# Patient Record
Sex: FEMALE | Race: BLACK OR AFRICAN AMERICAN | NOT HISPANIC OR LATINO
[De-identification: names, ages, dates, MRNs, and addresses within clinical notes are randomized per-mention and may not be internally consistent; named-entity substitution may affect disease eponyms.]

---

## 2018-03-18 PROBLEM — Z00.00 ENCOUNTER FOR PREVENTIVE HEALTH EXAMINATION: Status: ACTIVE | Noted: 2018-03-18

## 2018-04-17 ENCOUNTER — APPOINTMENT (OUTPATIENT)
Dept: GASTROENTEROLOGY | Facility: CLINIC | Age: 68
End: 2018-04-17

## 2019-08-09 ENCOUNTER — INPATIENT (INPATIENT)
Facility: HOSPITAL | Age: 69
LOS: 6 days | Discharge: NURSING, REHAB OR LONG-TERM CARE | End: 2019-08-16
Attending: STUDENT IN AN ORGANIZED HEALTH CARE EDUCATION/TRAINING PROGRAM | Admitting: STUDENT IN AN ORGANIZED HEALTH CARE EDUCATION/TRAINING PROGRAM
Payer: MEDICARE

## 2019-08-09 VITALS
HEART RATE: 89 BPM | TEMPERATURE: 98 F | RESPIRATION RATE: 16 BRPM | SYSTOLIC BLOOD PRESSURE: 174 MMHG | OXYGEN SATURATION: 100 % | DIASTOLIC BLOOD PRESSURE: 93 MMHG

## 2019-08-09 LAB
ALBUMIN SERPL ELPH-MCNC: 4.1 G/DL — SIGNIFICANT CHANGE UP (ref 3.3–5)
ALP SERPL-CCNC: 222 U/L — HIGH (ref 40–120)
ALT FLD-CCNC: 15 U/L — SIGNIFICANT CHANGE UP (ref 4–33)
ANION GAP SERPL CALC-SCNC: 13 MMO/L — SIGNIFICANT CHANGE UP (ref 7–14)
APTT BLD: 28.5 SEC — SIGNIFICANT CHANGE UP (ref 27.5–36.3)
AST SERPL-CCNC: 14 U/L — SIGNIFICANT CHANGE UP (ref 4–32)
BASE EXCESS BLDV CALC-SCNC: 6.1 MMOL/L — SIGNIFICANT CHANGE UP
BASOPHILS # BLD AUTO: 0.02 K/UL — SIGNIFICANT CHANGE UP (ref 0–0.2)
BASOPHILS NFR BLD AUTO: 0.2 % — SIGNIFICANT CHANGE UP (ref 0–2)
BILIRUB SERPL-MCNC: 0.3 MG/DL — SIGNIFICANT CHANGE UP (ref 0.2–1.2)
BLOOD GAS VENOUS - CREATININE: 0.64 MG/DL — SIGNIFICANT CHANGE UP (ref 0.5–1.3)
BUN SERPL-MCNC: 17 MG/DL — SIGNIFICANT CHANGE UP (ref 7–23)
CALCIUM SERPL-MCNC: 9.8 MG/DL — SIGNIFICANT CHANGE UP (ref 8.4–10.5)
CHLORIDE BLDV-SCNC: 104 MMOL/L — SIGNIFICANT CHANGE UP (ref 96–108)
CHLORIDE SERPL-SCNC: 100 MMOL/L — SIGNIFICANT CHANGE UP (ref 98–107)
CO2 SERPL-SCNC: 29 MMOL/L — SIGNIFICANT CHANGE UP (ref 22–31)
CREAT SERPL-MCNC: 0.59 MG/DL — SIGNIFICANT CHANGE UP (ref 0.5–1.3)
EOSINOPHIL # BLD AUTO: 0.01 K/UL — SIGNIFICANT CHANGE UP (ref 0–0.5)
EOSINOPHIL NFR BLD AUTO: 0.1 % — SIGNIFICANT CHANGE UP (ref 0–6)
GAS PNL BLDV: 139 MMOL/L — SIGNIFICANT CHANGE UP (ref 136–146)
GLUCOSE BLDV-MCNC: 173 MG/DL — HIGH (ref 70–99)
GLUCOSE SERPL-MCNC: 163 MG/DL — HIGH (ref 70–99)
HCO3 BLDV-SCNC: 29 MMOL/L — HIGH (ref 20–27)
HCT VFR BLD CALC: 32.5 % — LOW (ref 34.5–45)
HCT VFR BLDV CALC: 27.8 % — LOW (ref 34.5–45)
HGB BLD-MCNC: 9.5 G/DL — LOW (ref 11.5–15.5)
HGB BLDV-MCNC: 9 G/DL — LOW (ref 11.5–15.5)
IMM GRANULOCYTES NFR BLD AUTO: 0.4 % — SIGNIFICANT CHANGE UP (ref 0–1.5)
INR BLD: 1.04 — SIGNIFICANT CHANGE UP (ref 0.88–1.17)
LACTATE BLDV-MCNC: 1.6 MMOL/L — SIGNIFICANT CHANGE UP (ref 0.5–2)
LIDOCAIN IGE QN: 28.6 U/L — SIGNIFICANT CHANGE UP (ref 7–60)
LYMPHOCYTES # BLD AUTO: 0.91 K/UL — LOW (ref 1–3.3)
LYMPHOCYTES # BLD AUTO: 8.7 % — LOW (ref 13–44)
MCHC RBC-ENTMCNC: 23.9 PG — LOW (ref 27–34)
MCHC RBC-ENTMCNC: 29.2 % — LOW (ref 32–36)
MCV RBC AUTO: 81.9 FL — SIGNIFICANT CHANGE UP (ref 80–100)
MONOCYTES # BLD AUTO: 0.56 K/UL — SIGNIFICANT CHANGE UP (ref 0–0.9)
MONOCYTES NFR BLD AUTO: 5.4 % — SIGNIFICANT CHANGE UP (ref 2–14)
NEUTROPHILS # BLD AUTO: 8.9 K/UL — HIGH (ref 1.8–7.4)
NEUTROPHILS NFR BLD AUTO: 85.2 % — HIGH (ref 43–77)
NRBC # FLD: 0 K/UL — SIGNIFICANT CHANGE UP (ref 0–0)
PCO2 BLDV: 51 MMHG — SIGNIFICANT CHANGE UP (ref 41–51)
PH BLDV: 7.4 PH — SIGNIFICANT CHANGE UP (ref 7.32–7.43)
PLATELET # BLD AUTO: 339 K/UL — SIGNIFICANT CHANGE UP (ref 150–400)
PMV BLD: 10.2 FL — SIGNIFICANT CHANGE UP (ref 7–13)
PO2 BLDV: 31 MMHG — LOW (ref 35–40)
POTASSIUM BLDV-SCNC: 3.6 MMOL/L — SIGNIFICANT CHANGE UP (ref 3.4–4.5)
POTASSIUM SERPL-MCNC: 3.8 MMOL/L — SIGNIFICANT CHANGE UP (ref 3.5–5.3)
POTASSIUM SERPL-SCNC: 3.8 MMOL/L — SIGNIFICANT CHANGE UP (ref 3.5–5.3)
PROT SERPL-MCNC: 8.7 G/DL — HIGH (ref 6–8.3)
PROTHROM AB SERPL-ACNC: 11.9 SEC — SIGNIFICANT CHANGE UP (ref 9.8–13.1)
RBC # BLD: 3.97 M/UL — SIGNIFICANT CHANGE UP (ref 3.8–5.2)
RBC # FLD: 15.3 % — HIGH (ref 10.3–14.5)
SAO2 % BLDV: 49.5 % — LOW (ref 60–85)
SODIUM SERPL-SCNC: 142 MMOL/L — SIGNIFICANT CHANGE UP (ref 135–145)
TROPONIN T, HIGH SENSITIVITY: 15 NG/L — SIGNIFICANT CHANGE UP (ref ?–14)
WBC # BLD: 10.44 K/UL — SIGNIFICANT CHANGE UP (ref 3.8–10.5)
WBC # FLD AUTO: 10.44 K/UL — SIGNIFICANT CHANGE UP (ref 3.8–10.5)

## 2019-08-09 PROCEDURE — 76705 ECHO EXAM OF ABDOMEN: CPT | Mod: 26

## 2019-08-09 RX ORDER — SODIUM CHLORIDE 9 MG/ML
1000 INJECTION INTRAMUSCULAR; INTRAVENOUS; SUBCUTANEOUS ONCE
Refills: 0 | Status: COMPLETED | OUTPATIENT
Start: 2019-08-09 | End: 2019-08-09

## 2019-08-09 RX ORDER — ONDANSETRON 8 MG/1
4 TABLET, FILM COATED ORAL ONCE
Refills: 0 | Status: COMPLETED | OUTPATIENT
Start: 2019-08-09 | End: 2019-08-09

## 2019-08-09 RX ORDER — FAMOTIDINE 10 MG/ML
20 INJECTION INTRAVENOUS ONCE
Refills: 0 | Status: COMPLETED | OUTPATIENT
Start: 2019-08-09 | End: 2019-08-09

## 2019-08-09 RX ORDER — PIPERACILLIN AND TAZOBACTAM 4; .5 G/20ML; G/20ML
3.38 INJECTION, POWDER, LYOPHILIZED, FOR SOLUTION INTRAVENOUS ONCE
Refills: 0 | Status: COMPLETED | OUTPATIENT
Start: 2019-08-09 | End: 2019-08-09

## 2019-08-09 RX ADMIN — FAMOTIDINE 20 MILLIGRAM(S): 10 INJECTION INTRAVENOUS at 22:08

## 2019-08-09 RX ADMIN — SODIUM CHLORIDE 1000 MILLILITER(S): 9 INJECTION INTRAMUSCULAR; INTRAVENOUS; SUBCUTANEOUS at 23:10

## 2019-08-09 RX ADMIN — SODIUM CHLORIDE 1000 MILLILITER(S): 9 INJECTION INTRAMUSCULAR; INTRAVENOUS; SUBCUTANEOUS at 22:10

## 2019-08-09 RX ADMIN — ONDANSETRON 4 MILLIGRAM(S): 8 TABLET, FILM COATED ORAL at 22:05

## 2019-08-09 NOTE — ED PROVIDER NOTE - CARE PLAN
Principal Discharge DX:	Acute cholecystitis Principal Discharge DX:	Acute cholecystitis  Secondary Diagnosis:	Ventral hernia with bowel obstruction

## 2019-08-09 NOTE — ED PROVIDER NOTE - ATTENDING CONTRIBUTION TO CARE
Dr. Mann: I have personally performed a face to face bedside history and physical examination of this patient. I have discussed the history, examination, review of systems, assessment and plan of management with the resident. I have reviewed the electronic medical record and amended it to reflect my history, review of systems, physical exam, assessment and plan.     Attending Exam - Dr. Mann: GEN: well appearing, NAD  HEENT: +PERRL, EOMI  RESP: CTAB, no signs of respiratory distress CV: s1s2 RRR ABD: soft/non distended, diffusely tender  MSK: no deformities / swelling, normal range of motion, spine grossly normal NEURO: alert, non focal exam SKIN: normal color / temperature / condition.

## 2019-08-09 NOTE — ED PROVIDER NOTE - PROGRESS NOTE DETAILS
Marnie PGY3: [facility] of [facility] surgery consulted will see pt Marnie PGY3: pt evaluated by surgery will admit to their service for acute leo [place] PGY3: ventral wall hernia with sbo, mild mesenteric edema with fluid in hernia sac no pneumatosis or wall thickening. mass on US was L adnexal mass 19 cm surgery paged awaiting callback

## 2019-08-09 NOTE — ED ADULT TRIAGE NOTE - CHIEF COMPLAINT QUOTE
Pt c/o generalized abd pain/nv x 2 days, constipation x this am, pt states had same symptoms 2 months ago due to SBO.

## 2019-08-09 NOTE — ED PROVIDER NOTE - PMH
CAD (coronary artery disease)    DM (diabetes mellitus)    HTN (hypertension)    SBO (small bowel obstruction)

## 2019-08-09 NOTE — ED ADULT NURSE NOTE - NSIMPLEMENTINTERV_GEN_ALL_ED
Implemented All Fall with Harm Risk Interventions:  [place] to call system. Call bell, personal items and telephone within reach. Instruct patient to call for assistance. Room bathroom lighting operational. Non-slip footwear when patient is off stretcher. Physically safe environment: no spills, clutter or unnecessary equipment. Stretcher in lowest position, wheels locked, appropriate side rails in place. Provide visual cue, wrist band, yellow gown, etc. Monitor gait and stability. Monitor for mental status changes and reorient to person, place, and time. Review medications for side effects contributing to fall risk. Reinforce activity limits and safety measures with patient and family. Provide visual clues: red socks.

## 2019-08-09 NOTE — ED PROVIDER NOTE - CLINICAL SUMMARY MEDICAL DECISION MAKING FREE TEXT BOX
69 yo F c PMH of bowel obstruction (tx medically, this year) no PSH p/w 1 day h/o NBNB n/v associated with diffuse abdominal pain. On exam, T100.7F, VS otherwise wnl, +epigastric ttp, no rebound/guarding. SIN of GB surgery consulted will see pt. ctap pending. zosyn/ivf/tylenol for tx. will reassess.

## 2019-08-09 NOTE — ED ADULT NURSE NOTE - OBJECTIVE STATEMENT
Pt is a 69 y/o female that presents to the ED for eval of abd pain, n/v. Pt states that she has a history of bowel obstruction in March 2019, without any surgical interventions. Pt c/o multiple episodes of vomiting today, single, formed hard stool earlier today with c/o minimal flatus today. C/o upper abd pain with tenderness noted across upper abd and epigastric area. Pt c/o chills, denies subjective temps. Pt with limited mobility secondary to arthritis in various areas of the body. Stage II pressure ulcers x2 to sacrum and right buttock. Pt states that she has been being treated at home since discharge from outside facility in March. Ongoing eval in progress, will continue to monitor pt.

## 2019-08-09 NOTE — ED PROVIDER NOTE - OBJECTIVE STATEMENT
67 yo F c PMH of bowel obstruction (tx medically, this year) no PSH p/w 1 day h/o NBNB n/v associated with diffuse abdominal pain. last BM today was "hard" non-bloody.       home meds: metformin

## 2019-08-09 NOTE — ED PROVIDER NOTE - NONTENDER LOCATION
left lower quadrant/umbilical/left upper quadrant/right lower quadrant/right upper quadrant/periumbilical

## 2019-08-10 DIAGNOSIS — K81.0 ACUTE CHOLECYSTITIS: ICD-10-CM

## 2019-08-10 LAB
ANION GAP SERPL CALC-SCNC: 11 MMO/L — SIGNIFICANT CHANGE UP (ref 7–14)
BUN SERPL-MCNC: 15 MG/DL — SIGNIFICANT CHANGE UP (ref 7–23)
CALCIUM SERPL-MCNC: 9.4 MG/DL — SIGNIFICANT CHANGE UP (ref 8.4–10.5)
CHLORIDE SERPL-SCNC: 103 MMOL/L — SIGNIFICANT CHANGE UP (ref 98–107)
CO2 SERPL-SCNC: 29 MMOL/L — SIGNIFICANT CHANGE UP (ref 22–31)
CREAT SERPL-MCNC: 0.61 MG/DL — SIGNIFICANT CHANGE UP (ref 0.5–1.3)
GLUCOSE BLDC GLUCOMTR-MCNC: 104 MG/DL — HIGH (ref 70–99)
GLUCOSE BLDC GLUCOMTR-MCNC: 105 MG/DL — HIGH (ref 70–99)
GLUCOSE BLDC GLUCOMTR-MCNC: 111 MG/DL — HIGH (ref 70–99)
GLUCOSE BLDC GLUCOMTR-MCNC: 127 MG/DL — HIGH (ref 70–99)
GLUCOSE SERPL-MCNC: 149 MG/DL — HIGH (ref 70–99)
HCT VFR BLD CALC: 29.2 % — LOW (ref 34.5–45)
HGB BLD-MCNC: 8.4 G/DL — LOW (ref 11.5–15.5)
MAGNESIUM SERPL-MCNC: 1.9 MG/DL — SIGNIFICANT CHANGE UP (ref 1.6–2.6)
MCHC RBC-ENTMCNC: 23.5 PG — LOW (ref 27–34)
MCHC RBC-ENTMCNC: 28.8 % — LOW (ref 32–36)
MCV RBC AUTO: 81.6 FL — SIGNIFICANT CHANGE UP (ref 80–100)
NRBC # FLD: 0 K/UL — SIGNIFICANT CHANGE UP (ref 0–0)
PHOSPHATE SERPL-MCNC: 3 MG/DL — SIGNIFICANT CHANGE UP (ref 2.5–4.5)
PLATELET # BLD AUTO: 304 K/UL — SIGNIFICANT CHANGE UP (ref 150–400)
PMV BLD: 10.4 FL — SIGNIFICANT CHANGE UP (ref 7–13)
POTASSIUM SERPL-MCNC: 3.8 MMOL/L — SIGNIFICANT CHANGE UP (ref 3.5–5.3)
POTASSIUM SERPL-SCNC: 3.8 MMOL/L — SIGNIFICANT CHANGE UP (ref 3.5–5.3)
RBC # BLD: 3.58 M/UL — LOW (ref 3.8–5.2)
RBC # FLD: 15.2 % — HIGH (ref 10.3–14.5)
SODIUM SERPL-SCNC: 143 MMOL/L — SIGNIFICANT CHANGE UP (ref 135–145)
SPECIMEN SOURCE: SIGNIFICANT CHANGE UP
SPECIMEN SOURCE: SIGNIFICANT CHANGE UP
WBC # BLD: 10.51 K/UL — HIGH (ref 3.8–10.5)
WBC # FLD AUTO: 10.51 K/UL — HIGH (ref 3.8–10.5)

## 2019-08-10 PROCEDURE — 99222 1ST HOSP IP/OBS MODERATE 55: CPT | Mod: GC,57

## 2019-08-10 PROCEDURE — 71045 X-RAY EXAM CHEST 1 VIEW: CPT | Mod: 26

## 2019-08-10 PROCEDURE — 74177 CT ABD & PELVIS W/CONTRAST: CPT | Mod: 26

## 2019-08-10 RX ORDER — DEXTROSE 50 % IN WATER 50 %
12.5 SYRINGE (ML) INTRAVENOUS ONCE
Refills: 0 | Status: DISCONTINUED | OUTPATIENT
Start: 2019-08-10 | End: 2019-08-16

## 2019-08-10 RX ORDER — FUROSEMIDE 40 MG
1 TABLET ORAL
Qty: 0 | Refills: 0 | DISCHARGE

## 2019-08-10 RX ORDER — GLUCAGON INJECTION, SOLUTION 0.5 MG/.1ML
1 INJECTION, SOLUTION SUBCUTANEOUS ONCE
Refills: 0 | Status: DISCONTINUED | OUTPATIENT
Start: 2019-08-10 | End: 2019-08-16

## 2019-08-10 RX ORDER — DEXTROSE 50 % IN WATER 50 %
15 SYRINGE (ML) INTRAVENOUS ONCE
Refills: 0 | Status: DISCONTINUED | OUTPATIENT
Start: 2019-08-10 | End: 2019-08-16

## 2019-08-10 RX ORDER — DEXTROSE 50 % IN WATER 50 %
25 SYRINGE (ML) INTRAVENOUS ONCE
Refills: 0 | Status: DISCONTINUED | OUTPATIENT
Start: 2019-08-10 | End: 2019-08-16

## 2019-08-10 RX ORDER — SODIUM CHLORIDE 9 MG/ML
1000 INJECTION, SOLUTION INTRAVENOUS
Refills: 0 | Status: DISCONTINUED | OUTPATIENT
Start: 2019-08-10 | End: 2019-08-12

## 2019-08-10 RX ORDER — ATORVASTATIN CALCIUM 80 MG/1
1 TABLET, FILM COATED ORAL
Qty: 0 | Refills: 0 | DISCHARGE

## 2019-08-10 RX ORDER — INSULIN LISPRO 100/ML
VIAL (ML) SUBCUTANEOUS EVERY 6 HOURS
Refills: 0 | Status: DISCONTINUED | OUTPATIENT
Start: 2019-08-10 | End: 2019-08-11

## 2019-08-10 RX ORDER — METFORMIN HYDROCHLORIDE 850 MG/1
1 TABLET ORAL
Qty: 0 | Refills: 0 | DISCHARGE

## 2019-08-10 RX ORDER — ACETAMINOPHEN 500 MG
1000 TABLET ORAL ONCE
Refills: 0 | Status: COMPLETED | OUTPATIENT
Start: 2019-08-10 | End: 2019-08-10

## 2019-08-10 RX ORDER — ENOXAPARIN SODIUM 100 MG/ML
40 INJECTION SUBCUTANEOUS EVERY 12 HOURS
Refills: 0 | Status: DISCONTINUED | OUTPATIENT
Start: 2019-08-10 | End: 2019-08-16

## 2019-08-10 RX ADMIN — PIPERACILLIN AND TAZOBACTAM 200 GRAM(S): 4; .5 INJECTION, POWDER, LYOPHILIZED, FOR SOLUTION INTRAVENOUS at 00:02

## 2019-08-10 RX ADMIN — Medication 400 MILLIGRAM(S): at 23:02

## 2019-08-10 RX ADMIN — Medication 1000 MILLIGRAM(S): at 23:33

## 2019-08-10 RX ADMIN — SODIUM CHLORIDE 100 MILLILITER(S): 9 INJECTION, SOLUTION INTRAVENOUS at 19:19

## 2019-08-10 RX ADMIN — Medication 1.25 MILLIGRAM(S): at 19:21

## 2019-08-10 RX ADMIN — ENOXAPARIN SODIUM 40 MILLIGRAM(S): 100 INJECTION SUBCUTANEOUS at 19:21

## 2019-08-10 NOTE — CONSULT NOTE ADULT - SUBJECTIVE AND OBJECTIVE BOX
R2 Gyn Consult Note    CAS CASSIDY  68y  Female [id number]    HPI: 69y/o G0 with PMH of morbid obesity BMI>50, DM, HTN, HLD, OA and recent admission to [facility] for obstruction 2/2 umbilical hernia presented to the [facility] ED yesterday evening with abdominal pain, nausea, and vomiting. Patient found to have incarcerated ventral hernia with partial SBO on imaging. Admitted to Gen Surg. Imaging showed incidental 19cm left adnexal mass, Gyn team consulted. Patient seen and evaluated bedside. She reports 2 days of abdominal pain with nausea and non-bloody emesis yesterday. Unable to tolerate PO. Last BM and flatus on Thursday. Denies fevers and chills at home but patients reports being febrile in ED. Denies chest pain, shortness of breath, palpitations. Patient currently denies pelvic pain, post-menopausal vaginal bleeding, abnormal vaginal discharge. Patient reports remote of hx of small fibroids that never cause heavy bleeding or pelvic pain. Went through menopause around age 48. Denies hx of cysts. Reports she was not told she had an ovarian cyst during her recent [facility] admission despite undergoing imaging. Denies history of abnormal pap smears, last one 3 years ago, or STDs. Patient is not currently sexually active.     Name of last GYN Physician: Dr. Crowe (?) at the hip center (?) on [facility]    OBHx: G0    HCM: last pap 3 years ago, last mammo 1.5 years ago, never had a colonoscopy    PMH: DM2, HTN, HLD, OA, umbilical hernia    PSH: denies    Home Medications:  glipiZIDE 5 mg oral tablet: orally 2 times a day (10 Aug 2019 10:03)  Lasix 20 mg oral tablet: 1 tab(s) orally once a day (10 Aug 2019 10:03)  Lipitor 20 mg oral tablet: 1 tab(s) orally once a day (10 Aug 2019 10:03)  losartan 100 mg oral tablet: 1 tab(s) orally once a day (10 Aug 2019 10:03)  metFORMIN 500 mg oral tablet: 1 tab(s) orally 2 times a day (10 Aug 2019 10:03)    Allergies: NKDA    FamHx: grandma with breast CA, sister with breast CA diagnosed in her 20s, patient unsure if sister ever had genetic testing, denies additional family with breast, uterine, ovarian, colon cancers.    Soc: lives at home with , limited mobility 2/2 morbid obesity, uses wheelchair with walker intermittently has home health aid    Vital Signs Last 24 Hrs  T(C): 36.7 (10 Aug 2019 06:38), Max: 38.3 (09 Aug 2019 21:41)  T(F): 98.1 (10 Aug 2019 06:38), Max: 100.9 (09 Aug 2019 21:41)  HR: 66 (10 Aug 2019 06:38) (66 - 94)  BP: 146/58 (10 Aug 2019 06:38) (118/48 - 174/93)  BP(mean): --  RR: 18 (10 Aug 2019 06:38) (16 - 19)  SpO2: 100% (10 Aug 2019 06:38) (96% - 100%)    Physical Exam:   General: sitting comfortably in bed, NAD, AAox3, morbidly obese   CV: RRR  Lungs: CTAB  Abd: difficult exam given morbid obesity, soft, minimally-tender centrall, non-distended.  Bowel sounds present.    : deferred as unable to perform with single provider given morbid obesity  Ext: non-tender b/l, no edema     LABS:                              9.5    10.44 )-----------( 339      ( 09 Aug 2019 21:55 )             32.5     08-09    142  |  100  |  17  ----------------------------<  163<H>  3.8   |  29  |  0.59    Ca    9.8      09 Aug 2019 21:55    TPro  8.7<H>  /  Alb  4.1  /  TBili  0.3  /  DBili  x   /  AST  14  /  ALT  15  /  AlkPhos  222<H>  08-09    I&O's Detail    PT/INR - ( 09 Aug 2019 21:55 )   PT: 11.9 SEC;   INR: 1.04          PTT - ( 09 Aug 2019 21:55 )  PTT:28.5 SEC      RADIOLOGY & ADDITIONAL STUDIES:    < from: CT Abdomen and Pelvis w/ IV Cont (08.10.19 @ 01:58) >  EXAM:  CT ABDOMEN AND PELVIS IC        PROCEDURE DATE:  Aug 10 2019         INTERPRETATION:  CLINICAL INFORMATION: Vomiting. History of small bowel   obstruction in the past.     COMPARISON: Ultrasound 08/09/2019 PROCEDURE:   CT of the Abdomen and Pelvis was performed with intravenous contrast.   Intravenous contrast: 90 ml Omnipaque 350. 10 ml discarded.  Oral contrast: None.  Sagittal and coronal reformats were performed.    FINDINGS:    LOWER CHEST: Bibasilar subsegmental atelectasis.    LIVER: Within normal limits.  BILE DUCTS: Normal caliber.  GALLBLADDER: Cholelithiasis.  SPLEEN: Within normal limits.  PANCREAS: Within normal limits.  ADRENALS: A 3.4 cm indeterminate left adrenal 1.6 cm indeterminate right   adrenal nodules.  KIDNEYS/URETERS: Right interpolar subcentimeter hypodense focus, too   small to characterize. No hydronephrosis    BLADDER: Within normal limits.  REPRODUCTIVE ORGANS: Uterus  within normal limits. Prominent right adnexa   Large homogeneous fluid attenuation mass arising from the left adnexa   measuring 19.3 x 13.2 x 10.9 cm with calcifications.    BOWEL: Ventral hernia with dilated loops of small bowel with transition   point to the anterior abdomen suggesting low-grade/incomplete small bowel   obstruction. No bowel wall thickening or pneumatosis. Appendix is normal.  PERITONEUM: Mild mesenteric edema. Trace ascites.  VESSELS: Minimal atherosclerotic changes.  RETROPERITONEUM/LYMPH NODES: No lymphadenopathy.    ABDOMINAL WALL: Ventral hernia as above.  BONES: Degenerative changes.    IMPRESSION:     Small bowel obstruction with transition point to the anterior abdomen   within the ventral hernia.     No bowel wall thickening or pneumatosis.    A large left adnexal mass.    Bilateral indeterminateadrenal nodules.    < end of copied text > R2 Gyn Consult Note    CAS CASSIDY  68y  Female [id number]    HPI: 67y/o G0 with PMH of morbid obesity, DM, HTN, HLD, OA and recent admission to [facility] for obstruction 2/2 umbilical hernia presented to the [facility] ED yesterday evening with abdominal pain, nausea, and vomiting. Patient found to have incarcerated ventral hernia with partial SBO on imaging. Admitted to Gen Surg. Imaging showed incidental 19cm left adnexal mass, Gyn team consulted. Patient seen and evaluated bedside. She reports 2 days of abdominal pain with nausea and non-bloody emesis yesterday. Unable to tolerate PO. Last BM and flatus on Thursday. Denies fevers and chills at home but patients reports being febrile in ED. Denies chest pain, shortness of breath, palpitations. Patient currently denies pelvic pain, post-menopausal vaginal bleeding, abnormal vaginal discharge. Patient reports remote of hx of small fibroids that never cause heavy bleeding or pelvic pain. Went through menopause around age 48. Denies hx of cysts. Reports she was not told she had an ovarian cyst during her recent [facility] admission despite undergoing imaging. Denies history of abnormal pap smears, last one 3 years ago, or STDs. Patient is not currently sexually active.     Name of last GYN Physician: Dr. Crowe (?) at the hip center (?) on [facility]    OBHx: G0    HCM: last pap 3 years ago, last mammo 1.5 years ago, never had a colonoscopy    PMH: DM2, HTN, HLD, OA, umbilical hernia    PSH: denies    Home Medications:  glipiZIDE 5 mg oral tablet: orally 2 times a day (10 Aug 2019 10:03)  Lasix 20 mg oral tablet: 1 tab(s) orally once a day (10 Aug 2019 10:03)  Lipitor 20 mg oral tablet: 1 tab(s) orally once a day (10 Aug 2019 10:03)  losartan 100 mg oral tablet: 1 tab(s) orally once a day (10 Aug 2019 10:03)  metFORMIN 500 mg oral tablet: 1 tab(s) orally 2 times a day (10 Aug 2019 10:03)    Allergies: NKDA    FamHx: grandma with breast CA, sister with breast CA diagnosed in her 20s, patient unsure if sister ever had genetic testing, denies additional family with breast, uterine, ovarian, colon cancers.    Soc: lives at home with , limited mobility 2/2 morbid obesity, uses wheelchair with walker intermittently has home health aid    Vital Signs Last 24 Hrs  T(C): 36.7 (10 Aug 2019 06:38), Max: 38.3 (09 Aug 2019 21:41)  T(F): 98.1 (10 Aug 2019 06:38), Max: 100.9 (09 Aug 2019 21:41)  HR: 66 (10 Aug 2019 06:38) (66 - 94)  BP: 146/58 (10 Aug 2019 06:38) (118/48 - 174/93)  BP(mean): --  RR: 18 (10 Aug 2019 06:38) (16 - 19)  SpO2: 100% (10 Aug 2019 06:38) (96% - 100%)    Physical Exam:   General: sitting comfortably in bed, NAD, AAox3, morbidly obese   CV: RRR  Lungs: CTAB  Abd: difficult exam given morbid obesity, soft, minimally-tender centrall, non-distended.  Bowel sounds present.    : deferred as unable to perform with single provider given morbid obesity  Ext: non-tender b/l, no edema     LABS:                              9.5    10.44 )-----------( 339      ( 09 Aug 2019 21:55 )             32.5     08-09    142  |  100  |  17  ----------------------------<  163<H>  3.8   |  29  |  0.59    Ca    9.8      09 Aug 2019 21:55    TPro  8.7<H>  /  Alb  4.1  /  TBili  0.3  /  DBili  x   /  AST  14  /  ALT  15  /  AlkPhos  222<H>  08-09    I&O's Detail    PT/INR - ( 09 Aug 2019 21:55 )   PT: 11.9 SEC;   INR: 1.04          PTT - ( 09 Aug 2019 21:55 )  PTT:28.5 SEC      RADIOLOGY & ADDITIONAL STUDIES:    < from: CT Abdomen and Pelvis w/ IV Cont (08.10.19 @ 01:58) >  EXAM:  CT ABDOMEN AND PELVIS IC        PROCEDURE DATE:  Aug 10 2019         INTERPRETATION:  CLINICAL INFORMATION: Vomiting. History of small bowel   obstruction in the past.     COMPARISON: Ultrasound 08/09/2019 PROCEDURE:   CT of the Abdomen and Pelvis was performed with intravenous contrast.   Intravenous contrast: 90 ml Omnipaque 350. 10 ml discarded.  Oral contrast: None.  Sagittal and coronal reformats were performed.    FINDINGS:    LOWER CHEST: Bibasilar subsegmental atelectasis.    LIVER: Within normal limits.  BILE DUCTS: Normal caliber.  GALLBLADDER: Cholelithiasis.  SPLEEN: Within normal limits.  PANCREAS: Within normal limits.  ADRENALS: A 3.4 cm indeterminate left adrenal 1.6 cm indeterminate right   adrenal nodules.  KIDNEYS/URETERS: Right interpolar subcentimeter hypodense focus, too   small to characterize. No hydronephrosis    BLADDER: Within normal limits.  REPRODUCTIVE ORGANS: Uterus  within normal limits. Prominent right adnexa   Large homogeneous fluid attenuation mass arising from the left adnexa   measuring 19.3 x 13.2 x 10.9 cm with calcifications.    BOWEL: Ventral hernia with dilated loops of small bowel with transition   point to the anterior abdomen suggesting low-grade/incomplete small bowel   obstruction. No bowel wall thickening or pneumatosis. Appendix is normal.  PERITONEUM: Mild mesenteric edema. Trace ascites.  VESSELS: Minimal atherosclerotic changes.  RETROPERITONEUM/LYMPH NODES: No lymphadenopathy.    ABDOMINAL WALL: Ventral hernia as above.  BONES: Degenerative changes.    IMPRESSION:     Small bowel obstruction with transition point to the anterior abdomen   within the ventral hernia.     No bowel wall thickening or pneumatosis.    A large left adnexal mass.    Bilateral indeterminateadrenal nodules.    < end of copied text > R2 Gyn Consult Note    CAS CASSIDY  68y  Female [id number]    HPI: 69y/o G0 with PMH of morbid obesity, DM, HTN, HLD, OA and recent admission to [facility] for obstruction 2/2 umbilical hernia presented to the [facility] ED yesterday evening with abdominal pain, nausea, and vomiting. Patient found to have incarcerated ventral hernia with partial SBO on imaging. Admitted to Gen Surg. Imaging showed incidental 19cm left adnexal mass, Gyn team consulted. Patient seen and evaluated bedside. She reports 2 days of abdominal pain with nausea and non-bloody emesis yesterday. Unable to tolerate PO. Last BM and flatus on Thursday. Denies fevers and chills at home but patients reports being febrile in ED. Denies chest pain, shortness of breath, palpitations. Patient currently denies pelvic pain, post-menopausal vaginal bleeding, abnormal vaginal discharge. Patient reports remote of hx of small fibroids that never cause heavy bleeding or pelvic pain. Went through menopause around age 48. Denies hx of cysts. Reports she was not told she had an ovarian cyst during her recent [facility] admission despite undergoing imaging. Denies history of abnormal pap smears, last one 3 years ago, or STDs. Patient is not currently sexually active.     Name of last GYN Physician: Dr. Crowe (?) at the hip center (?) on [facility]    OBHx: G0    HCM: last pap 3 years ago, last mammo 1.5 years ago, never had a colonoscopy    PMH: DM2, HTN, HLD, OA, umbilical hernia    PSH: denies    Home Medications:  glipiZIDE 5 mg oral tablet: orally 2 times a day (10 Aug 2019 10:03)  Lasix 20 mg oral tablet: 1 tab(s) orally once a day (10 Aug 2019 10:03)  Lipitor 20 mg oral tablet: 1 tab(s) orally once a day (10 Aug 2019 10:03)  losartan 100 mg oral tablet: 1 tab(s) orally once a day (10 Aug 2019 10:03)  metFORMIN 500 mg oral tablet: 1 tab(s) orally 2 times a day (10 Aug 2019 10:03)    Allergies: NKDA    FamHx: grandma with breast CA, sister with breast CA diagnosed in her 20s, patient unsure if sister ever had genetic testing, denies additional family with breast, uterine, ovarian, colon cancers.    Soc: lives at home with , limited mobility 2/2 morbid obesity, uses wheelchair with walker intermittently has home health aid    Vital Signs Last 24 Hrs  T(C): 36.7 (10 Aug 2019 06:38), Max: 38.3 (09 Aug 2019 21:41)  T(F): 98.1 (10 Aug 2019 06:38), Max: 100.9 (09 Aug 2019 21:41)  HR: 66 (10 Aug 2019 06:38) (66 - 94)  BP: 146/58 (10 Aug 2019 06:38) (118/48 - 174/93)  BP(mean): --  RR: 18 (10 Aug 2019 06:38) (16 - 19)  SpO2: 100% (10 Aug 2019 06:38) (96% - 100%)    Physical Exam:   General: sitting comfortably in bed, NAD, AAox3, morbidly obese   CV: RRR  Lungs: CTAB  Abd: difficult exam given morbid obesity, soft, minimally-tender centrall, non-distended.  Bowel sounds present.    : limited pelvic exam given morbid obesity and narrow introitus: cervix small and closed, no masses palpated in vaginal vault  Ext: non-tender b/l, no edema     LABS:                              9.5    10.44 )-----------( 339      ( 09 Aug 2019 21:55 )             32.5     08-09    142  |  100  |  17  ----------------------------<  163<H>  3.8   |  29  |  0.59    Ca    9.8      09 Aug 2019 21:55    TPro  8.7<H>  /  Alb  4.1  /  TBili  0.3  /  DBili  x   /  AST  14  /  ALT  15  /  AlkPhos  222<H>  08-09    I&O's Detail    PT/INR - ( 09 Aug 2019 21:55 )   PT: 11.9 SEC;   INR: 1.04          PTT - ( 09 Aug 2019 21:55 )  PTT:28.5 SEC      RADIOLOGY & ADDITIONAL STUDIES:    < from: CT Abdomen and Pelvis w/ IV Cont (08.10.19 @ 01:58) >  EXAM:  CT ABDOMEN AND PELVIS IC        PROCEDURE DATE:  Aug 10 2019         INTERPRETATION:  CLINICAL INFORMATION: Vomiting. History of small bowel   obstruction in the past.     COMPARISON: Ultrasound 08/09/2019 PROCEDURE:   CT of the Abdomen and Pelvis was performed with intravenous contrast.   Intravenous contrast: 90 ml Omnipaque 350. 10 ml discarded.  Oral contrast: None.  Sagittal and coronal reformats were performed.    FINDINGS:    LOWER CHEST: Bibasilar subsegmental atelectasis.    LIVER: Within normal limits.  BILE DUCTS: Normal caliber.  GALLBLADDER: Cholelithiasis.  SPLEEN: Within normal limits.  PANCREAS: Within normal limits.  ADRENALS: A 3.4 cm indeterminate left adrenal 1.6 cm indeterminate right   adrenal nodules.  KIDNEYS/URETERS: Right interpolar subcentimeter hypodense focus, too   small to characterize. No hydronephrosis    BLADDER: Within normal limits.  REPRODUCTIVE ORGANS: Uterus  within normal limits. Prominent right adnexa   Large homogeneous fluid attenuation mass arising from the left adnexa   measuring 19.3 x 13.2 x 10.9 cm with calcifications.    BOWEL: Ventral hernia with dilated loops of small bowel with transition   point to the anterior abdomen suggesting low-grade/incomplete small bowel   obstruction. No bowel wall thickening or pneumatosis. Appendix is normal.  PERITONEUM: Mild mesenteric edema. Trace ascites.  VESSELS: Minimal atherosclerotic changes.  RETROPERITONEUM/LYMPH NODES: No lymphadenopathy.    ABDOMINAL WALL: Ventral hernia as above.  BONES: Degenerative changes.    IMPRESSION:     Small bowel obstruction with transition point to the anterior abdomen   within the ventral hernia.     No bowel wall thickening or pneumatosis.    A large left adnexal mass.    Bilateral indeterminateadrenal nodules.    < end of copied text >

## 2019-08-10 NOTE — H&P ADULT - NSICDXPASTMEDICALHX_GEN_ALL_CORE_FT
PAST MEDICAL HISTORY:  CAD (coronary artery disease)     DM (diabetes mellitus)     HTN (hypertension)     SBO (small bowel obstruction)

## 2019-08-10 NOTE — H&P ADULT - ASSESSMENT
68 year old female presenting with epigastric abdominal pain. Patient on CT scan demonstrated a ventral hernia with small bowel and fluid in hernia sac and a large ovarian cyst 10cm with calcification. Patient does not appear clinically to be obstructed despite the radiographic evidence. Patient is an extremely poor operative candidate given morbid obesity, this unknown adnexal mass with calcification, and overall state of health. Discussed in depth with patient that this hernia likely requires surgery however would request input from Gyn service regarding ovarian mass as it may change operative planning. We also planned to get records from [facility] where she reportedly had a stress test performed.    K43.6 Ventral hernia with bowel obstruction E13.9 DM (diabetes mellitus), secondary   I10 Essential hypertension   E66.01 Morbid (severe) obesity due to excess calories  plan  gyn consult  possible OR for VHR  serial abdominal exams  NPO, IVF  obtain records from [facility]  d/w  Dr. Duff

## 2019-08-10 NOTE — CONSULT NOTE ADULT - PROBLEM SELECTOR RECOMMENDATION 9
- recommend obtaining tumor markers (, CA 19-9, CEA, LDH, BHCG)  - obtain outside medical records    DRE Howe PGY2  d/w Dr. Singh

## 2019-08-10 NOTE — H&P ADULT - HISTORY OF PRESENT ILLNESS
68 year old female with PMH of morbidly obese BMI 50, DM, HTN, recent admission to [facility] for obstruction 2/2 to umbilical hernia presents with 2 days of abdominal pain in the epigastrum associated initially with vomiting and nausea. Patient reports last BM and flatus was yesterday. No fevers or chills. On exam patient appears comfortable however SOB, and abdominal exam tenderness at the umbilicus, with no skin changes or pain elsewhere. Labs showed left shift with Lizeth pct 80%, alk phos 222 otherwise normal. CT scan showed a transition point at the hernia site, with collapsed loops of bowel, and air throughout the colon indicating a partial SBO.

## 2019-08-10 NOTE — H&P ADULT - NSHPLABSRESULTS_GEN_ALL_CORE
reviewed CBC (08-09 @ 21:55)                          9.5<L>                   10.44   )--------------(  339        85.2<H>% Neuts, 8.7<L>% Lymphs, ANC: 8.90<H>                          32.5<L>    BMP (08-09 @ 21:55)       142     |  100     |  17    			Ca++ --      Ca 9.8          ---------------------------------( 163<H>		Mg --           3.8     |  29      |  0.59  			Ph --        LFTs (08-09 @ 21:55)      TPro 8.7<H> / Alb 4.1 / TBili 0.3 / DBili -- / AST 14 / ALT 15 / AlkPhos 222<H>    Coags (08-09 @ 21:55)  aPTT 28.5 / INR 1.04 / PT 11.9    VBG (08-09 @ 21:55)     7.40 / 51 / 31<L> / 29<H> / 6.1 / 49.5<L>%      Lactate: 1.6      EXAM:  CT ABDOMEN AND PELVIS IC    ******PRELIMINARY REPORT******        INTERPRETATION:  Ventralabdominal hernia with an associated small bowel   obstruction. There is a small amount of fluid within the hernia sac as   well as mild mesenteric edema. There is no bowel wall thickening or   pneumatosis. Nonspecific 19.3 x 13.2 x 10.9 cm left adnexal homogeneous   mass containing a calcification. Recommend MRI with and without IV   contrast for further characterization.

## 2019-08-10 NOTE — CONSULT NOTE ADULT - ASSESSMENT
69y/o G0 with PMH of morbid obesity BMI>50, DM, HTN, HLD, OA admitted presented with abdominal pain, nausea, vomiting, admitted to Gen Surg with partial SBO from ventral hernia. Gyn team consulted for incidentally found adnexal mass.  - recommend obtaining tumor markers (, CA 19-9, CEA, LDH, BHCG)  - obtain outside medical records    DRE Howe PGY2  TBS by attending 67y/o G0 with PMH of morbid obesity, DM, HTN, HLD, OA admitted presented with abdominal pain, nausea, vomiting, admitted to Gen Surg with partial SBO from ventral hernia. Gyn team consulted for incidentally found adnexal mass.  - recommend obtaining tumor markers (, CA 19-9, CEA, LDH, BHCG)  - obtain outside medical records    DRE Howe PGY2  TBS by attending 69y/o G0 with PMH of morbid obesity, DM, HTN, HLD, OA admitted presented with abdominal pain, nausea, vomiting, admitted to Gen Surg with partial SBO from ventral hernia. Gyn team consulted for incidentally found adnexal mass.

## 2019-08-10 NOTE — H&P ADULT - NSHPPHYSICALEXAM_GEN_ALL_CORE
as in HPI Vital Signs Last 24 Hrs  T(C): 36.7 (10 Aug 2019 06:38), Max: 38.3 (09 Aug 2019 21:41)  T(F): 98.1 (10 Aug 2019 06:38), Max: 100.9 (09 Aug 2019 21:41)  HR: 66 (10 Aug 2019 06:38) (66 - 94)  BP: 146/58 (10 Aug 2019 06:38) (118/48 - 174/93)  BP(mean): --  RR: 18 (10 Aug 2019 06:38) (16 - 19)  SpO2: 100% (10 Aug 2019 06:38) (96% - 100%)    PHYSICAL EXAM:  Constitutional: resting in bed with no acute distress  Respiratory:  unlabored breathing on room air   Gastrointestinal: large body habitus limiting exam, abdomen soft, non distended, tender in the umbilicus without skin changes   Extremities:  No edema, no calf tenderness

## 2019-08-11 DIAGNOSIS — N94.9 UNSPECIFIED CONDITION ASSOCIATED WITH FEMALE GENITAL ORGANS AND MENSTRUAL CYCLE: ICD-10-CM

## 2019-08-11 LAB
ANION GAP SERPL CALC-SCNC: 12 MMO/L — SIGNIFICANT CHANGE UP (ref 7–14)
APPEARANCE UR: SIGNIFICANT CHANGE UP
BACTERIA # UR AUTO: SIGNIFICANT CHANGE UP
BILIRUB UR-MCNC: NEGATIVE — SIGNIFICANT CHANGE UP
BLOOD UR QL VISUAL: SIGNIFICANT CHANGE UP
BUN SERPL-MCNC: 14 MG/DL — SIGNIFICANT CHANGE UP (ref 7–23)
CALCIUM SERPL-MCNC: 9.3 MG/DL — SIGNIFICANT CHANGE UP (ref 8.4–10.5)
CANCER AG125 SERPL-ACNC: 107 U/ML — HIGH
CANCER AG19-9 SERPL-ACNC: < 2 U/ML — SIGNIFICANT CHANGE UP
CEA SERPL-MCNC: 1.6 NG/ML — SIGNIFICANT CHANGE UP (ref 1–3.8)
CHLORIDE SERPL-SCNC: 105 MMOL/L — SIGNIFICANT CHANGE UP (ref 98–107)
CO2 SERPL-SCNC: 28 MMOL/L — SIGNIFICANT CHANGE UP (ref 22–31)
COLOR SPEC: YELLOW — SIGNIFICANT CHANGE UP
CREAT SERPL-MCNC: 0.54 MG/DL — SIGNIFICANT CHANGE UP (ref 0.5–1.3)
EPI CELLS # UR: SIGNIFICANT CHANGE UP
GLUCOSE BLDC GLUCOMTR-MCNC: 79 MG/DL — SIGNIFICANT CHANGE UP (ref 70–99)
GLUCOSE BLDC GLUCOMTR-MCNC: 82 MG/DL — SIGNIFICANT CHANGE UP (ref 70–99)
GLUCOSE BLDC GLUCOMTR-MCNC: 89 MG/DL — SIGNIFICANT CHANGE UP (ref 70–99)
GLUCOSE BLDC GLUCOMTR-MCNC: 89 MG/DL — SIGNIFICANT CHANGE UP (ref 70–99)
GLUCOSE BLDC GLUCOMTR-MCNC: 96 MG/DL — SIGNIFICANT CHANGE UP (ref 70–99)
GLUCOSE SERPL-MCNC: 83 MG/DL — SIGNIFICANT CHANGE UP (ref 70–99)
GLUCOSE UR-MCNC: NEGATIVE — SIGNIFICANT CHANGE UP
HBA1C BLD-MCNC: 5.2 % — SIGNIFICANT CHANGE UP (ref 4–5.6)
HCG SERPL-ACNC: < 5 MIU/ML — SIGNIFICANT CHANGE UP
HCV AB S/CO SERPL IA: 0.09 S/CO — SIGNIFICANT CHANGE UP (ref 0–0.99)
HCV AB SERPL-IMP: SIGNIFICANT CHANGE UP
KETONES UR-MCNC: NEGATIVE — SIGNIFICANT CHANGE UP
LDH SERPL L TO P-CCNC: 141 U/L — SIGNIFICANT CHANGE UP (ref 135–225)
LEUKOCYTE ESTERASE UR-ACNC: NEGATIVE — SIGNIFICANT CHANGE UP
MAGNESIUM SERPL-MCNC: 2 MG/DL — SIGNIFICANT CHANGE UP (ref 1.6–2.6)
NITRITE UR-MCNC: NEGATIVE — SIGNIFICANT CHANGE UP
PH UR: 6 — SIGNIFICANT CHANGE UP (ref 5–8)
PHOSPHATE SERPL-MCNC: 3 MG/DL — SIGNIFICANT CHANGE UP (ref 2.5–4.5)
POTASSIUM SERPL-MCNC: 3.8 MMOL/L — SIGNIFICANT CHANGE UP (ref 3.5–5.3)
POTASSIUM SERPL-SCNC: 3.8 MMOL/L — SIGNIFICANT CHANGE UP (ref 3.5–5.3)
PROT UR-MCNC: >300 — SIGNIFICANT CHANGE UP
RBC CASTS # UR COMP ASSIST: SIGNIFICANT CHANGE UP (ref 0–?)
SODIUM SERPL-SCNC: 145 MMOL/L — SIGNIFICANT CHANGE UP (ref 135–145)
SP GR SPEC: > 1.04 — HIGH (ref 1–1.04)
UROBILINOGEN FLD QL: NORMAL — SIGNIFICANT CHANGE UP
WBC UR QL: SIGNIFICANT CHANGE UP (ref 0–?)

## 2019-08-11 PROCEDURE — 99232 SBSQ HOSP IP/OBS MODERATE 35: CPT | Mod: GC

## 2019-08-11 RX ORDER — INSULIN LISPRO 100/ML
VIAL (ML) SUBCUTANEOUS
Refills: 0 | Status: DISCONTINUED | OUTPATIENT
Start: 2019-08-11 | End: 2019-08-16

## 2019-08-11 RX ORDER — ACETAMINOPHEN 500 MG
1000 TABLET ORAL ONCE
Refills: 0 | Status: COMPLETED | OUTPATIENT
Start: 2019-08-11 | End: 2019-08-11

## 2019-08-11 RX ORDER — NYSTATIN CREAM 100000 [USP'U]/G
1 CREAM TOPICAL
Refills: 0 | Status: COMPLETED | OUTPATIENT
Start: 2019-08-11 | End: 2019-08-14

## 2019-08-11 RX ORDER — POTASSIUM CHLORIDE 20 MEQ
20 PACKET (EA) ORAL ONCE
Refills: 0 | Status: COMPLETED | OUTPATIENT
Start: 2019-08-11 | End: 2019-08-11

## 2019-08-11 RX ADMIN — SODIUM CHLORIDE 100 MILLILITER(S): 9 INJECTION, SOLUTION INTRAVENOUS at 22:20

## 2019-08-11 RX ADMIN — NYSTATIN CREAM 1 APPLICATION(S): 100000 CREAM TOPICAL at 22:20

## 2019-08-11 RX ADMIN — SODIUM CHLORIDE 100 MILLILITER(S): 9 INJECTION, SOLUTION INTRAVENOUS at 06:40

## 2019-08-11 RX ADMIN — ENOXAPARIN SODIUM 40 MILLIGRAM(S): 100 INJECTION SUBCUTANEOUS at 18:36

## 2019-08-11 RX ADMIN — Medication 1.25 MILLIGRAM(S): at 18:36

## 2019-08-11 RX ADMIN — SODIUM CHLORIDE 100 MILLILITER(S): 9 INJECTION, SOLUTION INTRAVENOUS at 18:36

## 2019-08-11 RX ADMIN — Medication 1000 MILLIGRAM(S): at 08:36

## 2019-08-11 RX ADMIN — Medication 400 MILLIGRAM(S): at 08:06

## 2019-08-11 RX ADMIN — Medication 1.25 MILLIGRAM(S): at 06:37

## 2019-08-11 RX ADMIN — ENOXAPARIN SODIUM 40 MILLIGRAM(S): 100 INJECTION SUBCUTANEOUS at 06:38

## 2019-08-11 RX ADMIN — Medication 20 MILLIEQUIVALENT(S): at 11:45

## 2019-08-11 NOTE — PROGRESS NOTE ADULT - SUBJECTIVE AND OBJECTIVE BOX
GENERAL SURGERY DAILY PROGRESS NOTE:    Interval:  No acute events overnight.    Subjective:  Patient seen and examined. Reports pain is well controlled. Denies N/V. Tolerating diet. Passing flatus, +BM. Ambulating.    Vital Signs Last 24 Hrs  T(C): 36.3 (11 Aug 2019 06:18), Max: 36.8 (10 Aug 2019 17:03)  T(F): 97.3 (11 Aug 2019 06:18), Max: 98.3 (10 Aug 2019 22:26)  HR: 63 (11 Aug 2019 06:18) (62 - 75)  BP: 136/62 (11 Aug 2019 06:18) (106/56 - 136/62)  BP(mean): --  RR: 18 (11 Aug 2019 06:18) (16 - 18)  SpO2: 97% (11 Aug 2019 06:18) (97% - 100%)    Exam:  Gen: NAD, resting in bed, alert and responding appropriately  Resp: Airway patent, non-labored respirations  Abd: Soft, ND, NT, no rebound or guarding. Incisions c/d/i  Ext: No edema, WWP  Neuro: AAOx3, no focal deficits    I&O's Detail      Daily     Daily     MEDICATIONS  (STANDING):  dextrose 5%. 1000 milliLiter(s) (50 mL/Hr) IV Continuous <Continuous>  dextrose 50% Injectable 12.5 Gram(s) IV Push once  dextrose 50% Injectable 25 Gram(s) IV Push once  dextrose 50% Injectable 25 Gram(s) IV Push once  enalaprilat Injectable 1.25 milliGRAM(s) IV Push every 6 hours  enoxaparin Injectable 40 milliGRAM(s) SubCutaneous every 12 hours  insulin lispro (HumaLOG) corrective regimen sliding scale   SubCutaneous every 6 hours  lactated ringers. 1000 milliLiter(s) (100 mL/Hr) IV Continuous <Continuous>    MEDICATIONS  (PRN):  dextrose 40% Gel 15 Gram(s) Oral once PRN Blood Glucose LESS THAN 70 milliGRAM(s)/deciliter  glucagon  Injectable 1 milliGRAM(s) IntraMuscular once PRN Glucose LESS THAN 70 milligrams/deciliter      LABS:                        8.4    10.51 )-----------( 304      ( 10 Aug 2019 09:15 )             29.2     08-10    143  |  103  |  15  ----------------------------<  149<H>  3.8   |  29  |  0.61    Ca    9.4      10 Aug 2019 09:15  Phos  3.0     08-10  Mg     1.9     08-10    TPro  8.7<H>  /  Alb  4.1  /  TBili  0.3  /  DBili  x   /  AST  14  /  ALT  15  /  AlkPhos  222<H>  08-09    PT/INR - ( 09 Aug 2019 21:55 )   PT: 11.9 SEC;   INR: 1.04          PTT - ( 09 Aug 2019 21:55 )  PTT:28.5 SEC      Dispo:     WARD Juarez, PGY-2  Acute Care Surgery (B Team)  [id number] with any questions

## 2019-08-11 NOTE — CHART NOTE - NSCHARTNOTEFT_GEN_A_CORE
R2 GYN Chart Note     mildly elevated, additional tumor markers wnl. Cyst homogenous appearing with small calcifications more consistent with benign disease. Spoke with primary team, patient will likely be discharged and scheduled for hernia repair outpatient given need for medical clearance and optimization prior to OR. Will plan for concurrent cystectomy with GYN. Please have patient follow up outpatient in Dr. Singh's office as soon as possible after discharge    DRE Howe PGY2  GYN pager #[phone]

## 2019-08-11 NOTE — PROVIDER CONTACT NOTE (OTHER) - ASSESSMENT
Patient AxOx4, asymptomatic, asleep between care, denies chest pain, SOB, n/v/dizziness, no acute distress noted at this time

## 2019-08-11 NOTE — PROVIDER CONTACT NOTE (OTHER) - ASSESSMENT
patient AxOx4, denies chest pain, SOB, n/v/dizziness. pt complains of abdominal pain upon movement when changed.

## 2019-08-11 NOTE — PROGRESS NOTE ADULT - ATTENDING COMMENTS
I saw and examined the patient and agree with the above note.    Mrs. Leblanc has a complicated PMHx and was recently worked up for eventual hernia repair (stress test) at an OSH. Due to her morbid obesity and concurrent medical problems along with the incidental mass found on CT, we will not rush her to the OR for a ventral hernia repair. She is clinically doing well and appears to not have strangulated bowel. GYN recs appreciated. Will coordinate repair next week with input from our hernia surgeons as well as onc.    Clears, pain control  Obtain outside records     I spent 25min reviewing history, data, images and in discussion/coordination of care. Greater than 50% of my time was spent in face-to-face discussion with the patient regarding the care plan going forward.     Nikko Velasquez MD (Cell: [phone])  Acute and Critical Care Surgery    The Acute Care Surgery (B Team) Attending Group Practice:  Dr. Chelsea Delgadillo, Dr. Maycol Willams, Dr. Pennie Acevedo, Dr. Nikko Velasquez    Urgent issues - spectra [phone] or [phone]  Nonurgent issues - [phone]  Patient appointments or after hours - [phone]

## 2019-08-11 NOTE — PROVIDER CONTACT NOTE (OTHER) - ASSESSMENT
patient asleep between care, asymptomatic, no s/s of acute distress noted. denies chest pain, SOB, n/v/dizziness. patient due for enalaprilat IVP, asked provider if can be given, provider Anjelica Velarde states to hold until AM, continue to monitor

## 2019-08-11 NOTE — PROGRESS NOTE ADULT - ASSESSMENT
Assessment:  68F presenting with epigastric abdominal pain, with CT showing ventral hernia with small bowel and fluid in hernia sac and a large ovarian cyst 10cm with calcification.     Plan:  - f/u GYN  - Obtain medical records from [facility]  - NPO, IVF  - serial abdominal exam

## 2019-08-12 ENCOUNTER — TRANSCRIPTION ENCOUNTER (OUTPATIENT)
Age: 69
End: 2019-08-12

## 2019-08-12 LAB
ANION GAP SERPL CALC-SCNC: 8 MMO/L — SIGNIFICANT CHANGE UP (ref 7–14)
BUN SERPL-MCNC: 13 MG/DL — SIGNIFICANT CHANGE UP (ref 7–23)
CALCIUM SERPL-MCNC: 8.8 MG/DL — SIGNIFICANT CHANGE UP (ref 8.4–10.5)
CHLORIDE SERPL-SCNC: 104 MMOL/L — SIGNIFICANT CHANGE UP (ref 98–107)
CO2 SERPL-SCNC: 29 MMOL/L — SIGNIFICANT CHANGE UP (ref 22–31)
CREAT SERPL-MCNC: 0.6 MG/DL — SIGNIFICANT CHANGE UP (ref 0.5–1.3)
GLUCOSE BLDC GLUCOMTR-MCNC: 102 MG/DL — HIGH (ref 70–99)
GLUCOSE BLDC GLUCOMTR-MCNC: 121 MG/DL — HIGH (ref 70–99)
GLUCOSE BLDC GLUCOMTR-MCNC: 95 MG/DL — SIGNIFICANT CHANGE UP (ref 70–99)
GLUCOSE SERPL-MCNC: 82 MG/DL — SIGNIFICANT CHANGE UP (ref 70–99)
HCT VFR BLD CALC: 27.9 % — LOW (ref 34.5–45)
HGB BLD-MCNC: 8 G/DL — LOW (ref 11.5–15.5)
MAGNESIUM SERPL-MCNC: 1.8 MG/DL — SIGNIFICANT CHANGE UP (ref 1.6–2.6)
MCHC RBC-ENTMCNC: 23.8 PG — LOW (ref 27–34)
MCHC RBC-ENTMCNC: 28.7 % — LOW (ref 32–36)
MCV RBC AUTO: 83 FL — SIGNIFICANT CHANGE UP (ref 80–100)
NRBC # FLD: 0.02 K/UL — SIGNIFICANT CHANGE UP (ref 0–0)
PHOSPHATE SERPL-MCNC: 2.8 MG/DL — SIGNIFICANT CHANGE UP (ref 2.5–4.5)
PLATELET # BLD AUTO: 250 K/UL — SIGNIFICANT CHANGE UP (ref 150–400)
PMV BLD: 9.9 FL — SIGNIFICANT CHANGE UP (ref 7–13)
POTASSIUM SERPL-MCNC: 4.3 MMOL/L — SIGNIFICANT CHANGE UP (ref 3.5–5.3)
POTASSIUM SERPL-SCNC: 4.3 MMOL/L — SIGNIFICANT CHANGE UP (ref 3.5–5.3)
RBC # BLD: 3.36 M/UL — LOW (ref 3.8–5.2)
RBC # FLD: 15 % — HIGH (ref 10.3–14.5)
SODIUM SERPL-SCNC: 141 MMOL/L — SIGNIFICANT CHANGE UP (ref 135–145)
WBC # BLD: 7.64 K/UL — SIGNIFICANT CHANGE UP (ref 3.8–10.5)
WBC # FLD AUTO: 7.64 K/UL — SIGNIFICANT CHANGE UP (ref 3.8–10.5)

## 2019-08-12 RX ORDER — ACETAMINOPHEN 500 MG
975 TABLET ORAL ONCE
Refills: 0 | Status: COMPLETED | OUTPATIENT
Start: 2019-08-12 | End: 2019-08-12

## 2019-08-12 RX ORDER — FUROSEMIDE 40 MG
20 TABLET ORAL DAILY
Refills: 0 | Status: DISCONTINUED | OUTPATIENT
Start: 2019-08-12 | End: 2019-08-16

## 2019-08-12 RX ORDER — LOSARTAN POTASSIUM 100 MG/1
100 TABLET, FILM COATED ORAL DAILY
Refills: 0 | Status: DISCONTINUED | OUTPATIENT
Start: 2019-08-12 | End: 2019-08-16

## 2019-08-12 RX ORDER — ATORVASTATIN CALCIUM 80 MG/1
20 TABLET, FILM COATED ORAL AT BEDTIME
Refills: 0 | Status: DISCONTINUED | OUTPATIENT
Start: 2019-08-12 | End: 2019-08-16

## 2019-08-12 RX ADMIN — ATORVASTATIN CALCIUM 20 MILLIGRAM(S): 80 TABLET, FILM COATED ORAL at 21:10

## 2019-08-12 RX ADMIN — NYSTATIN CREAM 1 APPLICATION(S): 100000 CREAM TOPICAL at 06:15

## 2019-08-12 RX ADMIN — Medication 975 MILLIGRAM(S): at 22:00

## 2019-08-12 RX ADMIN — LOSARTAN POTASSIUM 100 MILLIGRAM(S): 100 TABLET, FILM COATED ORAL at 10:56

## 2019-08-12 RX ADMIN — ENOXAPARIN SODIUM 40 MILLIGRAM(S): 100 INJECTION SUBCUTANEOUS at 18:34

## 2019-08-12 RX ADMIN — ENOXAPARIN SODIUM 40 MILLIGRAM(S): 100 INJECTION SUBCUTANEOUS at 06:15

## 2019-08-12 RX ADMIN — Medication 975 MILLIGRAM(S): at 21:10

## 2019-08-12 RX ADMIN — Medication 1.25 MILLIGRAM(S): at 06:15

## 2019-08-12 RX ADMIN — NYSTATIN CREAM 1 APPLICATION(S): 100000 CREAM TOPICAL at 18:34

## 2019-08-12 RX ADMIN — Medication 1.25 MILLIGRAM(S): at 00:10

## 2019-08-12 RX ADMIN — Medication 20 MILLIGRAM(S): at 10:12

## 2019-08-12 NOTE — PHYSICAL THERAPY INITIAL EVALUATION ADULT - PERTINENT HX OF CURRENT PROBLEM, REHAB EVAL
Pt. admitted for incarcerated ventral hernia. PMH of morbid obesity, DM, HTN, recent admission to [facility] for obstruction secondary to umbilical hernia

## 2019-08-12 NOTE — PROGRESS NOTE ADULT - ATTENDING COMMENTS
Ms. Leblanc is a morbidly obese deconditioned female who presents with an chronically incarcerated ventral hernia. She has regained GI function after non-surgical treatment of her small bowel obstruction. From a surgical standpoint she is cleared for discharge. Will follow-up as an outpatient for elective repair.

## 2019-08-12 NOTE — PHYSICAL THERAPY INITIAL EVALUATION ADULT - GENERAL OBSERVATIONS, REHAB EVAL
Consult received, chart reviewed. Patient received supine in bed, NAD, +NC. Patient agreed to Evaluation from Physical Therapist.

## 2019-08-12 NOTE — PHYSICAL THERAPY INITIAL EVALUATION ADULT - ADDITIONAL COMMENTS
Pt. reports owning DME of rollator. Pt. reports she has had difficulty with functional mobility recently. Pt. ambulates short distances within the home.     Pt. was left supine in bed post PT Evaluation, NAD, all lines intact, call bell within reach.

## 2019-08-12 NOTE — DISCHARGE NOTE PROVIDER - CARE PROVIDERS DIRECT ADDRESSES
[email or web address]

## 2019-08-12 NOTE — DISCHARGE NOTE PROVIDER - CARE PROVIDER_API CALL
Mallorie Singh (DO)  Obstetrics and Gynecology  [address] [place] [address]  Phone: [phone]  Fax: [phone]  Follow Up Time:     Stephanie Hopson)  Surgery  [address] [place] [address]  Phone: [phone]  Fax: [phone]  Follow Up Time:

## 2019-08-12 NOTE — PROGRESS NOTE ADULT - SUBJECTIVE AND OBJECTIVE BOX
GENERAL SURGERY DAILY PROGRESS NOTE:    Interval:  No acute events overnight.    Subjective:  Patient seen and examined this AM. Reports pain remains well controlled. Denies N/V, SOB, CP. Tolerating diet. Passing flatus, +BM. Ambulating.    Vital Signs Last 24 Hrs  T(C): 36.3 (11 Aug 2019 06:18), Max: 36.8 (10 Aug 2019 17:03)  T(F): 97.3 (11 Aug 2019 06:18), Max: 98.3 (10 Aug 2019 22:26)  HR: 63 (11 Aug 2019 06:18) (62 - 75)  BP: 136/62 (11 Aug 2019 06:18) (106/56 - 136/62)  BP(mean): --  RR: 18 (11 Aug 2019 06:18) (16 - 18)  SpO2: 97% (11 Aug 2019 06:18) (97% - 100%)    Exam:  Gen: NAD, resting in bed, alert and responding appropriately  Resp: Airway patent, non-labored respirations  Abd: Soft, NDNT, +BS, no rebound or guarding. Incisions c/d/i  Ext: No edema, WWP  Neuro: AAOx3, no focal deficits    I&O's Detail      Daily     Daily     MEDICATIONS  (STANDING):  dextrose 5%. 1000 milliLiter(s) (50 mL/Hr) IV Continuous <Continuous>  dextrose 50% Injectable 12.5 Gram(s) IV Push once  dextrose 50% Injectable 25 Gram(s) IV Push once  dextrose 50% Injectable 25 Gram(s) IV Push once  enalaprilat Injectable 1.25 milliGRAM(s) IV Push every 6 hours  enoxaparin Injectable 40 milliGRAM(s) SubCutaneous every 12 hours  insulin lispro (HumaLOG) corrective regimen sliding scale   SubCutaneous every 6 hours  lactated ringers. 1000 milliLiter(s) (100 mL/Hr) IV Continuous <Continuous>    MEDICATIONS  (PRN):  dextrose 40% Gel 15 Gram(s) Oral once PRN Blood Glucose LESS THAN 70 milliGRAM(s)/deciliter  glucagon  Injectable 1 milliGRAM(s) IntraMuscular once PRN Glucose LESS THAN 70 milligrams/deciliter      LABS:                        8.4    10.51 )-----------( 304      ( 10 Aug 2019 09:15 )             29.2     08-10    143  |  103  |  15  ----------------------------<  149<H>  3.8   |  29  |  0.61    Ca    9.4      10 Aug 2019 09:15  Phos  3.0     08-10  Mg     1.9     08-10    TPro  8.7<H>  /  Alb  4.1  /  TBili  0.3  /  DBili  x   /  AST  14  /  ALT  15  /  AlkPhos  222<H>  08-09    PT/INR - ( 09 Aug 2019 21:55 )   PT: 11.9 SEC;   INR: 1.04          PTT - ( 09 Aug 2019 21:55 )  PTT:28.5 SEC

## 2019-08-12 NOTE — DISCHARGE NOTE PROVIDER - NSDCCPCAREPLAN_GEN_ALL_CORE_FT
PRINCIPAL DISCHARGE DIAGNOSIS  Diagnosis: Ventral hernia  Assessment and Plan of Treatment: ACTIVITY: No heavy lifting or straining. Otherwise, you may return to your usual level of physical activity.  DIET: Return to your usual diet.  NOTIFY YOUR SURGEON IF: You have any fever (over 100.4 F) or chills, persistent nausea/vomiting, persistent diarrhea, or if your pain is not controlled on your discharge pain medications.  FOLLOW-UP: Please follow up with your primary care physician in one week regarding your hospitalization.   You will need to have this repaired surgically.   Please call the surgeon's office number provided in 1- 2 weeks to beging planning for this surgery.      SECONDARY DISCHARGE DIAGNOSES  Diagnosis: Adnexal mass  Assessment and Plan of Treatment: Follow-up with gynecologist, Dr. Mallorie Singh- call number provided within 1-2 weeks for managment regarding this mass seen on CT scan.

## 2019-08-12 NOTE — DISCHARGE NOTE PROVIDER - HOSPITAL COURSE
68 year old female with PMH of morbidly obese BMI 50, DM, HTN, recent admission to [facility] for obstruction 2/2 to umbilical hernia presents to [facility] 8/10/19 with 2 days of abdominal pain in the epigastrum associated initially with vomiting and nausea. Patient reports last BM and flatus was yesterday. No fevers or chills. On exam patient appears comfortable however SOB, and abdominal exam tenderness at the umbilicus, with no skin changes or pain elsewhere. Labs showed left shift with Lizeth pct 80%, alk phos 222 otherwise normal.         Admission CT (8/10): Small bowel obstruction with transition point to the anterior abdomen     within the ventral hernia.  No bowel wall thickening or pneumatosis. A large left adnexal mass.    Bilateral indeterminate adrenal nodules.        Patient was admitted to the surgical service. Due to her morbid obesity and concurrent medical problems along with the incidental mass found on CT, it was decided not to emergently take patient the OR for a ventral hernia repair. She is clinically doing well and appears to not have strangulated bowel.         Gynecology was consulted given CT findings.  was found to be mildly elevated, additional tumor markers wnl. Cyst homogenous appearing with small calcifications more consistent with benign disease. Patient was recommended to follow up outpatient in Dr. Singh's office as soon as possible after discharge for planned concurrent cystectomy.         Patient was assessed by physical therapy and deemed stable for discharge______________        Pt currently voiding, tolerating a regular diet, without pain. Patient is stable for discharge home to follow up as an outpatient, instructed to call to schedule appointment.

## 2019-08-12 NOTE — PHYSICAL THERAPY INITIAL EVALUATION ADULT - CRITERIA FOR SKILLED THERAPEUTIC INTERVENTIONS
rehab potential/predicted duration of therapy intervention/risk reduction/prevention/impairments found/anticipated discharge recommendation/therapy frequency

## 2019-08-12 NOTE — PROGRESS NOTE ADULT - ASSESSMENT
Assessment:  Pt is a 68F presenting with epigastric abdominal pain, with CT showing ventral hernia with small bowel and fluid in hernia sac and a large ovarian cyst 10cm with calcification.     Plan:  - f/u GYN recs  - Obtain medical records from [facility]  - Diet: regular  - Fluids: HLIV  - Transition to PO medications and restart home meds  - Continue to monitor GI function    B Team Surgery  [id number]

## 2019-08-13 LAB
GLUCOSE BLDC GLUCOMTR-MCNC: 114 MG/DL — HIGH (ref 70–99)
GLUCOSE BLDC GLUCOMTR-MCNC: 132 MG/DL — HIGH (ref 70–99)
GLUCOSE BLDC GLUCOMTR-MCNC: 153 MG/DL — HIGH (ref 70–99)
GLUCOSE BLDC GLUCOMTR-MCNC: 155 MG/DL — HIGH (ref 70–99)

## 2019-08-13 RX ORDER — POLYETHYLENE GLYCOL 3350 17 G/17G
17 POWDER, FOR SOLUTION ORAL DAILY
Refills: 0 | Status: DISCONTINUED | OUTPATIENT
Start: 2019-08-13 | End: 2019-08-16

## 2019-08-13 RX ORDER — ACETAMINOPHEN 500 MG
975 TABLET ORAL EVERY 6 HOURS
Refills: 0 | Status: DISCONTINUED | OUTPATIENT
Start: 2019-08-13 | End: 2019-08-16

## 2019-08-13 RX ADMIN — Medication 20 MILLIGRAM(S): at 06:01

## 2019-08-13 RX ADMIN — ATORVASTATIN CALCIUM 20 MILLIGRAM(S): 80 TABLET, FILM COATED ORAL at 23:29

## 2019-08-13 RX ADMIN — NYSTATIN CREAM 1 APPLICATION(S): 100000 CREAM TOPICAL at 06:01

## 2019-08-13 RX ADMIN — Medication 975 MILLIGRAM(S): at 19:28

## 2019-08-13 RX ADMIN — Medication 975 MILLIGRAM(S): at 09:54

## 2019-08-13 RX ADMIN — Medication 975 MILLIGRAM(S): at 09:28

## 2019-08-13 RX ADMIN — LOSARTAN POTASSIUM 100 MILLIGRAM(S): 100 TABLET, FILM COATED ORAL at 06:01

## 2019-08-13 RX ADMIN — Medication 1: at 13:11

## 2019-08-13 RX ADMIN — Medication 975 MILLIGRAM(S): at 19:52

## 2019-08-13 RX ADMIN — ENOXAPARIN SODIUM 40 MILLIGRAM(S): 100 INJECTION SUBCUTANEOUS at 06:01

## 2019-08-13 RX ADMIN — NYSTATIN CREAM 1 APPLICATION(S): 100000 CREAM TOPICAL at 19:19

## 2019-08-13 NOTE — PROGRESS NOTE ADULT - ASSESSMENT
Assessment:  Pt is a 68F presenting with epigastric abdominal pain, with CT showing ventral hernia with small bowel and fluid in hernia sac and a large ovarian cyst 10cm with calcification. Doing well with return of bowel function, ready for d/c to rehab facility per PT recs.    Plan:  - GYN aware, will coordinate outpatient to schedule surgery  - Obtain medical records from [facility]  - Diet: regular  - Transition to PO medications and restart home meds  - Continue to monitor GI function  - Arthiritic pain in ankles/back, on standing Tylenol PRN.  - Seen by PT who recommended rehab facility, case management aware and patient amenable    B Team Surgery  [id number]

## 2019-08-13 NOTE — PROGRESS NOTE ADULT - SUBJECTIVE AND OBJECTIVE BOX
Interval Events:    No acute events overnight    S: Patient doing well, denies fevers, chills, nausea, emesis, chest pain, SOB.  Pain is well controlled. Tolerating PO w/o N/V.  +/+ F/BM.    O: Vital Signs Last 24 Hrs  T(C): 36.9 (12 Aug 2019 21:08), Max: 36.9 (12 Aug 2019 17:41)  T(F): 98.5 (12 Aug 2019 21:08), Max: 98.5 (12 Aug 2019 21:08)  HR: 82 (12 Aug 2019 21:08) (65 - 82)  BP: 169/68 (12 Aug 2019 21:08) (133/46 - 169/68)  BP(mean): --  RR: 16 (12 Aug 2019 21:08) (16 - 18)  SpO2: 97% (12 Aug 2019 21:08) (97% - 100%)      11 Aug 2019 07:01  -  12 Aug 2019 07:00  --------------------------------------------------------  IN:    lactated ringers.: 300 mL    Oral Fluid: 400 mL  Total IN: 700 mL    OUT:    Voided: 600 mL  Total OUT: 600 mL    Total NET: 100 mL      12 Aug 2019 07:01  -  13 Aug 2019 00:07  --------------------------------------------------------  IN:    Oral Fluid: 400 mL  Total IN: 400 mL    OUT:    Voided: 1500 mL  Total OUT: 1500 mL    Total NET: -1100 mL          Physical Exam:    Gen: Well-developed, well-nourished in no acute distress  Resp: Clear to auscultation bilaterally with no wheezes, rale, or rhonchi  CV: Regular rate and rhythm with no murmur, gallop, or rub  GI: Soft, non-tender, non-distended with normoactive bowel sounds.  No masses.  MSK: Moves all extremities equally  Skin: No rashes    Labs:                        8.0    7.64  )-----------( 250      ( 12 Aug 2019 06:20 )             27.9     12 Aug 2019 06:20    141    |  104    |  13     ----------------------------<  82     4.3     |  29     |  0.60     Ca    8.8        12 Aug 2019 06:20  Phos  2.8       12 Aug 2019 06:20  Mg     1.8       12 Aug 2019 06:20        CAPILLARY BLOOD GLUCOSE      POCT Blood Glucose.: 121 mg/dL (12 Aug 2019 17:56)  POCT Blood Glucose.: 102 mg/dL (12 Aug 2019 12:42)  POCT Blood Glucose.: 95 mg/dL (12 Aug 2019 08:45)            Urinalysis Basic - ( 11 Aug 2019 08:00 )    Color: YELLOW / Appearance: TURBID / SG: > 1.040 / pH: 6.0  Gluc: NEGATIVE / Ketone: NEGATIVE  / Bili: NEGATIVE / Urobili: NORMAL   Blood: MODERATE / Protein: >300 / Nitrite: NEGATIVE   Leuk Esterase: NEGATIVE / RBC: 5-10 / WBC 0-2   Sq Epi: x / Non Sq Epi: FEW / Bacteria: SMALL        MEDICATIONS  (STANDING):  atorvastatin 20 milliGRAM(s) Oral at bedtime  dextrose 50% Injectable 12.5 Gram(s) IV Push once  dextrose 50% Injectable 25 Gram(s) IV Push once  dextrose 50% Injectable 25 Gram(s) IV Push once  enoxaparin Injectable 40 milliGRAM(s) SubCutaneous every 12 hours  furosemide    Tablet 20 milliGRAM(s) Oral daily  insulin lispro (HumaLOG) corrective regimen sliding scale   SubCutaneous three times a day before meals  losartan 100 milliGRAM(s) Oral daily  nystatin Powder 1 Application(s) Topical two times a day    MEDICATIONS  (PRN):  acetaminophen   Tablet .. 975 milliGRAM(s) Oral every 6 hours PRN Mild Pain (1 - 3), Moderate Pain (4 - 6), Severe Pain (7 - 10)  dextrose 40% Gel 15 Gram(s) Oral once PRN Blood Glucose LESS THAN 70 milliGRAM(s)/deciliter  glucagon  Injectable 1 milliGRAM(s) IntraMuscular once PRN Glucose LESS THAN 70 milligrams/deciliter

## 2019-08-14 LAB
BACTERIA BLD CULT: SIGNIFICANT CHANGE UP
BACTERIA BLD CULT: SIGNIFICANT CHANGE UP
GLUCOSE BLDC GLUCOMTR-MCNC: 135 MG/DL — HIGH (ref 70–99)
GLUCOSE BLDC GLUCOMTR-MCNC: 137 MG/DL — HIGH (ref 70–99)
GLUCOSE BLDC GLUCOMTR-MCNC: 141 MG/DL — HIGH (ref 70–99)
GLUCOSE BLDC GLUCOMTR-MCNC: 157 MG/DL — HIGH (ref 70–99)

## 2019-08-14 RX ADMIN — NYSTATIN CREAM 1 APPLICATION(S): 100000 CREAM TOPICAL at 06:14

## 2019-08-14 RX ADMIN — ENOXAPARIN SODIUM 40 MILLIGRAM(S): 100 INJECTION SUBCUTANEOUS at 06:14

## 2019-08-14 RX ADMIN — Medication 975 MILLIGRAM(S): at 15:30

## 2019-08-14 RX ADMIN — Medication 1: at 18:09

## 2019-08-14 RX ADMIN — ATORVASTATIN CALCIUM 20 MILLIGRAM(S): 80 TABLET, FILM COATED ORAL at 23:28

## 2019-08-14 RX ADMIN — ENOXAPARIN SODIUM 40 MILLIGRAM(S): 100 INJECTION SUBCUTANEOUS at 18:10

## 2019-08-14 RX ADMIN — Medication 975 MILLIGRAM(S): at 06:13

## 2019-08-14 RX ADMIN — LOSARTAN POTASSIUM 100 MILLIGRAM(S): 100 TABLET, FILM COATED ORAL at 06:14

## 2019-08-14 RX ADMIN — Medication 975 MILLIGRAM(S): at 07:13

## 2019-08-14 RX ADMIN — Medication 20 MILLIGRAM(S): at 06:13

## 2019-08-14 RX ADMIN — Medication 975 MILLIGRAM(S): at 16:00

## 2019-08-14 NOTE — PROGRESS NOTE ADULT - ASSESSMENT
Assessment:  Pt is a 68F presenting with epigastric abdominal pain, with CT showing ventral hernia with small bowel and fluid in hernia sac and a large ovarian cyst 10cm with calcification. Doing well with return of bowel function, ready for d/c to rehab facility per PT recs.    Plan:  - GYN aware, will coordinate outpatient to schedule surgery  - Diet: regular  - Transition to PO medications and restart home meds  - Continue to monitor GI function  - Arthiritic pain in ankles/back, on standing Tylenol PRN.  - Seen by PT who recommended rehab facility, case management aware and patient amenable    B Team Surgery  [id number]

## 2019-08-15 LAB
GLUCOSE BLDC GLUCOMTR-MCNC: 129 MG/DL — HIGH (ref 70–99)
GLUCOSE BLDC GLUCOMTR-MCNC: 131 MG/DL — HIGH (ref 70–99)
GLUCOSE BLDC GLUCOMTR-MCNC: 135 MG/DL — HIGH (ref 70–99)
GLUCOSE BLDC GLUCOMTR-MCNC: 137 MG/DL — HIGH (ref 70–99)

## 2019-08-15 RX ADMIN — ATORVASTATIN CALCIUM 20 MILLIGRAM(S): 80 TABLET, FILM COATED ORAL at 22:06

## 2019-08-15 RX ADMIN — LOSARTAN POTASSIUM 100 MILLIGRAM(S): 100 TABLET, FILM COATED ORAL at 05:57

## 2019-08-15 RX ADMIN — Medication 975 MILLIGRAM(S): at 13:08

## 2019-08-15 RX ADMIN — Medication 20 MILLIGRAM(S): at 05:57

## 2019-08-15 RX ADMIN — ENOXAPARIN SODIUM 40 MILLIGRAM(S): 100 INJECTION SUBCUTANEOUS at 05:57

## 2019-08-15 RX ADMIN — ENOXAPARIN SODIUM 40 MILLIGRAM(S): 100 INJECTION SUBCUTANEOUS at 18:51

## 2019-08-15 RX ADMIN — Medication 975 MILLIGRAM(S): at 14:00

## 2019-08-15 NOTE — PROGRESS NOTE ADULT - SUBJECTIVE AND OBJECTIVE BOX
Interval Events:    No acute events overnight    S: Patient doing well, denies fevers, chills, nausea, emesis, chest pain, SOB.  Pain is well controlled. Tolerating PO w/o N/V.  +F/+BM.    O: Vital Signs Last 24 Hrs  T(C): 36.4 (14 Aug 2019 05:57), Max: 37 (13 Aug 2019 14:26)  T(F): 97.5 (14 Aug 2019 05:57), Max: 98.6 (13 Aug 2019 14:26)  HR: 87 (14 Aug 2019 05:57) (69 - 87)  BP: 155/64 (14 Aug 2019 05:57) (138/54 - 155/64)  BP(mean): --  RR: 16 (14 Aug 2019 05:57) (16 - 18)  SpO2: 97% (14 Aug 2019 05:57) (97% - 99%)      13 Aug 2019 07:01  -  14 Aug 2019 07:00  --------------------------------------------------------  IN:    Oral Fluid: 400 mL  Total IN: 400 mL    OUT:    Voided: 1100 mL  Total OUT: 1100 mL    Total NET: -700 mL          Physical Exam:    Gen: Well-developed, well-nourished in no acute distress  Resp: Clear to auscultation bilaterally with no wheezes, rale, or rhonchi  CV: Regular rate and rhythm with no murmur, gallop, or rub  GI: Soft, non-tender, distended with normoactive bowel sounds.  No masses.  MSK: Moves all extremities equally  Skin: No rashes    Labs:    CAPILLARY BLOOD GLUCOSE      POCT Blood Glucose.: 155 mg/dL (13 Aug 2019 21:26)  POCT Blood Glucose.: 132 mg/dL (13 Aug 2019 17:57)  POCT Blood Glucose.: 153 mg/dL (13 Aug 2019 13:04)  POCT Blood Glucose.: 114 mg/dL (13 Aug 2019 08:52)    MEDICATIONS  (STANDING):  atorvastatin 20 milliGRAM(s) Oral at bedtime  dextrose 50% Injectable 12.5 Gram(s) IV Push once  dextrose 50% Injectable 25 Gram(s) IV Push once  dextrose 50% Injectable 25 Gram(s) IV Push once  enoxaparin Injectable 40 milliGRAM(s) SubCutaneous every 12 hours  furosemide    Tablet 20 milliGRAM(s) Oral daily  insulin lispro (HumaLOG) corrective regimen sliding scale   SubCutaneous three times a day before meals  losartan 100 milliGRAM(s) Oral daily    MEDICATIONS  (PRN):  acetaminophen   Tablet .. 975 milliGRAM(s) Oral every 6 hours PRN Mild Pain (1 - 3), Moderate Pain (4 - 6), Severe Pain (7 - 10)  dextrose 40% Gel 15 Gram(s) Oral once PRN Blood Glucose LESS THAN 70 milliGRAM(s)/deciliter  glucagon  Injectable 1 milliGRAM(s) IntraMuscular once PRN Glucose LESS THAN 70 milligrams/deciliter  polyethylene glycol 3350 17 Gram(s) Oral daily PRN Constipation

## 2019-08-15 NOTE — PROGRESS NOTE ADULT - ASSESSMENT
Assessment:  Pt is a 68F presenting with epigastric abdominal pain, with CT showing ventral hernia with small bowel and fluid in hernia sac and a large ovarian cyst 10cm with calcification. Doing well with return of bowel function, ready for d/c to rehab facility per PT recs.    Plan:  - GYN aware, will coordinate outpatient to schedule surgery  - Diet: regular  - Transition to PO medications and restart home meds  - Continue to monitor GI function  - Arthiritic pain in ankles/back, on standing Tylenol PRN.  - Seen by PT who recommended rehab facility, case management aware and patient amenable    B Team Surgery  [id number]

## 2019-08-16 ENCOUNTER — TRANSCRIPTION ENCOUNTER (OUTPATIENT)
Age: 69
End: 2019-08-16

## 2019-08-16 VITALS
HEART RATE: 86 BPM | RESPIRATION RATE: 20 BRPM | SYSTOLIC BLOOD PRESSURE: 150 MMHG | TEMPERATURE: 99 F | OXYGEN SATURATION: 97 % | DIASTOLIC BLOOD PRESSURE: 71 MMHG

## 2019-08-16 LAB
GLUCOSE BLDC GLUCOMTR-MCNC: 121 MG/DL — HIGH (ref 70–99)
GLUCOSE BLDC GLUCOMTR-MCNC: 124 MG/DL — HIGH (ref 70–99)
GLUCOSE BLDC GLUCOMTR-MCNC: 133 MG/DL — HIGH (ref 70–99)

## 2019-08-16 RX ADMIN — Medication 975 MILLIGRAM(S): at 12:00

## 2019-08-16 RX ADMIN — Medication 20 MILLIGRAM(S): at 06:06

## 2019-08-16 RX ADMIN — Medication 975 MILLIGRAM(S): at 11:10

## 2019-08-16 RX ADMIN — LOSARTAN POTASSIUM 100 MILLIGRAM(S): 100 TABLET, FILM COATED ORAL at 06:06

## 2019-08-16 RX ADMIN — ENOXAPARIN SODIUM 40 MILLIGRAM(S): 100 INJECTION SUBCUTANEOUS at 06:06

## 2019-08-16 NOTE — DISCHARGE NOTE NURSING/CASE MANAGEMENT/SOCIAL WORK - NSDCDPATPORTLINK_GEN_ALL_CORE
You can access the [facility] Patient Portal, offered by [facility], by registering with the following website: [email or web address]

## 2019-08-16 NOTE — PROGRESS NOTE ADULT - ASSESSMENT
Assessment:  Pt is a 68F presenting with epigastric abdominal pain, with CT showing ventral hernia with small bowel and fluid in hernia sac and a large ovarian cyst 10cm with calcification. Doing well with return of bowel function, ready for d/c to rehab facility per PT recs.    Plan:  - GYN aware, will coordinate outpatient to schedule surgery  - Diet: regular  - Transition to PO medications and restart home meds  - Continue to monitor GI function  - Arthiritic pain in ankles/back, on standing Tylenol PRN.  - Seen by PT who recommended rehab facility, case management aware and patient amenable    Dispo pending authorization    B Team Surgery  [id number]

## 2019-08-16 NOTE — DISCHARGE NOTE NURSING/CASE MANAGEMENT/SOCIAL WORK - NSDCCRNAME_GEN_ALL_CORE_FT
[facility] [phone] [facility] [address]  [facility] [phone]

## 2019-08-16 NOTE — PROGRESS NOTE ADULT - SUBJECTIVE AND OBJECTIVE BOX
Interval Events:    No acute events overnight    S: Patient doing well, denies fevers, chills, nausea, emesis, chest pain, SOB.  Pain is well controlled. Tolerating PO w/o N/V.  +/+ F/BM.    O: Vital Signs Last 24 Hrs  T(C): 36.8 (16 Aug 2019 06:05), Max: 36.8 (15 Aug 2019 22:15)  T(F): 98.2 (16 Aug 2019 06:05), Max: 98.3 (15 Aug 2019 22:15)  HR: 87 (16 Aug 2019 06:05) (82 - 89)  BP: 149/71 (16 Aug 2019 06:05) (120/55 - 165/69)  BP(mean): --  RR: 18 (16 Aug 2019 06:05) (17 - 20)  SpO2: 94% (16 Aug 2019 06:05) (92% - 99%)      15 Aug 2019 07:01  -  16 Aug 2019 07:00  --------------------------------------------------------  IN:  Total IN: 0 mL    OUT:    Voided: 2500 mL  Total OUT: 2500 mL    Total NET: -2500 mL          Physical Exam:    Gen: Well-developed, well-nourished in no acute distress  Resp: Clear to auscultation bilaterally with no wheezes, rale, or rhonchi  CV: Regular rate and rhythm with no murmur, gallop, or rub  GI: Soft, non-tender, non-distended with normoactive bowel sounds.  No masses.  MSK: Moves all extremities equally  Skin: No rashes    Labs:      CAPILLARY BLOOD GLUCOSE      POCT Blood Glucose.: 124 mg/dL (16 Aug 2019 09:09)  POCT Blood Glucose.: 131 mg/dL (15 Aug 2019 21:56)  POCT Blood Glucose.: 137 mg/dL (15 Aug 2019 17:50)  POCT Blood Glucose.: 135 mg/dL (15 Aug 2019 13:02)    MEDICATIONS  (STANDING):  atorvastatin 20 milliGRAM(s) Oral at bedtime  dextrose 50% Injectable 12.5 Gram(s) IV Push once  dextrose 50% Injectable 25 Gram(s) IV Push once  dextrose 50% Injectable 25 Gram(s) IV Push once  enoxaparin Injectable 40 milliGRAM(s) SubCutaneous every 12 hours  furosemide    Tablet 20 milliGRAM(s) Oral daily  insulin lispro (HumaLOG) corrective regimen sliding scale   SubCutaneous three times a day before meals  losartan 100 milliGRAM(s) Oral daily    MEDICATIONS  (PRN):  acetaminophen   Tablet .. 975 milliGRAM(s) Oral every 6 hours PRN Mild Pain (1 - 3), Moderate Pain (4 - 6), Severe Pain (7 - 10)  dextrose 40% Gel 15 Gram(s) Oral once PRN Blood Glucose LESS THAN 70 milliGRAM(s)/deciliter  glucagon  Injectable 1 milliGRAM(s) IntraMuscular once PRN Glucose LESS THAN 70 milligrams/deciliter  polyethylene glycol 3350 17 Gram(s) Oral daily PRN Constipation

## 2019-08-30 ENCOUNTER — INPATIENT (INPATIENT)
Facility: HOSPITAL | Age: 69
LOS: 9 days | Discharge: HOME HEALTH | End: 2019-09-09
Attending: INTERNAL MEDICINE | Admitting: INTERNAL MEDICINE
Payer: MEDICARE

## 2019-08-30 VITALS
OXYGEN SATURATION: 99 % | TEMPERATURE: 99 F | DIASTOLIC BLOOD PRESSURE: 90 MMHG | HEART RATE: 89 BPM | RESPIRATION RATE: 16 BRPM | SYSTOLIC BLOOD PRESSURE: 178 MMHG

## 2019-08-30 DIAGNOSIS — K56.609 UNSPECIFIED INTESTINAL OBSTRUCTION, UNSPECIFIED AS TO PARTIAL VERSUS COMPLETE OBSTRUCTION: ICD-10-CM

## 2019-08-30 PROBLEM — I10 ESSENTIAL (PRIMARY) HYPERTENSION: Chronic | Status: ACTIVE | Noted: 2019-08-09

## 2019-08-30 PROBLEM — I25.10 ATHEROSCLEROTIC HEART DISEASE OF NATIVE CORONARY ARTERY WITHOUT ANGINA PECTORIS: Chronic | Status: ACTIVE | Noted: 2019-08-09

## 2019-08-30 PROBLEM — E11.9 TYPE 2 DIABETES MELLITUS WITHOUT COMPLICATIONS: Chronic | Status: ACTIVE | Noted: 2019-08-09

## 2019-08-30 LAB
ALBUMIN SERPL ELPH-MCNC: 3.8 G/DL — SIGNIFICANT CHANGE UP (ref 3.3–5)
ALP SERPL-CCNC: 149 U/L — HIGH (ref 40–120)
ALT FLD-CCNC: 14 U/L — SIGNIFICANT CHANGE UP (ref 4–33)
ANION GAP SERPL CALC-SCNC: 12 MMO/L — SIGNIFICANT CHANGE UP (ref 7–14)
APTT BLD: 25.9 SEC — LOW (ref 27.5–36.3)
AST SERPL-CCNC: 19 U/L — SIGNIFICANT CHANGE UP (ref 4–32)
BASE EXCESS BLDV CALC-SCNC: 6.5 MMOL/L — SIGNIFICANT CHANGE UP
BASOPHILS # BLD AUTO: 0.02 K/UL — SIGNIFICANT CHANGE UP (ref 0–0.2)
BASOPHILS NFR BLD AUTO: 0.2 % — SIGNIFICANT CHANGE UP (ref 0–2)
BILIRUB SERPL-MCNC: 0.5 MG/DL — SIGNIFICANT CHANGE UP (ref 0.2–1.2)
BLD GP AB SCN SERPL QL: NEGATIVE — SIGNIFICANT CHANGE UP
BLOOD GAS VENOUS - CREATININE: SIGNIFICANT CHANGE UP MG/DL (ref 0.5–1.3)
BLOOD GAS VENOUS - FIO2: 21 — SIGNIFICANT CHANGE UP
BUN SERPL-MCNC: 16 MG/DL — SIGNIFICANT CHANGE UP (ref 7–23)
CALCIUM SERPL-MCNC: 9.7 MG/DL — SIGNIFICANT CHANGE UP (ref 8.4–10.5)
CHLORIDE BLDV-SCNC: 100 MMOL/L — SIGNIFICANT CHANGE UP (ref 96–108)
CHLORIDE SERPL-SCNC: 96 MMOL/L — LOW (ref 98–107)
CO2 SERPL-SCNC: 33 MMOL/L — HIGH (ref 22–31)
CREAT SERPL-MCNC: 0.54 MG/DL — SIGNIFICANT CHANGE UP (ref 0.5–1.3)
EOSINOPHIL # BLD AUTO: 0.06 K/UL — SIGNIFICANT CHANGE UP (ref 0–0.5)
EOSINOPHIL NFR BLD AUTO: 0.7 % — SIGNIFICANT CHANGE UP (ref 0–6)
GAS PNL BLDV: 134 MMOL/L — LOW (ref 136–146)
GLUCOSE BLDV-MCNC: 84 MG/DL — SIGNIFICANT CHANGE UP (ref 70–99)
GLUCOSE SERPL-MCNC: 95 MG/DL — SIGNIFICANT CHANGE UP (ref 70–99)
HCO3 BLDV-SCNC: 31 MMOL/L — HIGH (ref 20–27)
HCT VFR BLD CALC: 36.3 % — SIGNIFICANT CHANGE UP (ref 34.5–45)
HCT VFR BLDV CALC: 31.9 % — LOW (ref 34.5–45)
HGB BLD-MCNC: 10.4 G/DL — LOW (ref 11.5–15.5)
HGB BLDV-MCNC: 10.3 G/DL — LOW (ref 11.5–15.5)
IMM GRANULOCYTES NFR BLD AUTO: 0.3 % — SIGNIFICANT CHANGE UP (ref 0–1.5)
INR BLD: 1.12 — SIGNIFICANT CHANGE UP (ref 0.88–1.17)
LACTATE BLDV-MCNC: 1.2 MMOL/L — SIGNIFICANT CHANGE UP (ref 0.5–2)
LYMPHOCYTES # BLD AUTO: 1.6 K/UL — SIGNIFICANT CHANGE UP (ref 1–3.3)
LYMPHOCYTES # BLD AUTO: 18.2 % — SIGNIFICANT CHANGE UP (ref 13–44)
MAGNESIUM SERPL-MCNC: 1.6 MG/DL — SIGNIFICANT CHANGE UP (ref 1.6–2.6)
MCHC RBC-ENTMCNC: 23.6 PG — LOW (ref 27–34)
MCHC RBC-ENTMCNC: 28.7 % — LOW (ref 32–36)
MCV RBC AUTO: 82.3 FL — SIGNIFICANT CHANGE UP (ref 80–100)
MONOCYTES # BLD AUTO: 1.16 K/UL — HIGH (ref 0–0.9)
MONOCYTES NFR BLD AUTO: 13.2 % — SIGNIFICANT CHANGE UP (ref 2–14)
NEUTROPHILS # BLD AUTO: 5.92 K/UL — SIGNIFICANT CHANGE UP (ref 1.8–7.4)
NEUTROPHILS NFR BLD AUTO: 67.4 % — SIGNIFICANT CHANGE UP (ref 43–77)
NRBC # FLD: 0 K/UL — SIGNIFICANT CHANGE UP (ref 0–0)
PCO2 BLDV: 39 MMHG — LOW (ref 41–51)
PH BLDV: 7.5 PH — HIGH (ref 7.32–7.43)
PHOSPHATE SERPL-MCNC: 2.9 MG/DL — SIGNIFICANT CHANGE UP (ref 2.5–4.5)
PLATELET # BLD AUTO: 301 K/UL — SIGNIFICANT CHANGE UP (ref 150–400)
PMV BLD: 10.2 FL — SIGNIFICANT CHANGE UP (ref 7–13)
PO2 BLDV: 178 MMHG — HIGH (ref 35–40)
POTASSIUM BLDV-SCNC: 3.2 MMOL/L — LOW (ref 3.4–4.5)
POTASSIUM SERPL-MCNC: 3.7 MMOL/L — SIGNIFICANT CHANGE UP (ref 3.5–5.3)
POTASSIUM SERPL-SCNC: 3.7 MMOL/L — SIGNIFICANT CHANGE UP (ref 3.5–5.3)
PROT SERPL-MCNC: 8.1 G/DL — SIGNIFICANT CHANGE UP (ref 6–8.3)
PROTHROM AB SERPL-ACNC: 12.8 SEC — SIGNIFICANT CHANGE UP (ref 9.8–13.1)
RBC # BLD: 4.41 M/UL — SIGNIFICANT CHANGE UP (ref 3.8–5.2)
RBC # FLD: 15.6 % — HIGH (ref 10.3–14.5)
RH IG SCN BLD-IMP: NEGATIVE — SIGNIFICANT CHANGE UP
SAO2 % BLDV: 98.8 % — HIGH (ref 60–85)
SODIUM SERPL-SCNC: 141 MMOL/L — SIGNIFICANT CHANGE UP (ref 135–145)
WBC # BLD: 8.79 K/UL — SIGNIFICANT CHANGE UP (ref 3.8–10.5)
WBC # FLD AUTO: 8.79 K/UL — SIGNIFICANT CHANGE UP (ref 3.8–10.5)

## 2019-08-30 PROCEDURE — 99222 1ST HOSP IP/OBS MODERATE 55: CPT | Mod: 57

## 2019-08-30 PROCEDURE — 74177 CT ABD & PELVIS W/CONTRAST: CPT | Mod: 26

## 2019-08-30 RX ORDER — SODIUM CHLORIDE 9 MG/ML
1000 INJECTION, SOLUTION INTRAVENOUS
Refills: 0 | Status: DISCONTINUED | OUTPATIENT
Start: 2019-08-30 | End: 2019-09-01

## 2019-08-30 RX ORDER — DEXTROSE 50 % IN WATER 50 %
25 SYRINGE (ML) INTRAVENOUS ONCE
Refills: 0 | Status: DISCONTINUED | OUTPATIENT
Start: 2019-08-30 | End: 2019-09-09

## 2019-08-30 RX ORDER — ONDANSETRON 8 MG/1
4 TABLET, FILM COATED ORAL ONCE
Refills: 0 | Status: COMPLETED | OUTPATIENT
Start: 2019-08-30 | End: 2019-08-30

## 2019-08-30 RX ORDER — DEXTROSE 50 % IN WATER 50 %
12.5 SYRINGE (ML) INTRAVENOUS ONCE
Refills: 0 | Status: DISCONTINUED | OUTPATIENT
Start: 2019-08-30 | End: 2019-09-09

## 2019-08-30 RX ORDER — INSULIN LISPRO 100/ML
VIAL (ML) SUBCUTANEOUS EVERY 6 HOURS
Refills: 0 | Status: DISCONTINUED | OUTPATIENT
Start: 2019-08-30 | End: 2019-09-08

## 2019-08-30 RX ORDER — DEXTROSE 50 % IN WATER 50 %
15 SYRINGE (ML) INTRAVENOUS ONCE
Refills: 0 | Status: DISCONTINUED | OUTPATIENT
Start: 2019-08-30 | End: 2019-09-09

## 2019-08-30 RX ORDER — SODIUM CHLORIDE 9 MG/ML
1000 INJECTION, SOLUTION INTRAVENOUS
Refills: 0 | Status: DISCONTINUED | OUTPATIENT
Start: 2019-08-30 | End: 2019-09-04

## 2019-08-30 RX ORDER — ENOXAPARIN SODIUM 100 MG/ML
40 INJECTION SUBCUTANEOUS DAILY
Refills: 0 | Status: DISCONTINUED | OUTPATIENT
Start: 2019-08-30 | End: 2019-09-02

## 2019-08-30 RX ORDER — SODIUM CHLORIDE 9 MG/ML
1000 INJECTION, SOLUTION INTRAVENOUS ONCE
Refills: 0 | Status: COMPLETED | OUTPATIENT
Start: 2019-08-30 | End: 2019-08-30

## 2019-08-30 RX ORDER — GLUCAGON INJECTION, SOLUTION 0.5 MG/.1ML
1 INJECTION, SOLUTION SUBCUTANEOUS ONCE
Refills: 0 | Status: DISCONTINUED | OUTPATIENT
Start: 2019-08-30 | End: 2019-09-09

## 2019-08-30 RX ORDER — ACETAMINOPHEN 500 MG
1000 TABLET ORAL ONCE
Refills: 0 | Status: COMPLETED | OUTPATIENT
Start: 2019-08-30 | End: 2019-08-30

## 2019-08-30 RX ORDER — LOSARTAN POTASSIUM 100 MG/1
100 TABLET, FILM COATED ORAL DAILY
Refills: 0 | Status: DISCONTINUED | OUTPATIENT
Start: 2019-08-30 | End: 2019-08-31

## 2019-08-30 RX ADMIN — SODIUM CHLORIDE 1000 MILLILITER(S): 9 INJECTION, SOLUTION INTRAVENOUS at 20:20

## 2019-08-30 RX ADMIN — ONDANSETRON 4 MILLIGRAM(S): 8 TABLET, FILM COATED ORAL at 17:57

## 2019-08-30 RX ADMIN — SODIUM CHLORIDE 1000 MILLILITER(S): 9 INJECTION, SOLUTION INTRAVENOUS at 17:41

## 2019-08-30 RX ADMIN — Medication 400 MILLIGRAM(S): at 17:57

## 2019-08-30 RX ADMIN — Medication 1000 MILLIGRAM(S): at 18:57

## 2019-08-30 NOTE — H&P ADULT - ASSESSMENT
Plan  - Admit to Dr. Willams  - Pain control PRN after abd exam  - NPO w/ IV fluid  - NGT to low continuous wall suction  - DVT ppx: Lovenox  - Discussed with Dr. Willams 67yo F with MHx of morbid obesity (BMI 50), DM2, HTN, and known ventral hernia presented 8/30 from rehab after previous admission on 8/10 for SBO (d/c'd on 8/16) with c/c of worsening intermittant abd pain x3 days associated with nausea, multiple episodes of NBNB vomiting, and PO intolerance. SBO on 8/10 resolved resolved with nonoperative mngt and she was discharged to rehab. CT showed SBO with transition point in the known ventral hernia similar to prior admission and redemonstration of ~18cm left adnexal mass.    Plan  - Admit to Dr. Willams  - Pain control PRN after abd exam  - NPO w/ IV fluid  - NGT to low continuous wall suction  - DVT ppx: Lovenox  - Discussed with Dr. Willams

## 2019-08-30 NOTE — ED PROVIDER NOTE - OBJECTIVE STATEMENT
68 year old female with PMH of morbidly obese BMI 50, DM, HTN pw cc of abd pain    Pt had SBO on 8/10, resolved on its own and was discharged to rehab. In rehab has not passed gas or stool since Wednesday, none since then. Xray was taken today at rehab and was then transferred to ER for concern of SBO.  Has also been vomiting with PO intake. No hx of intrabdominal surgeries, was told sbo was 2/2 hernia last time, hernia was not repaired. Was also found to have a mass at that time.   Meds: Tylenol @ 1030am    No allergies

## 2019-08-30 NOTE — H&P ADULT - NSHPLABSRESULTS_GEN_ALL_CORE
10.4                  Neurophils% (auto):   67.4   (08-30 @ 17:38):    8.79 )-----------(301          Lymphocytes% (auto):  18.2                                          36.3                   Eosinphils% (auto):   0.7      Manual%: Neutrophils x    ; Lymphocytes x    ; Eosinophils x    ; Bands%: x    ; Blasts x          08-30    141  |  96<L>  |  16  ----------------------------<  95  3.7   |  33<H>  |  0.54    Ca    9.7      30 Aug 2019 17:38  Phos  2.9     08-30  Mg     1.6     08-30    TPro  8.1  /  Alb  3.8  /  TBili  0.5  /  DBili  x   /  AST  19  /  ALT  14  /  AlkPhos  149<H>  08-30    ( 08-30 @ 17:38 )   PT: 12.8 SEC;   INR: 1.12   aPTT: 25.9 SEC        RECENT CULTURES:      Imaging:  EXAM:  CT ABDOMEN AND PELVIS OC IC      PROCEDURE DATE:  Aug 30 2019     INTERPRETATION:  CLINICAL INFORMATION: Evaluate for small bowel   obstruction. Presented with SBO on 8/10 which self resolved and patient   was subsequently discharged to rehabilitation. Has not passed gas or   stool since Wednesday. Transferred from rehabilitation for concerns for   small bowel obstruction.    COMPARISON: CT abdomen pelvis from 8/10/2019..    PROCEDURE:   CT of the Abdomen and Pelvis was performed with intravenous contrast.   Intravenous contrast: 97 ml Omnipaque 350. 3 ml discarded.  Oral contrast: None.  Sagittal and coronal reformats were performed.    FINDINGS:    LOWER CHEST: Coronary artery calcifications. Mild distal esophageal wall   thickening.    LIVER: Within normal limits.  BILE DUCTS: Normal caliber.  GALLBLADDER: Cholelithiasis.  SPLEEN: Within normal limits.  PANCREAS: Within normal limits.  ADRENALS: Nodular thickening of both adrenal mass with an additional 2.1   cm indeterminate left adrenal nodule.  KIDNEYS/URETERS: Within normal limits.    BLADDER: Within normal limits.  REPRODUCTIVE ORGANS: Uterus within normal limits. Left adnexal cystic   lesion with peripheral calcification measures 17.8 x 13.0 x 18.0 cm.     BOWEL: Small bowel obstruction with transition point at the level of an   umbilical hernia  PERITONEUM: Small fluid within the umbilical hernia sac..  VESSELS: Within normal limits.  RETROPERITONEUM/LYMPH NODES: No lymphadenopathy.    ABDOMINAL WALL: As above.  BONES: Degenerative changes.    IMPRESSION:     Small bowel obstruction with transition point at the level of an   umbilical hernia.

## 2019-08-30 NOTE — ED PROVIDER NOTE - ATTENDING CONTRIBUTION TO CARE
I have personally performed a face to face bedside history and physical examination of this patient. I have discussed the history, examination, review of systems, assessment and plan of management with the resident. I have reviewed the electronic medical record and amended it to reflect my history, review of systems, physical exam, assessment and plan.    68 year old female with PMH of morbidly obese BMI 50, DM, HTN pw cc of abd pain.  Pt had SBO on 8/10, resolved on its own and was discharged to rehab. In rehab has not passed gas or stool since Wednesday, none since then. Xray was taken today at rehab and was then transferred to ER for concern of SBO.  Has also been vomiting with PO intake. No hx of intrabdominal surgeries, was told sbo was 2/2 hernia last time, hernia was not repaired. Was also found to have a mass at that time.  VSS af.  Exam non-toxic appearing, aaox3, abdomen distended, minimally tender in all quadrants, non-peritoneal.  Suspect sbo.  Will send labs, ct, ivf, pain control.  Dispo pending.

## 2019-08-30 NOTE — H&P ADULT - NSHPPHYSICALEXAM_GEN_ALL_CORE
Physical Exam:  Gen: NAD  LS: nml respiratory effort  Card: pulse regularly present  GI: abd soft, mild mid abd tenderness, palpable nonreducible hernia defect near umbilicus with gurgling from loops of bowel  Ext: warm

## 2019-08-30 NOTE — ED ADULT NURSE NOTE - OBJECTIVE STATEMENT
Receive pt in spot 26  from [facility] alert and oriented x 3 c/o and pain and N/V.  Denies diarrhea, constipation, chills, chest pain, dizziness.  Resp unlabored.  Skin intact.  IV placed . Lab sent.  NS in progress

## 2019-08-30 NOTE — H&P ADULT - HISTORY OF PRESENT ILLNESS
68 year old female with PMH of morbidly obese BMI 50, DM, HTN pw cc of abd pain. Pt had SBO on 8/10, resolved on its own and was discharged to rehab. In rehab has not passed gas or stool since Wednesday, none since then. Xray was taken today at rehab and was then transferred to ER for concern of SBO.  Has also been vomiting with PO intake. No hx of intrabdominal surgeries, was told sbo was 2/2 hernia last time, hernia was not repaired. Was also found to have a mass at that time. 67yo F with MHx of morbid obesity (BMI 50), DM2, HTN, and known ventral hernia presented 8/30 from rehab after previous admission on 8/10 for SBO (d/c'd on 8/16) with c/c of worsening intermittant abd pain x3 days associated with nausea, multiple episodes of NBNB vomiting, and PO intolerance. SBO on 8/10 resolved resolved with nonoperative mngt and she was discharged to rehab. In rehab patient's last flatus was on 8/27 and last BM on 8/28 after an enema. No BM or flatus thereafter. Xray was taken at rehab and patient was transferred to [facility] ER for concern of SBO. In the ED, CT showed SBO with transition point in the known ventral hernia similar to prior admission and redemonstration of ~18cm left adnexal mass. An NGT was subsequently placed. Hernia not repaired on previous admission due to body habitus and adnexal mass. Has also been vomiting with PO intake. Patient denies dizziness, CP, SOB, or dysuria. 67yo F with MHx of morbid obesity (BMI 50), DM2, HTN, and known ventral hernia presented 8/30 from rehab after previous admission on 8/10 for SBO (d/c'd on 8/16) with c/c of worsening intermittant abd pain x3 days associated with nausea, multiple episodes of NBNB vomiting, and PO intolerance. SBO on 8/10 resolved resolved with nonoperative mngt and she was discharged to rehab. In rehab patient's last flatus was on 8/27 and last BM on 8/28 after an enema. No BM or flatus thereafter. Xray was taken at rehab and patient was transferred to [facility] ER for concern of SBO. In the ED, CT showed SBO with transition point in the known ventral hernia similar to prior admission and redemonstration of ~18cm left adnexal mass. An NGT was subsequently placed. Hernia not repaired on previous admission due to body habitus and adnexal mass. Patient denies dizziness, CP, SOB, or dysuria.

## 2019-08-30 NOTE — ED PROVIDER NOTE - CLINICAL SUMMARY MEDICAL DECISION MAKING FREE TEXT BOX
68 year old female with PMH of morbidly obese BMI 50, DM, HTN pw cc of abd pain. Decreased bm, previous sbo, hyperactive bowel sounds concerning for sbo, will get CT a/p w/ IV and PO contrast, fluids, pain control and reassess. Pain is distal from hernia doubt hernia related SBO. patient vss clinically stable

## 2019-08-30 NOTE — ED ADULT NURSE REASSESSMENT NOTE - NS ED NURSE REASSESS COMMENT FT1
report received from RN Virginia, pt A&Ox4, appears in no distress. MD Evans from surgery at bedside for NG tube insertion in left nare. pt tolerated procedure well, set up to low continuous suction. pale yellow fluid draining well. initial output 100ml.

## 2019-08-31 LAB
ANION GAP SERPL CALC-SCNC: 10 MMO/L — SIGNIFICANT CHANGE UP (ref 7–14)
ANION GAP SERPL CALC-SCNC: 10 MMO/L — SIGNIFICANT CHANGE UP (ref 7–14)
BUN SERPL-MCNC: 11 MG/DL — SIGNIFICANT CHANGE UP (ref 7–23)
BUN SERPL-MCNC: 12 MG/DL — SIGNIFICANT CHANGE UP (ref 7–23)
CALCIUM SERPL-MCNC: 8.5 MG/DL — SIGNIFICANT CHANGE UP (ref 8.4–10.5)
CALCIUM SERPL-MCNC: 8.7 MG/DL — SIGNIFICANT CHANGE UP (ref 8.4–10.5)
CHLORIDE SERPL-SCNC: 102 MMOL/L — SIGNIFICANT CHANGE UP (ref 98–107)
CHLORIDE SERPL-SCNC: 99 MMOL/L — SIGNIFICANT CHANGE UP (ref 98–107)
CO2 SERPL-SCNC: 31 MMOL/L — SIGNIFICANT CHANGE UP (ref 22–31)
CO2 SERPL-SCNC: 31 MMOL/L — SIGNIFICANT CHANGE UP (ref 22–31)
CREAT SERPL-MCNC: 0.53 MG/DL — SIGNIFICANT CHANGE UP (ref 0.5–1.3)
CREAT SERPL-MCNC: 0.55 MG/DL — SIGNIFICANT CHANGE UP (ref 0.5–1.3)
GLUCOSE BLDC GLUCOMTR-MCNC: 113 MG/DL — HIGH (ref 70–99)
GLUCOSE BLDC GLUCOMTR-MCNC: 129 MG/DL — HIGH (ref 70–99)
GLUCOSE BLDC GLUCOMTR-MCNC: 131 MG/DL — HIGH (ref 70–99)
GLUCOSE BLDC GLUCOMTR-MCNC: 149 MG/DL — HIGH (ref 70–99)
GLUCOSE SERPL-MCNC: 115 MG/DL — HIGH (ref 70–99)
GLUCOSE SERPL-MCNC: 133 MG/DL — HIGH (ref 70–99)
HCT VFR BLD CALC: 30.7 % — LOW (ref 34.5–45)
HCT VFR BLD CALC: 33.2 % — LOW (ref 34.5–45)
HGB BLD-MCNC: 9 G/DL — LOW (ref 11.5–15.5)
HGB BLD-MCNC: 9.4 G/DL — LOW (ref 11.5–15.5)
LACTATE SERPL-SCNC: 1.1 MMOL/L — SIGNIFICANT CHANGE UP (ref 0.5–2)
MAGNESIUM SERPL-MCNC: 1.6 MG/DL — SIGNIFICANT CHANGE UP (ref 1.6–2.6)
MCHC RBC-ENTMCNC: 23.5 PG — LOW (ref 27–34)
MCHC RBC-ENTMCNC: 23.9 PG — LOW (ref 27–34)
MCHC RBC-ENTMCNC: 28.3 % — LOW (ref 32–36)
MCHC RBC-ENTMCNC: 29.3 % — LOW (ref 32–36)
MCV RBC AUTO: 81.4 FL — SIGNIFICANT CHANGE UP (ref 80–100)
MCV RBC AUTO: 83 FL — SIGNIFICANT CHANGE UP (ref 80–100)
NRBC # FLD: 0 K/UL — SIGNIFICANT CHANGE UP (ref 0–0)
NRBC # FLD: 0 K/UL — SIGNIFICANT CHANGE UP (ref 0–0)
PHOSPHATE SERPL-MCNC: 3.4 MG/DL — SIGNIFICANT CHANGE UP (ref 2.5–4.5)
PLATELET # BLD AUTO: 246 K/UL — SIGNIFICANT CHANGE UP (ref 150–400)
PLATELET # BLD AUTO: 268 K/UL — SIGNIFICANT CHANGE UP (ref 150–400)
PMV BLD: 11 FL — SIGNIFICANT CHANGE UP (ref 7–13)
PMV BLD: 9.6 FL — SIGNIFICANT CHANGE UP (ref 7–13)
POTASSIUM SERPL-MCNC: 3 MMOL/L — LOW (ref 3.5–5.3)
POTASSIUM SERPL-MCNC: 3.3 MMOL/L — LOW (ref 3.5–5.3)
POTASSIUM SERPL-SCNC: 3 MMOL/L — LOW (ref 3.5–5.3)
POTASSIUM SERPL-SCNC: 3.3 MMOL/L — LOW (ref 3.5–5.3)
RBC # BLD: 3.77 M/UL — LOW (ref 3.8–5.2)
RBC # BLD: 4 M/UL — SIGNIFICANT CHANGE UP (ref 3.8–5.2)
RBC # FLD: 15.6 % — HIGH (ref 10.3–14.5)
RBC # FLD: 15.7 % — HIGH (ref 10.3–14.5)
SODIUM SERPL-SCNC: 140 MMOL/L — SIGNIFICANT CHANGE UP (ref 135–145)
SODIUM SERPL-SCNC: 143 MMOL/L — SIGNIFICANT CHANGE UP (ref 135–145)
WBC # BLD: 7.35 K/UL — SIGNIFICANT CHANGE UP (ref 3.8–10.5)
WBC # BLD: 8.97 K/UL — SIGNIFICANT CHANGE UP (ref 3.8–10.5)
WBC # FLD AUTO: 7.35 K/UL — SIGNIFICANT CHANGE UP (ref 3.8–10.5)
WBC # FLD AUTO: 8.97 K/UL — SIGNIFICANT CHANGE UP (ref 3.8–10.5)

## 2019-08-31 PROCEDURE — 71045 X-RAY EXAM CHEST 1 VIEW: CPT | Mod: 26

## 2019-08-31 PROCEDURE — 99223 1ST HOSP IP/OBS HIGH 75: CPT

## 2019-08-31 RX ORDER — ACETAMINOPHEN 500 MG
1000 TABLET ORAL ONCE
Refills: 0 | Status: COMPLETED | OUTPATIENT
Start: 2019-08-31 | End: 2019-08-31

## 2019-08-31 RX ORDER — PANTOPRAZOLE SODIUM 20 MG/1
40 TABLET, DELAYED RELEASE ORAL DAILY
Refills: 0 | Status: DISCONTINUED | OUTPATIENT
Start: 2019-08-31 | End: 2019-09-08

## 2019-08-31 RX ORDER — POTASSIUM CHLORIDE 20 MEQ
10 PACKET (EA) ORAL
Refills: 0 | Status: DISCONTINUED | OUTPATIENT
Start: 2019-08-31 | End: 2019-09-01

## 2019-08-31 RX ORDER — POTASSIUM CHLORIDE 20 MEQ
10 PACKET (EA) ORAL
Refills: 0 | Status: COMPLETED | OUTPATIENT
Start: 2019-08-31 | End: 2019-08-31

## 2019-08-31 RX ORDER — MAGNESIUM SULFATE 500 MG/ML
1 VIAL (ML) INJECTION ONCE
Refills: 0 | Status: COMPLETED | OUTPATIENT
Start: 2019-08-31 | End: 2019-08-31

## 2019-08-31 RX ADMIN — Medication 100 GRAM(S): at 19:41

## 2019-08-31 RX ADMIN — Medication 1000 MILLIGRAM(S): at 16:50

## 2019-08-31 RX ADMIN — Medication 1.25 MILLIGRAM(S): at 02:48

## 2019-08-31 RX ADMIN — SODIUM CHLORIDE 150 MILLILITER(S): 9 INJECTION, SOLUTION INTRAVENOUS at 02:48

## 2019-08-31 RX ADMIN — Medication 100 MILLIEQUIVALENT(S): at 12:14

## 2019-08-31 RX ADMIN — Medication 100 MILLIEQUIVALENT(S): at 14:25

## 2019-08-31 RX ADMIN — Medication 400 MILLIGRAM(S): at 16:09

## 2019-08-31 RX ADMIN — Medication 400 MILLIGRAM(S): at 10:25

## 2019-08-31 RX ADMIN — Medication 100 MILLIEQUIVALENT(S): at 10:12

## 2019-08-31 RX ADMIN — ENOXAPARIN SODIUM 40 MILLIGRAM(S): 100 INJECTION SUBCUTANEOUS at 12:15

## 2019-08-31 RX ADMIN — Medication 1000 MILLIGRAM(S): at 11:00

## 2019-08-31 NOTE — CONSULT NOTE ADULT - ASSESSMENT
68F with non-insulin dependent DM, CAD? presented for ventral hernia repair. Patient reports no significant cardiac history. denied exertional symptoms but exertion is limited due to arthritis. Supposedly had full cardiac workup at [facility]. She also had poor dentition with high risk of tooth aspiration during intubation.    - EKG from 8/9 with poor baseline, there is deep Q in lead III, other leads are difficulty to assess.   - please repeat EKG, TTE given h/o CAD and difficult to assess METs.   - obtain stress test and echo result from [facility] if possible.   - Please consult Dental to evaluate need for tooth extraction prior to surgery.   - c/w ISS for DM.  - will f/u with further assessment depending on above results.     Discussed with Surgical resident    Please call with questions.     Luiz Young MD  Pager [phone]

## 2019-08-31 NOTE — PROGRESS NOTE ADULT - ASSESSMENT
Assessment:  Pt is a 68F presenting with epigastric abdominal pain, with CT showing ventral hernia with small bowel and fluid in hernia sac and a large ovarian cyst 10cm with calcification. Doing well with return of bowel function. Plan for potential OR Tuesday in conjunction with GYN for removal of ovarian cyst and ventral hernia repair.     Plan:  - GYN aware, will follow up and visit patient today  - Diet: regular  - Continue to monitor GI function  - Arthritic pain in ankles/back, on standing Tylenol PRN.  - PT: rehab facility, case management aware and patient amenable pending operative plan    Dispo pending authorization    B Team Surgery  [id number]

## 2019-08-31 NOTE — PROGRESS NOTE ADULT - SUBJECTIVE AND OBJECTIVE BOX
Interval Events:    No acute events overnight    S: Patient doing well, denies fevers, chills, nausea, emesis, chest pain, SOB.  Pain is well controlled. Tolerating PO w/o N/V.  +/- F/BM.    O:     Vital Signs Last 24 Hrs  T(C): 37 (31 Aug 2019 05:51), Max: 37.7 (31 Aug 2019 00:04)  T(F): 98.6 (31 Aug 2019 05:51), Max: 99.9 (31 Aug 2019 00:04)  HR: 103 (31 Aug 2019 05:51) (89 - 103)  BP: 133/70 (31 Aug 2019 05:51) (133/70 - 178/90)  BP(mean): --  RR: 18 (31 Aug 2019 05:51) (16 - 20)  SpO2: 94% (31 Aug 2019 05:51) (94% - 100%)    I&O's Detail    30 Aug 2019 07:01  -  31 Aug 2019 07:00  --------------------------------------------------------  IN:    dextrose 5% + sodium chloride 0.9%.: 450 mL  Total IN: 450 mL    OUT:    Voided: 151 mL  Total OUT: 151 mL    Total NET: 299 mL      31 Aug 2019 07:01  -  31 Aug 2019 09:59  --------------------------------------------------------  IN:  Total IN: 0 mL    OUT:    Nasoenteral Tube: 400 mL  Total OUT: 400 mL    Total NET: -400 mL          Physical Exam:    Gen: Well-developed, well-nourished in no acute distress  Resp: regular nonlabored respirations  CV: Regular rate and rhythm, extremities WWP  GI: Soft, tender to palpation, non-distended.  No masses.  MSK: Moves all extremities equally  Skin: No rashes    Labs:  08-31    140  |  99  |  12  ----------------------------<  115<H>  3.0<L>   |  31  |  0.53    Ca    8.7      31 Aug 2019 06:15  Phos  3.4     08-31  Mg     1.6     08-31    TPro  8.1  /  Alb  3.8  /  TBili  0.5  /  DBili  x   /  AST  19  /  ALT  14  /  AlkPhos  149<H>  08-30                            9.4    7.35  )-----------( 268      ( 31 Aug 2019 06:15 )             33.2       MEDICATIONS  (STANDING):  atorvastatin 20 milliGRAM(s) Oral at bedtime  dextrose 50% Injectable 12.5 Gram(s) IV Push once  dextrose 50% Injectable 25 Gram(s) IV Push once  dextrose 50% Injectable 25 Gram(s) IV Push once  enoxaparin Injectable 40 milliGRAM(s) SubCutaneous every 12 hours  furosemide    Tablet 20 milliGRAM(s) Oral daily  insulin lispro (HumaLOG) corrective regimen sliding scale   SubCutaneous three times a day before meals  losartan 100 milliGRAM(s) Oral daily    MEDICATIONS  (PRN):  acetaminophen   Tablet .. 975 milliGRAM(s) Oral every 6 hours PRN Mild Pain (1 - 3), Moderate Pain (4 - 6), Severe Pain (7 - 10)  dextrose 40% Gel 15 Gram(s) Oral once PRN Blood Glucose LESS THAN 70 milliGRAM(s)/deciliter  glucagon  Injectable 1 milliGRAM(s) IntraMuscular once PRN Glucose LESS THAN 70 milligrams/deciliter  polyethylene glycol 3350 17 Gram(s) Oral daily PRN Constipation

## 2019-08-31 NOTE — CONSULT NOTE ADULT - SUBJECTIVE AND OBJECTIVE BOX
HPI:  68F with PMH HTN, T2DM (not on insulin), Morbid obesity, Arthritis, ventral hernia admitted to Surgery for abdominal pain secondary to incarcerated ventral hernia cause SBO. Medicine consulted for pre-operative medical optimization for planned Ventral hernia repair on 9/3/19. Patient reports prior to multiple admission for abdominal hernia and SBO, she was able to ambulate on flat surfaces with no limitations, but unable to climb stairs due to arthritis. She had period of LE edema due to a diabetic medication which was treated with diuretics. While hospitalized at [facility] for same issue, she had Echo and stress test performed with normal results. She denied exertional chest pain or SOB, no orthopnea, no nocturnal dyspnea. Patient also reported needing dental for tooth extraction prior to surgery during prior admission.      ROS:   CONSTITUTIONAL: No fever; No chills; No night sweats; No weight loss; No fatigue  EYES: No eye pain; No blurry vision  ENMT:  No difficulty hearing; No sinus or throat pain  NECK: No pain or stiffness  RESPIRATORY: No cough; No wheezing; No hemoptysis; No shortness of breath; No sputum production  CARDIOVASCULAR: No chest pain; No palpitations; No leg swelling  GASTROINTESTINAL: +abdominal pain; +nausea; No vomiting; No hematemesis; No diarrhea; No constipation. No melena or hematochezia.  GENITOURINARY: No dysuria; No frequency; No hematuria; No incontinence  NEUROLOGICAL: No headaches; No loss of strength; No numbness  SKIN: No itching/burning; No rashes or lesions   MUSCULOSKELETAL: +joint pain; no swelling; No muscle, back, or extremity pain  HEME/LYMPH: No easy bruising, or bleeding gums     Allergy: NKDA    PMH:  SBO (small bowel obstruction) [K56.609]  CAD (coronary artery disease) [I25.10]  DM (diabetes mellitus) [E11.9]  HTN (hypertension) [I10]    PSH:  No significant past surgical history [[id number]]    FHx:  Sister had cancer  Mother  from brain aneurysm rupture.    SHx:  denied h/o smoking, EtOH, or illicit drug use.     Physical Exam:  T(C): 37 (19 @ 05:51), Max: 37.7 (19 @ 00:04)  HR: 103 (19 @ 05:51) (94 - 103)  BP: 133/70 (19 @ 05:51) (133/70 - 151/66)  RR: 18 (19 @ 05:51) (16 - 20)  SpO2: 94% (19 @ 05:51) (94% - 100%)    GENERAL: no apparent distress, on room air, morbidly obese female.  HEAD:  Atraumatic, Normocephalic  EYES: EOMI, PERRLA, conjunctiva and sclera clear b/l  NECK: No cervical lymphadenopathy; no obvious masses.   CHEST/LUNG: Clear to auscultation bilaterally; No wheezing or crackles  HEART: Regular rate and rhythm; No obvious murmurs; nl S1/S2; No peripheral edema  ABDOMEN: +NGT in place with coffee ground fluids in the tube; Soft, Nontender, Nondistended; Bowel sounds present  EXTREMITIES:  2+ Peripheral Pulses; No joint swelling.  PSYCH: normal affect, calm demeanor  NEUROLOGY: Awake and alert; CN 2-12 grossly intact; no obvious FND  SKIN: No rashes or lesions

## 2019-09-01 DIAGNOSIS — N94.89 OTHER SPECIFIED CONDITIONS ASSOCIATED WITH FEMALE GENITAL ORGANS AND MENSTRUAL CYCLE: ICD-10-CM

## 2019-09-01 LAB
ANION GAP SERPL CALC-SCNC: 9 MMO/L — SIGNIFICANT CHANGE UP (ref 7–14)
BUN SERPL-MCNC: 8 MG/DL — SIGNIFICANT CHANGE UP (ref 7–23)
CALCIUM SERPL-MCNC: 8.2 MG/DL — LOW (ref 8.4–10.5)
CHLORIDE SERPL-SCNC: 103 MMOL/L — SIGNIFICANT CHANGE UP (ref 98–107)
CO2 SERPL-SCNC: 32 MMOL/L — HIGH (ref 22–31)
CREAT SERPL-MCNC: 0.49 MG/DL — LOW (ref 0.5–1.3)
GLUCOSE BLDC GLUCOMTR-MCNC: 110 MG/DL — HIGH (ref 70–99)
GLUCOSE BLDC GLUCOMTR-MCNC: 134 MG/DL — HIGH (ref 70–99)
GLUCOSE BLDC GLUCOMTR-MCNC: 144 MG/DL — HIGH (ref 70–99)
GLUCOSE BLDC GLUCOMTR-MCNC: 185 MG/DL — HIGH (ref 70–99)
GLUCOSE SERPL-MCNC: 178 MG/DL — HIGH (ref 70–99)
HCT VFR BLD CALC: 31.3 % — LOW (ref 34.5–45)
HGB BLD-MCNC: 8.9 G/DL — LOW (ref 11.5–15.5)
MAGNESIUM SERPL-MCNC: 1.9 MG/DL — SIGNIFICANT CHANGE UP (ref 1.6–2.6)
MCHC RBC-ENTMCNC: 23.4 PG — LOW (ref 27–34)
MCHC RBC-ENTMCNC: 28.4 % — LOW (ref 32–36)
MCV RBC AUTO: 82.4 FL — SIGNIFICANT CHANGE UP (ref 80–100)
NRBC # FLD: 0.02 K/UL — SIGNIFICANT CHANGE UP (ref 0–0)
PHOSPHATE SERPL-MCNC: 2.6 MG/DL — SIGNIFICANT CHANGE UP (ref 2.5–4.5)
PLATELET # BLD AUTO: 231 K/UL — SIGNIFICANT CHANGE UP (ref 150–400)
PMV BLD: 10.2 FL — SIGNIFICANT CHANGE UP (ref 7–13)
POTASSIUM SERPL-MCNC: 3.4 MMOL/L — LOW (ref 3.5–5.3)
POTASSIUM SERPL-SCNC: 3.4 MMOL/L — LOW (ref 3.5–5.3)
RBC # BLD: 3.8 M/UL — SIGNIFICANT CHANGE UP (ref 3.8–5.2)
RBC # FLD: 15.7 % — HIGH (ref 10.3–14.5)
SODIUM SERPL-SCNC: 144 MMOL/L — SIGNIFICANT CHANGE UP (ref 135–145)
WBC # BLD: 9.62 K/UL — SIGNIFICANT CHANGE UP (ref 3.8–10.5)
WBC # FLD AUTO: 9.62 K/UL — SIGNIFICANT CHANGE UP (ref 3.8–10.5)

## 2019-09-01 PROCEDURE — 93306 TTE W/DOPPLER COMPLETE: CPT | Mod: 26

## 2019-09-01 PROCEDURE — 93010 ELECTROCARDIOGRAM REPORT: CPT

## 2019-09-01 RX ORDER — DEXTROSE MONOHYDRATE, SODIUM CHLORIDE, AND POTASSIUM CHLORIDE 50; .745; 4.5 G/1000ML; G/1000ML; G/1000ML
1000 INJECTION, SOLUTION INTRAVENOUS
Refills: 0 | Status: DISCONTINUED | OUTPATIENT
Start: 2019-09-01 | End: 2019-09-06

## 2019-09-01 RX ORDER — POTASSIUM CHLORIDE 20 MEQ
10 PACKET (EA) ORAL ONCE
Refills: 0 | Status: COMPLETED | OUTPATIENT
Start: 2019-09-01 | End: 2019-09-01

## 2019-09-01 RX ORDER — ACETAMINOPHEN 500 MG
1000 TABLET ORAL ONCE
Refills: 0 | Status: COMPLETED | OUTPATIENT
Start: 2019-09-01 | End: 2019-09-01

## 2019-09-01 RX ORDER — POTASSIUM PHOSPHATE, MONOBASIC POTASSIUM PHOSPHATE, DIBASIC 236; 224 MG/ML; MG/ML
15 INJECTION, SOLUTION INTRAVENOUS ONCE
Refills: 0 | Status: COMPLETED | OUTPATIENT
Start: 2019-09-01 | End: 2019-09-01

## 2019-09-01 RX ORDER — POTASSIUM CHLORIDE 20 MEQ
10 PACKET (EA) ORAL
Refills: 0 | Status: COMPLETED | OUTPATIENT
Start: 2019-09-01 | End: 2019-09-01

## 2019-09-01 RX ORDER — CHLORHEXIDINE GLUCONATE 213 G/1000ML
1 SOLUTION TOPICAL DAILY
Refills: 0 | Status: DISCONTINUED | OUTPATIENT
Start: 2019-09-01 | End: 2019-09-09

## 2019-09-01 RX ADMIN — PANTOPRAZOLE SODIUM 40 MILLIGRAM(S): 20 TABLET, DELAYED RELEASE ORAL at 17:55

## 2019-09-01 RX ADMIN — Medication 100 MILLIEQUIVALENT(S): at 00:42

## 2019-09-01 RX ADMIN — Medication 1: at 08:13

## 2019-09-01 RX ADMIN — DEXTROSE MONOHYDRATE, SODIUM CHLORIDE, AND POTASSIUM CHLORIDE 100 MILLILITER(S): 50; .745; 4.5 INJECTION, SOLUTION INTRAVENOUS at 15:32

## 2019-09-01 RX ADMIN — SODIUM CHLORIDE 150 MILLILITER(S): 9 INJECTION, SOLUTION INTRAVENOUS at 01:53

## 2019-09-01 RX ADMIN — Medication 1000 MILLIGRAM(S): at 14:13

## 2019-09-01 RX ADMIN — ENOXAPARIN SODIUM 40 MILLIGRAM(S): 100 INJECTION SUBCUTANEOUS at 17:55

## 2019-09-01 RX ADMIN — Medication 100 MILLIEQUIVALENT(S): at 10:31

## 2019-09-01 RX ADMIN — Medication 100 MILLIEQUIVALENT(S): at 01:47

## 2019-09-01 RX ADMIN — POTASSIUM PHOSPHATE, MONOBASIC POTASSIUM PHOSPHATE, DIBASIC 62.5 MILLIMOLE(S): 236; 224 INJECTION, SOLUTION INTRAVENOUS at 17:54

## 2019-09-01 RX ADMIN — Medication 100 MILLIEQUIVALENT(S): at 15:42

## 2019-09-01 RX ADMIN — Medication 100 MILLIEQUIVALENT(S): at 09:15

## 2019-09-01 RX ADMIN — Medication 400 MILLIGRAM(S): at 13:35

## 2019-09-01 NOTE — DIETITIAN INITIAL EVALUATION ADULT. - ADD RECOMMEND
1). Advance diet as tolerated and medically feasible. 2). Consider Ensure Clear 2x daily (360 meena and 16 gm protein) once diet is advanced. Monitor weights, labs, BM's, skin integrity, edema and PO tolerance once diet is advanced. Follow up as per protocol.

## 2019-09-01 NOTE — CONSULT NOTE ADULT - ATTENDING COMMENTS
Patient seen and examined.   CT images reviewed.   Limited bedside exam performed.   Obese elderly female admitted with SBO from incarcerated ventral hernia.   Incidentally noted to have a large pelvic mass.   Overall imaging features most suggestive of a benign process, however can not rule out possibility of malignancy.   I have recommended a BSO poss hysterectomy at time of ventral hernia repair.   Surgery was described in detail and its risks and benefits reviewed.   Surgical management of ovarian malignancy described in detail.   Patient expressed understanding of information reviewed and is amenable to proceeding.   Recommendations discussed with surgical team.   Needs dental evaluation and surgery is anticipated for later this week.

## 2019-09-01 NOTE — PROGRESS NOTE ADULT - SUBJECTIVE AND OBJECTIVE BOX
Interval Events:    Some concern for worsening abdominal exam yesterday evening. Labs sent with lactate 1.1, hypokalemia, otherwise WNL. K replaced x1. . Vitals remained stable    S: Patient reports this AM feeling crampy gas-like pain. Denies passing flatus or having BM. Reports abdominal pain that comes and goes. No n/v.     O:     Vital Signs Last 24 Hrs  T(C): 36.7 (01 Sep 2019 06:37), Max: 36.9 (31 Aug 2019 17:39)  T(F): 98 (01 Sep 2019 06:37), Max: 98.4 (31 Aug 2019 17:39)  HR: 94 (01 Sep 2019 06:37) (87 - 94)  BP: 149/70 (01 Sep 2019 06:37) (132/66 - 151/72)  BP(mean): --  RR: 18 (01 Sep 2019 06:37) (16 - 20)  SpO2: 95% (01 Sep 2019 06:37) (91% - 97%)    I&O's Detail    31 Aug 2019 07:01  -  01 Sep 2019 07:00  --------------------------------------------------------  IN:    dextrose 5% + sodium chloride 0.9%.: 1800 mL  Total IN: 1800 mL    OUT:    Nasoenteral Tube: 800 mL    Voided: 500 mL  Total OUT: 1300 mL    Total NET: 500 mL          Physical Exam:    Gen: Well-developed, well-nourished in no acute distress  Resp: regular nonlabored respirations  CV: Regular rate and rhythm, extremities WWP  GI: Soft, mildly tender to palpation in lower quadrants bilaterally, significantly distended.  No masses.  MSK: Moves all extremities equally  Skin: No rashes    Labs:  09-01    144  |  103  |  8   ----------------------------<  178<H>  3.4<L>   |  32<H>  |  0.49<L>    Ca    8.2<L>      01 Sep 2019 04:30  Phos  2.6     09-01  Mg     1.9     09-01    TPro  8.1  /  Alb  3.8  /  TBili  0.5  /  DBili  x   /  AST  19  /  ALT  14  /  AlkPhos  149<H>  08-30                            8.9    9.62  )-----------( 231      ( 01 Sep 2019 04:30 )             31.3       MEDICATIONS  (STANDING):  atorvastatin 20 milliGRAM(s) Oral at bedtime  dextrose 50% Injectable 12.5 Gram(s) IV Push once  dextrose 50% Injectable 25 Gram(s) IV Push once  dextrose 50% Injectable 25 Gram(s) IV Push once  enoxaparin Injectable 40 milliGRAM(s) SubCutaneous every 12 hours  furosemide    Tablet 20 milliGRAM(s) Oral daily  insulin lispro (HumaLOG) corrective regimen sliding scale   SubCutaneous three times a day before meals  losartan 100 milliGRAM(s) Oral daily    MEDICATIONS  (PRN):  acetaminophen   Tablet .. 975 milliGRAM(s) Oral every 6 hours PRN Mild Pain (1 - 3), Moderate Pain (4 - 6), Severe Pain (7 - 10)  dextrose 40% Gel 15 Gram(s) Oral once PRN Blood Glucose LESS THAN 70 milliGRAM(s)/deciliter  glucagon  Injectable 1 milliGRAM(s) IntraMuscular once PRN Glucose LESS THAN 70 milligrams/deciliter  polyethylene glycol 3350 17 Gram(s) Oral daily PRN Constipation

## 2019-09-01 NOTE — CONSULT NOTE ADULT - SUBJECTIVE AND OBJECTIVE BOX
CAS CASSIDY  68y  Female [id number]    HPI:  69yo F with MHx of morbid obesity (BMI 50), DM2, HTN, and known ventral hernia presented 8/30 from rehab after previous admission on 8/10 for SBO (d/c'd on 8/16) with c/c of worsening intermittent abd pain x3 days associated with nausea, multiple episodes of NBNB vomiting, and PO intolerance. SBO on 8/10 resolved resolved with nonoperative mngt and she was discharged to rehab. In rehab patient's last flatus was on 8/27 and last BM on 8/28 after an enema. No BM or flatus thereafter. Xray was taken at rehab and patient was transferred to [facility] ER for concern of SBO. In the ED, CT showed SBO with transition point in the known ventral hernia similar to prior admission and redemonstration of ~18cm left adnexal mass. An NGT was subsequently placed. Hernia not repaired on previous admission due to body habitus and adnexal mass. Patient denies dizziness, CP, SOB, or dysuria. (30 Aug 2019 21:55)  Pt reports prior admission was the first time she was told of pelvic mass. Reports last GYN exam was about 1 year ago, pap smear was WNL. Denies hx of abnormal pap smears in the past. Denies hx of ovarian cysts. Pt experienced menopause around age 48, denies any hx of post-menopausal bleeding, currently not having any vaginal bleeding or discharge. Denies pelvic pain.  On prior admission pt was seen by GYN team and private attending Dr. Singh. Tumor markers were obtained,  mildly elevated at 107, CEA and  WNL. Plan made for pt to f/u with Dr. Singh outpatient and plan for concurrent cystectomy with general surgery procedure. Pt was unable to f/u in the office as she was discharged to rehab and subsequently readmitted with worsening symptoms 2/2 to SBO. Per surgery team, plan for OR Tuesday 9/3. GYN consulted for potential concurrent cystectomy.         Name of GYN Physician: Dr. Singh    OBHx: G0    HCM: last pap 1 year ago, last mammo 1.5 years ago, never had a colonoscopy        Hospital Meds:   MEDICATIONS  (STANDING):  dextrose 5% + sodium chloride 0.9%. 1000 milliLiter(s) (150 mL/Hr) IV Continuous <Continuous>  dextrose 5%. 1000 milliLiter(s) (50 mL/Hr) IV Continuous <Continuous>  dextrose 50% Injectable 12.5 Gram(s) IV Push once  dextrose 50% Injectable 25 Gram(s) IV Push once  dextrose 50% Injectable 25 Gram(s) IV Push once  enoxaparin Injectable 40 milliGRAM(s) SubCutaneous daily  insulin lispro (HumaLOG) corrective regimen sliding scale   SubCutaneous every 6 hours  pantoprazole  Injectable 40 milliGRAM(s) IV Push daily  potassium chloride  10 mEq/100 mL IVPB 10 milliEquivalent(s) IV Intermittent every 1 hour  potassium phosphate IVPB 15 milliMole(s) IV Intermittent once    MEDICATIONS  (PRN):  dextrose 40% Gel 15 Gram(s) Oral once PRN Blood Glucose LESS THAN 70 milliGRAM(s)/deciliter  enalaprilat Injectable 1.25 milliGRAM(s) IV Push every 6 hours PRN hypertension  glucagon  Injectable 1 milliGRAM(s) IntraMuscular once PRN Glucose LESS THAN 70 milligrams/deciliter      Allergies    No Known Allergies    Intolerances        PAST MEDICAL & SURGICAL HISTORY:  SBO (small bowel obstruction)  CAD (coronary artery disease)  DM (diabetes mellitus)  HTN (hypertension)  No significant past surgical history      FamHx: grandma with breast CA, sister with breast CA diagnosed in her 20s, patient unsure if sister ever had genetic testing, denies additional family with breast, uterine, ovarian, colon cancers.    Soc: lives at home with , limited mobility 2/2 morbid obesity, uses wheelchair with walker intermittently has home health aid      Vital Signs Last 24 Hrs  T(C): 37.2 (01 Sep 2019 10:30), Max: 37.2 (01 Sep 2019 10:30)  T(F): 98.9 (01 Sep 2019 10:30), Max: 98.9 (01 Sep 2019 10:30)  HR: 89 (01 Sep 2019 10:30) (87 - 94)  BP: 159/72 (01 Sep 2019 10:30) (132/66 - 159/72)  BP(mean): --  RR: 18 (01 Sep 2019 10:30) (18 - 20)  SpO2: 98% (01 Sep 2019 10:30) (91% - 98%)    Physical Exam:   General: sitting comftorably in bed, NAD   HEENT: neck supple, full ROM  CV: RR S1S2 no m/r/g  Lungs: CTA b/l, good air flow b/l   Back: No CVA tenderness  Abd: Soft, non-tender, non-distended.  Extremely limited due to body habitus    :  Deferred   Ext: non-tender b/l, no edema     LABS:                              8.9    9.62  )-----------( 231      ( 01 Sep 2019 04:30 )             31.3     09-01    144  |  103  |  8   ----------------------------<  178<H>  3.4<L>   |  32<H>  |  0.49<L>    Ca    8.2<L>      01 Sep 2019 04:30  Phos  2.6     09-01  Mg     1.9     09-01    TPro  8.1  /  Alb  3.8  /  TBili  0.5  /  DBili  x   /  AST  19  /  ALT  14  /  AlkPhos  149<H>  08-30    I&O's Detail    31 Aug 2019 07:01  -  01 Sep 2019 07:00  --------------------------------------------------------  IN:    dextrose 5% + sodium chloride 0.9%.: 1800 mL  Total IN: 1800 mL    OUT:    Nasoenteral Tube: 800 mL    Voided: 500 mL  Total OUT: 1300 mL    Total NET: 500 mL        PT/INR - ( 30 Aug 2019 17:38 )   PT: 12.8 SEC;   INR: 1.12          PTT - ( 30 Aug 2019 17:38 )  PTT:25.9 SEC      RADIOLOGY & ADDITIONAL STUDIES: CAS CASSIDY  68y  Female [id number]    HPI:  69yo F with MHx of morbid obesity (BMI 50), DM2, HTN, and known ventral hernia presented 8/30 from rehab after previous admission on 8/10 for SBO (d/c'd on 8/16) with c/c of worsening intermittent abd pain x3 days associated with nausea, multiple episodes of NBNB vomiting, and PO intolerance. SBO on 8/10 resolved resolved with nonoperative mngt and she was discharged to rehab. In rehab patient's last flatus was on 8/27 and last BM on 8/28 after an enema. No BM or flatus thereafter. Xray was taken at rehab and patient was transferred to [facility] ER for concern of SBO. In the ED, CT showed SBO with transition point in the known ventral hernia similar to prior admission and redemonstration of ~18cm left adnexal mass. An NGT was subsequently placed. Hernia not repaired on previous admission due to body habitus and adnexal mass. Patient denies dizziness, CP, SOB, or dysuria. (30 Aug 2019 21:55)  Pt reports prior admission was the first time she was told of pelvic mass. Reports last GYN exam was about 1 year ago, pap smear was WNL. Denies hx of abnormal pap smears in the past. Denies hx of ovarian cysts. Pt experienced menopause around age 48, denies any hx of post-menopausal bleeding, currently not having any vaginal bleeding or discharge. Denies pelvic pain.  On prior admission pt was seen by GYN team and private attending Dr. Singh. Tumor markers were obtained,  mildly elevated at 107, CEA and  WNL. Plan made for pt to f/u with Dr. Singh outpatient and plan for concurrent cystectomy with general surgery procedure. Pt was unable to f/u in the office as she was discharged to rehab and subsequently readmitted with worsening symptoms 2/2 to SBO. Per surgery team, plan for OR Tuesday 9/3. GYN consulted for potential concurrent cystectomy.         Name of GYN Physician: Dr. Singh    OBHx: G0    HCM: last pap 1 year ago, last mammo 1.5 years ago, never had a colonoscopy        Hospital Meds:   MEDICATIONS  (STANDING):  dextrose 5% + sodium chloride 0.9%. 1000 milliLiter(s) (150 mL/Hr) IV Continuous <Continuous>  dextrose 5%. 1000 milliLiter(s) (50 mL/Hr) IV Continuous <Continuous>  dextrose 50% Injectable 12.5 Gram(s) IV Push once  dextrose 50% Injectable 25 Gram(s) IV Push once  dextrose 50% Injectable 25 Gram(s) IV Push once  enoxaparin Injectable 40 milliGRAM(s) SubCutaneous daily  insulin lispro (HumaLOG) corrective regimen sliding scale   SubCutaneous every 6 hours  pantoprazole  Injectable 40 milliGRAM(s) IV Push daily  potassium chloride  10 mEq/100 mL IVPB 10 milliEquivalent(s) IV Intermittent every 1 hour  potassium phosphate IVPB 15 milliMole(s) IV Intermittent once    MEDICATIONS  (PRN):  dextrose 40% Gel 15 Gram(s) Oral once PRN Blood Glucose LESS THAN 70 milliGRAM(s)/deciliter  enalaprilat Injectable 1.25 milliGRAM(s) IV Push every 6 hours PRN hypertension  glucagon  Injectable 1 milliGRAM(s) IntraMuscular once PRN Glucose LESS THAN 70 milligrams/deciliter      Allergies    No Known Allergies    Intolerances        PAST MEDICAL & SURGICAL HISTORY:  SBO (small bowel obstruction)  CAD (coronary artery disease)  DM (diabetes mellitus)  HTN (hypertension)  No significant past surgical history      FamHx: grandma with breast CA, sister with breast CA diagnosed in her 20s, patient unsure if sister ever had genetic testing, denies additional family with breast, uterine, ovarian, colon cancers.    Soc: lives at home with , limited mobility 2/2 morbid obesity, uses wheelchair with walker intermittently has home health aid      Vital Signs Last 24 Hrs  T(C): 37.2 (01 Sep 2019 10:30), Max: 37.2 (01 Sep 2019 10:30)  T(F): 98.9 (01 Sep 2019 10:30), Max: 98.9 (01 Sep 2019 10:30)  HR: 89 (01 Sep 2019 10:30) (87 - 94)  BP: 159/72 (01 Sep 2019 10:30) (132/66 - 159/72)  BP(mean): --  RR: 18 (01 Sep 2019 10:30) (18 - 20)  SpO2: 98% (01 Sep 2019 10:30) (91% - 98%)    Physical Exam:   General: sitting comftorably in bed, NAD   HEENT: neck supple, full ROM  CV: RR S1S2 no m/r/g  Lungs: CTA b/l, good air flow b/l   Back: No CVA tenderness  Abd: Soft, mild tenderness, obese.  Extremely limited due to body habitus    :  Deferred   Ext: non-tender b/l, no edema     LABS:                              8.9    9.62  )-----------( 231      ( 01 Sep 2019 04:30 )             31.3     09-01    144  |  103  |  8   ----------------------------<  178<H>  3.4<L>   |  32<H>  |  0.49<L>    Ca    8.2<L>      01 Sep 2019 04:30  Phos  2.6     09-01  Mg     1.9     09-01    TPro  8.1  /  Alb  3.8  /  TBili  0.5  /  DBili  x   /  AST  19  /  ALT  14  /  AlkPhos  149<H>  08-30    I&O's Detail    31 Aug 2019 07:01  -  01 Sep 2019 07:00  --------------------------------------------------------  IN:    dextrose 5% + sodium chloride 0.9%.: 1800 mL  Total IN: 1800 mL    OUT:    Nasoenteral Tube: 800 mL    Voided: 500 mL  Total OUT: 1300 mL    Total NET: 500 mL        PT/INR - ( 30 Aug 2019 17:38 )   PT: 12.8 SEC;   INR: 1.12          PTT - ( 30 Aug 2019 17:38 )  PTT:25.9 SEC      RADIOLOGY & ADDITIONAL STUDIES:

## 2019-09-01 NOTE — DIETITIAN INITIAL EVALUATION ADULT. - PERTINENT LABORATORY DATA
09-01 Na 144 mmol/L Glu 178 mg/dL<H> K+ 3.4 mmol/L<L> Cr 0.49 mg/dL<L> BUN 8 mg/dL Phos 2.6 mg/dL  08-11-19 HbA1c 5.2 %  09-01 @ 08:04 POCT 185 mg/dL  08-31 @ 21:55 POCT 149 mg/dL  08-31 @ 18:03 POCT 131 mg/dL  08-31 @ 12:50 POCT 113 mg/dL

## 2019-09-01 NOTE — CONSULT NOTE ADULT - PROBLEM SELECTOR RECOMMENDATION 9
-Spoke with private GYN Dr. Singh regarding cystectomy, pt to be seen by GYN Oncology for potential resection of adnexal mass.   -Final recommendations pending GYN Oncology consult    Pt seen with Dr. Metcalf, PGY-4  D/w Dr. Singh and Dr. Ahn -Spoke with private GYN Dr. Singh regarding cystectomy, pt to be seen by GYN Oncology for potential resection of adnexal mass.   -Final recommendations pending evaluation by gyn oncology attending    Pt seen with Dr. Metcalf, PGY-4  D/w Dr. Singh and Dr. Ahn

## 2019-09-01 NOTE — DIETITIAN INITIAL EVALUATION ADULT. - OTHER INFO
69 y/o female admitted with dx of abdominal obstruction. Pt NPO with NGT for LCWS with dark red/ brown output noted as per Flowsheet. Nutrition consult ordered for enteral nutrition PTA. Visited pt for nutrition hx - she denies having been on enteral feeding while recently at rehab. Did endorse significant abdominal pain with N/V and PO intolerance/loss of appetite for 3 days PTA. Pt now NPO since admission. An adexal mass was found on admission work up. Plan for surgery on 9/3; GYN following. Pt denies food allergies, nausea/vomiting/diarrhea/constipation, or issues with chewing/swallowing at this time. States N/V have stopped but her appetite is still poor. Pt had a recent J admission 8/9 with admit wt of 142.4 kg. Wt this admission 136.00 kg. Pt had a 4.5% wt loss over 3 weeks PTA. Pt also noted with 1+ generalized edema and 2+L/R ankle and L/R feet edema. Pt meets criteria for severe malnutrition risk 2/2 wt loss and inadequate PO intake PTA, coupled with 2+ extremity edema plus extended NPO status.

## 2019-09-01 NOTE — CHART NOTE - NSCHARTNOTEFT_GEN_A_CORE
NUTRITION SERVICES     Upon Nutritional Assessment by the Registered Dietitian your patient was determined to meet criteria/ has evidence of the following diagnosis/diagnoses:  [ ] Mild Protein Calorie Malnutrition   [ ] Moderate Protein Calorie Malnutrition   [ x] Severe Protein Calorie Malnutrition     Findings as based on:  •  Comprehensive nutritional assessment and consultation    Etiology: in the context of acute illness.     Signs/Symptoms: 4.5% wt loss over 3 wks PTA; <50% nutrition needs met >5days; 2+ extremity edema.       Please refer to Initial Dietitian Evaluation via documents section of [employer] for further recommendations.

## 2019-09-01 NOTE — DIETITIAN INITIAL EVALUATION ADULT. - PERTINENT MEDS FT
MEDICATIONS  (STANDING):  acetaminophen  IVPB .. 1000 milliGRAM(s) IV Intermittent once  chlorhexidine 2% Cloths 1 Application(s) Topical daily  dextrose 5% + sodium chloride 0.45% with potassium chloride 20 mEq/L 1000 milliLiter(s) (100 mL/Hr) IV Continuous <Continuous>  dextrose 5%. 1000 milliLiter(s) (50 mL/Hr) IV Continuous <Continuous>  dextrose 50% Injectable 12.5 Gram(s) IV Push once  dextrose 50% Injectable 25 Gram(s) IV Push once  dextrose 50% Injectable 25 Gram(s) IV Push once  enoxaparin Injectable 40 milliGRAM(s) SubCutaneous daily  insulin lispro (HumaLOG) corrective regimen sliding scale   SubCutaneous every 6 hours  pantoprazole  Injectable 40 milliGRAM(s) IV Push daily  potassium chloride  10 mEq/100 mL IVPB 10 milliEquivalent(s) IV Intermittent every 1 hour  potassium phosphate IVPB 15 milliMole(s) IV Intermittent once

## 2019-09-01 NOTE — CONSULT NOTE ADULT - ASSESSMENT
69yo F with MHx of morbid obesity (BMI 50), DM2, HTN, and known ventral hernia presented 8/30 from rehab after previous admission on 8/10 for SBO with worsening symptoms, general surgery planning for OR on 9/3. Pt incidentally found to have 18cm right adnexal mass on imaging during previous admission, tumor markers were significant for mildly elevated CA-125 to 107. GYN consulted for potential concurrent cystectomy. 67yo F with MHx of morbid obesity (BMI 50), DM2, HTN, and known ventral hernia presented 8/30 from rehab after previous admission on 8/10 for SBO with worsening symptoms, general surgery planning for OR on 9/3. Pt incidentally found to have 18cm right adnexal mass on imaging during previous admission, tumor markers were significant for mildly elevated CA-125 to 107. GYN consulted for potential concurrent gyn surgery for management of adnexal mass. Case presented to gyn oncology due to concern for malignancy with elevated .

## 2019-09-01 NOTE — PROGRESS NOTE ADULT - ASSESSMENT
Assessment:  Pt is a 68F presenting with epigastric abdominal pain, with CT showing ventral hernia with small bowel and fluid in hernia sac and a large ovarian cyst 10cm with calcification. Doing well with return of bowel function. Plan for potential OR Tuesday in conjunction with GYN for removal of ovarian cyst and ventral hernia repair.     Plan:  - GYN aware, will follow up with plan  - NPO/NGT  - Continue to monitor GI function  - replete lytes PRN  - Will obtain echo, ekg, for medical clearance for OR. Look into records for OP stress test.   - PT: rehab facility, case management aware and patient amenable pending operative plan    Dispo pending authorization    B Team Surgery  [id number]

## 2019-09-02 LAB
ANION GAP SERPL CALC-SCNC: 8 MMO/L — SIGNIFICANT CHANGE UP (ref 7–14)
BUN SERPL-MCNC: 7 MG/DL — SIGNIFICANT CHANGE UP (ref 7–23)
CALCIUM SERPL-MCNC: 8.4 MG/DL — SIGNIFICANT CHANGE UP (ref 8.4–10.5)
CHLORIDE SERPL-SCNC: 106 MMOL/L — SIGNIFICANT CHANGE UP (ref 98–107)
CO2 SERPL-SCNC: 32 MMOL/L — HIGH (ref 22–31)
CREAT SERPL-MCNC: 0.5 MG/DL — SIGNIFICANT CHANGE UP (ref 0.5–1.3)
GLUCOSE BLDC GLUCOMTR-MCNC: 135 MG/DL — HIGH (ref 70–99)
GLUCOSE BLDC GLUCOMTR-MCNC: 138 MG/DL — HIGH (ref 70–99)
GLUCOSE BLDC GLUCOMTR-MCNC: 140 MG/DL — HIGH (ref 70–99)
GLUCOSE BLDC GLUCOMTR-MCNC: 147 MG/DL — HIGH (ref 70–99)
GLUCOSE SERPL-MCNC: 133 MG/DL — HIGH (ref 70–99)
HCT VFR BLD CALC: 31.4 % — LOW (ref 34.5–45)
HGB BLD-MCNC: 9 G/DL — LOW (ref 11.5–15.5)
MAGNESIUM SERPL-MCNC: 1.8 MG/DL — SIGNIFICANT CHANGE UP (ref 1.6–2.6)
MCHC RBC-ENTMCNC: 23.9 PG — LOW (ref 27–34)
MCHC RBC-ENTMCNC: 28.7 % — LOW (ref 32–36)
MCV RBC AUTO: 83.3 FL — SIGNIFICANT CHANGE UP (ref 80–100)
NRBC # FLD: 0.03 K/UL — SIGNIFICANT CHANGE UP (ref 0–0)
PHOSPHATE SERPL-MCNC: 2.9 MG/DL — SIGNIFICANT CHANGE UP (ref 2.5–4.5)
PLATELET # BLD AUTO: 251 K/UL — SIGNIFICANT CHANGE UP (ref 150–400)
PMV BLD: 10.1 FL — SIGNIFICANT CHANGE UP (ref 7–13)
POTASSIUM SERPL-MCNC: 3.7 MMOL/L — SIGNIFICANT CHANGE UP (ref 3.5–5.3)
POTASSIUM SERPL-MCNC: 3.8 MMOL/L — SIGNIFICANT CHANGE UP (ref 3.5–5.3)
POTASSIUM SERPL-SCNC: 3.7 MMOL/L — SIGNIFICANT CHANGE UP (ref 3.5–5.3)
POTASSIUM SERPL-SCNC: 3.8 MMOL/L — SIGNIFICANT CHANGE UP (ref 3.5–5.3)
RBC # BLD: 3.77 M/UL — LOW (ref 3.8–5.2)
RBC # FLD: 15.7 % — HIGH (ref 10.3–14.5)
SODIUM SERPL-SCNC: 146 MMOL/L — HIGH (ref 135–145)
WBC # BLD: 11.36 K/UL — HIGH (ref 3.8–10.5)
WBC # FLD AUTO: 11.36 K/UL — HIGH (ref 3.8–10.5)

## 2019-09-02 PROCEDURE — 99232 SBSQ HOSP IP/OBS MODERATE 35: CPT | Mod: GC

## 2019-09-02 PROCEDURE — 99223 1ST HOSP IP/OBS HIGH 75: CPT

## 2019-09-02 RX ORDER — ENOXAPARIN SODIUM 100 MG/ML
30 INJECTION SUBCUTANEOUS
Refills: 0 | Status: DISCONTINUED | OUTPATIENT
Start: 2019-09-02 | End: 2019-09-09

## 2019-09-02 RX ORDER — MAGNESIUM SULFATE 500 MG/ML
2 VIAL (ML) INJECTION ONCE
Refills: 0 | Status: COMPLETED | OUTPATIENT
Start: 2019-09-02 | End: 2019-09-02

## 2019-09-02 RX ORDER — ACETAMINOPHEN 500 MG
1000 TABLET ORAL ONCE
Refills: 0 | Status: COMPLETED | OUTPATIENT
Start: 2019-09-02 | End: 2019-09-02

## 2019-09-02 RX ORDER — BENZOCAINE AND MENTHOL 5; 1 G/100ML; G/100ML
1 LIQUID ORAL THREE TIMES A DAY
Refills: 0 | Status: DISCONTINUED | OUTPATIENT
Start: 2019-09-02 | End: 2019-09-09

## 2019-09-02 RX ORDER — ACETAMINOPHEN 500 MG
1000 TABLET ORAL EVERY 6 HOURS
Refills: 0 | Status: COMPLETED | OUTPATIENT
Start: 2019-09-02 | End: 2019-09-03

## 2019-09-02 RX ORDER — NYSTATIN CREAM 100000 [USP'U]/G
1 CREAM TOPICAL EVERY 12 HOURS
Refills: 0 | Status: DISCONTINUED | OUTPATIENT
Start: 2019-09-02 | End: 2019-09-09

## 2019-09-02 RX ADMIN — Medication 50 GRAM(S): at 09:29

## 2019-09-02 RX ADMIN — Medication 400 MILLIGRAM(S): at 03:51

## 2019-09-02 RX ADMIN — BENZOCAINE AND MENTHOL 1 LOZENGE: 5; 1 LIQUID ORAL at 05:50

## 2019-09-02 RX ADMIN — BENZOCAINE AND MENTHOL 1 LOZENGE: 5; 1 LIQUID ORAL at 18:29

## 2019-09-02 RX ADMIN — CHLORHEXIDINE GLUCONATE 1 APPLICATION(S): 213 SOLUTION TOPICAL at 12:28

## 2019-09-02 RX ADMIN — Medication 400 MILLIGRAM(S): at 15:24

## 2019-09-02 RX ADMIN — Medication 1000 MILLIGRAM(S): at 04:25

## 2019-09-02 RX ADMIN — Medication 1000 MILLIGRAM(S): at 16:00

## 2019-09-02 RX ADMIN — PANTOPRAZOLE SODIUM 40 MILLIGRAM(S): 20 TABLET, DELAYED RELEASE ORAL at 15:38

## 2019-09-02 RX ADMIN — DEXTROSE MONOHYDRATE, SODIUM CHLORIDE, AND POTASSIUM CHLORIDE 100 MILLILITER(S): 50; .745; 4.5 INJECTION, SOLUTION INTRAVENOUS at 21:05

## 2019-09-02 RX ADMIN — ENOXAPARIN SODIUM 30 MILLIGRAM(S): 100 INJECTION SUBCUTANEOUS at 18:29

## 2019-09-02 NOTE — PROGRESS NOTE ADULT - ASSESSMENT
Assessment:  Pt is a 68F presenting with epigastric abdominal pain, with CT showing ventral hernia with small bowel and fluid in hernia sac and a large ovarian cyst 10cm with calcification. Doing well with return of bowel function. Plan for potential OR Tuesday in conjunction with GYN for removal of ovarian cyst and ventral hernia repair.     Plan:  - OR book for 9/4 with gyn onc  - GYN aware, will follow up with plan - 9/2 Dr. Ahn and team saw patient and said that a gyn onc surgeon will be available Wednesday for OR   - NPO/NGT  - Continue to monitor GI function  - replete lytes PRN  - Will obtain echo, ekg, for medical clearance for OR. Look into records for OP stress test.   - PT: rehab facility, case management aware and patient amenable pending operative plan  -change dvt ppx to BID LVX 30mg sq    Dispo pending authorization    B Team Surgery  [id number] Assessment:  Pt is a 68F presenting with epigastric abdominal pain, with CT showing ventral hernia with small bowel and fluid in hernia sac and a large ovarian cyst 10cm with calcification. Doing well with return of bowel function. Plan for potential OR Tuesday in conjunction with GYN for removal of ovarian cyst and ventral hernia repair.     Plan:  - dental re: loose teeth - no contraindications to local anesthetic and no need for prophylactic antibiotics (no history of valvular surgery), plan for exam in dental office 9/3 AM with dental team  - OR book for 9/4 with gyn onc  - GYN aware, will follow up with plan - 9/2 Dr. Ahn and team saw patient and said that a gyn onc surgeon will be available Wednesday for OR   - NPO/NGT  - Continue to monitor GI function  - replete lytes PRN  - Will obtain echo, ekg, for medical clearance for OR. Look into records for OP stress test.   - PT: rehab facility, case management aware and patient amenable pending operative plan  -change dvt ppx to BID LVX 30mg sq    Dispo pending authorization    B Team Surgery  [id number]

## 2019-09-03 LAB
AMORPH PHOS CRY # URNS: SIGNIFICANT CHANGE UP
ANION GAP SERPL CALC-SCNC: 9 MMO/L — SIGNIFICANT CHANGE UP (ref 7–14)
APPEARANCE UR: SIGNIFICANT CHANGE UP
APTT BLD: 25.4 SEC — LOW (ref 27.5–36.3)
BACTERIA # UR AUTO: SIGNIFICANT CHANGE UP
BILIRUB UR-MCNC: NEGATIVE — SIGNIFICANT CHANGE UP
BLD GP AB SCN SERPL QL: NEGATIVE — SIGNIFICANT CHANGE UP
BLOOD UR QL VISUAL: SIGNIFICANT CHANGE UP
BUN SERPL-MCNC: 6 MG/DL — LOW (ref 7–23)
CALCIUM SERPL-MCNC: 8.6 MG/DL — SIGNIFICANT CHANGE UP (ref 8.4–10.5)
CHLORIDE SERPL-SCNC: 106 MMOL/L — SIGNIFICANT CHANGE UP (ref 98–107)
CO2 SERPL-SCNC: 29 MMOL/L — SIGNIFICANT CHANGE UP (ref 22–31)
COLOR SPEC: YELLOW — SIGNIFICANT CHANGE UP
CREAT SERPL-MCNC: 0.48 MG/DL — LOW (ref 0.5–1.3)
GLUCOSE BLDC GLUCOMTR-MCNC: 123 MG/DL — HIGH (ref 70–99)
GLUCOSE BLDC GLUCOMTR-MCNC: 126 MG/DL — HIGH (ref 70–99)
GLUCOSE BLDC GLUCOMTR-MCNC: 126 MG/DL — HIGH (ref 70–99)
GLUCOSE BLDC GLUCOMTR-MCNC: 141 MG/DL — HIGH (ref 70–99)
GLUCOSE SERPL-MCNC: 137 MG/DL — HIGH (ref 70–99)
GLUCOSE UR-MCNC: NEGATIVE — SIGNIFICANT CHANGE UP
HCT VFR BLD CALC: 33.1 % — LOW (ref 34.5–45)
HGB BLD-MCNC: 9.5 G/DL — LOW (ref 11.5–15.5)
INR BLD: 1.11 — SIGNIFICANT CHANGE UP (ref 0.88–1.17)
KETONES UR-MCNC: NEGATIVE — SIGNIFICANT CHANGE UP
LEUKOCYTE ESTERASE UR-ACNC: NEGATIVE — SIGNIFICANT CHANGE UP
MAGNESIUM SERPL-MCNC: 2.1 MG/DL — SIGNIFICANT CHANGE UP (ref 1.6–2.6)
MCHC RBC-ENTMCNC: 23.8 PG — LOW (ref 27–34)
MCHC RBC-ENTMCNC: 28.7 % — LOW (ref 32–36)
MCV RBC AUTO: 82.8 FL — SIGNIFICANT CHANGE UP (ref 80–100)
NITRITE UR-MCNC: NEGATIVE — SIGNIFICANT CHANGE UP
NRBC # FLD: 0.02 K/UL — SIGNIFICANT CHANGE UP (ref 0–0)
PH UR: 8.5 — HIGH (ref 5–8)
PHOSPHATE SERPL-MCNC: 2.6 MG/DL — SIGNIFICANT CHANGE UP (ref 2.5–4.5)
PLATELET # BLD AUTO: 236 K/UL — SIGNIFICANT CHANGE UP (ref 150–400)
PMV BLD: 11.1 FL — SIGNIFICANT CHANGE UP (ref 7–13)
POTASSIUM SERPL-MCNC: 4 MMOL/L — SIGNIFICANT CHANGE UP (ref 3.5–5.3)
POTASSIUM SERPL-SCNC: 4 MMOL/L — SIGNIFICANT CHANGE UP (ref 3.5–5.3)
PROT UR-MCNC: 50 — SIGNIFICANT CHANGE UP
PROTHROM AB SERPL-ACNC: 12.4 SEC — SIGNIFICANT CHANGE UP (ref 9.8–13.1)
RBC # BLD: 4 M/UL — SIGNIFICANT CHANGE UP (ref 3.8–5.2)
RBC # FLD: 15.9 % — HIGH (ref 10.3–14.5)
RBC CASTS # UR COMP ASSIST: SIGNIFICANT CHANGE UP (ref 0–?)
RH IG SCN BLD-IMP: NEGATIVE — SIGNIFICANT CHANGE UP
SODIUM SERPL-SCNC: 144 MMOL/L — SIGNIFICANT CHANGE UP (ref 135–145)
SP GR SPEC: 1.03 — SIGNIFICANT CHANGE UP (ref 1–1.04)
UROBILINOGEN FLD QL: 3 — SIGNIFICANT CHANGE UP
WBC # BLD: 11.89 K/UL — HIGH (ref 3.8–10.5)
WBC # FLD AUTO: 11.89 K/UL — HIGH (ref 3.8–10.5)
WBC UR QL: SIGNIFICANT CHANGE UP (ref 0–?)

## 2019-09-03 PROCEDURE — 99233 SBSQ HOSP IP/OBS HIGH 50: CPT | Mod: GC

## 2019-09-03 PROCEDURE — 99232 SBSQ HOSP IP/OBS MODERATE 35: CPT | Mod: GC

## 2019-09-03 PROCEDURE — 93010 ELECTROCARDIOGRAM REPORT: CPT

## 2019-09-03 RX ORDER — POTASSIUM PHOSPHATE, MONOBASIC POTASSIUM PHOSPHATE, DIBASIC 236; 224 MG/ML; MG/ML
15 INJECTION, SOLUTION INTRAVENOUS ONCE
Refills: 0 | Status: COMPLETED | OUTPATIENT
Start: 2019-09-03 | End: 2019-09-03

## 2019-09-03 RX ADMIN — POTASSIUM PHOSPHATE, MONOBASIC POTASSIUM PHOSPHATE, DIBASIC 62.5 MILLIMOLE(S): 236; 224 INJECTION, SOLUTION INTRAVENOUS at 11:40

## 2019-09-03 RX ADMIN — ENOXAPARIN SODIUM 30 MILLIGRAM(S): 100 INJECTION SUBCUTANEOUS at 06:54

## 2019-09-03 RX ADMIN — NYSTATIN CREAM 1 APPLICATION(S): 100000 CREAM TOPICAL at 18:54

## 2019-09-03 RX ADMIN — BENZOCAINE AND MENTHOL 1 LOZENGE: 5; 1 LIQUID ORAL at 00:02

## 2019-09-03 RX ADMIN — BENZOCAINE AND MENTHOL 1 LOZENGE: 5; 1 LIQUID ORAL at 07:55

## 2019-09-03 RX ADMIN — Medication 400 MILLIGRAM(S): at 00:02

## 2019-09-03 RX ADMIN — Medication 1000 MILLIGRAM(S): at 11:40

## 2019-09-03 RX ADMIN — Medication 1000 MILLIGRAM(S): at 00:35

## 2019-09-03 RX ADMIN — ENOXAPARIN SODIUM 30 MILLIGRAM(S): 100 INJECTION SUBCUTANEOUS at 18:54

## 2019-09-03 RX ADMIN — NYSTATIN CREAM 1 APPLICATION(S): 100000 CREAM TOPICAL at 06:54

## 2019-09-03 RX ADMIN — Medication 1000 MILLIGRAM(S): at 21:58

## 2019-09-03 RX ADMIN — PANTOPRAZOLE SODIUM 40 MILLIGRAM(S): 20 TABLET, DELAYED RELEASE ORAL at 11:17

## 2019-09-03 RX ADMIN — Medication 400 MILLIGRAM(S): at 11:03

## 2019-09-03 RX ADMIN — BENZOCAINE AND MENTHOL 1 LOZENGE: 5; 1 LIQUID ORAL at 21:28

## 2019-09-03 RX ADMIN — Medication 400 MILLIGRAM(S): at 21:28

## 2019-09-03 NOTE — PROGRESS NOTE ADULT - SUBJECTIVE AND OBJECTIVE BOX
Patient is a 68y old  Female who presents with a chief complaint of Small Bowel Obstruction (03 Sep 2019 16:36)      Interval History: Pt examined at bedside this afternoon.  Called by surgery team regarding preop testing.  Pt has had echo and EKG since consult on .  Pt denies chest pain, sob, dyspnea at this time.  Per pt, pain in abdomen is controlled.    MEDICATIONS  (STANDING):  benzocaine 15 mG/menthol 3.6 mG Lozenge 1 Lozenge Oral three times a day  chlorhexidine 2% Cloths 1 Application(s) Topical daily  dextrose 5% + sodium chloride 0.45% with potassium chloride 20 mEq/L 1000 milliLiter(s) (100 mL/Hr) IV Continuous <Continuous>  dextrose 5%. 1000 milliLiter(s) (50 mL/Hr) IV Continuous <Continuous>  dextrose 50% Injectable 12.5 Gram(s) IV Push once  dextrose 50% Injectable 25 Gram(s) IV Push once  dextrose 50% Injectable 25 Gram(s) IV Push once  enoxaparin Injectable 30 milliGRAM(s) SubCutaneous two times a day  insulin lispro (HumaLOG) corrective regimen sliding scale   SubCutaneous every 6 hours  nystatin Powder 1 Application(s) Topical every 12 hours  pantoprazole  Injectable 40 milliGRAM(s) IV Push daily    MEDICATIONS  (PRN):  dextrose 40% Gel 15 Gram(s) Oral once PRN Blood Glucose LESS THAN 70 milliGRAM(s)/deciliter  enalaprilat Injectable 1.25 milliGRAM(s) IV Push every 6 hours PRN hypertension  glucagon  Injectable 1 milliGRAM(s) IntraMuscular once PRN Glucose LESS THAN 70 milligrams/deciliter      Objective    Vital Signs Last 24 Hrs  T(C): 36.8 (03 Sep 2019 11:00), Max: 37.1 (02 Sep 2019 22:09)  T(F): 98.3 (03 Sep 2019 11:00), Max: 98.8 (02 Sep 2019 22:09)  HR: 74 (03 Sep 2019 11:00) (74 - 85)  BP: 146/57 (03 Sep 2019 11:00) (139/55 - 152/71)  BP(mean): --  RR: 18 (03 Sep 2019 11:00) (18 - 18)  SpO2: 95% (03 Sep 2019 11:00) (95% - 97%)      CAPILLARY BLOOD GLUCOSE      POCT Blood Glucose.: 123 mg/dL (03 Sep 2019 18:02)  POCT Blood Glucose.: 126 mg/dL (03 Sep 2019 12:36)  POCT Blood Glucose.: 126 mg/dL (03 Sep 2019 07:16)  POCT Blood Glucose.: 140 mg/dL (02 Sep 2019 23:51)        19 @ 07:01  -  19 @ 07:00  --------------------------------------------------------  IN: 0 mL / OUT: 2350 mL / NET: -2350 mL        Appearance: Sitting in bed, NAD  HEENT:  Pt missing teeth in front after dental extraction  Cardiovascular: RRR, No JVD, No murmurs, gallops or rubs appreciated  Respiratory: Lungs clear to auscultation bilaterally, no wheezes, crackles appreciated  Gastrointestinal:  Soft, nondistended, minimally tender	  Skin: No rashes, eccymosis or cyanosis noted	  Neurologic: AOx3, CNII-XII grossly intact, motor and sensory function grossly intact  Extremities: Moving all extremities equally  Vascular: 2+ pitting edema  Psych:  Normal mood and affect, responds to questions appropriately          144  |  106  |  6<L>  ----------------------------<  137<H>  4.0   |  29  |  0.48<L>      146<H>  |  106  |  7   ----------------------------<  133<H>  3.8   |  32<H>  |  0.50      144  |  103  |  8   ----------------------------<  178<H>  3.4<L>   |  32<H>  |  0.49<L>    Ca    8.6      03 Sep 2019 07:15  Ca    8.4      02 Sep 2019 02:59  Ca    8.2<L>      01 Sep 2019 04:30  Phos  2.6       Mg     2.1             PT/INR - ( 03 Sep 2019 07:15 )   PT: 12.4 SEC;   INR: 1.11          PTT - ( 03 Sep 2019 07:15 )  PTT:25.4 SEC              Urinalysis Basic - ( 03 Sep 2019 17:20 )    Color: YELLOW / Appearance: TURBID / S.031 / pH: 8.5  Gluc: NEGATIVE / Ketone: NEGATIVE  / Bili: NEGATIVE / Urobili: 3.0   Blood: MODERATE / Protein: 50 / Nitrite: NEGATIVE   Leuk Esterase: NEGATIVE / RBC: 25-50 / WBC 2-5   Sq Epi: x / Non Sq Epi: x / Bacteria: LARGE                              9.5    11.89 )-----------( 236      ( 03 Sep 2019 07:15 )             33.1                         9.0    11.36 )-----------( 251      ( 02 Sep 2019 02:59 )             31.4                         8.9    9.62  )-----------( 231      ( 01 Sep 2019 04:30 )             31.3     CAPILLARY BLOOD GLUCOSE      POCT Blood Glucose.: 123 mg/dL (03 Sep 2019 18:02)  POCT Blood Glucose.: 126 mg/dL (03 Sep 2019 12:36)  POCT Blood Glucose.: 126 mg/dL (03 Sep 2019 07:16)  POCT Blood Glucose.: 140 mg/dL (02 Sep 2019 23:51)          Radiology & Imaging    Imaging Personally Reviewed:    Consultant Notes Reviewed Patient is a 68y old  Female who presents with a chief complaint of Small Bowel Obstruction (03 Sep 2019 16:36)      Interval History: Pt examined at bedside this afternoon.  Called by surgery team regarding preop testing.  Pt has had echo and EKG since consult on .  Pt denies chest pain, sob, dyspnea at this time.  Per pt, pain in abdomen is controlled. passing gas. No N/V. NG tube still in place. No fever CP, SOB, diarrhea, change in urinary freq.        MEDICATIONS  (STANDING):  benzocaine 15 mG/menthol 3.6 mG Lozenge 1 Lozenge Oral three times a day  chlorhexidine 2% Cloths 1 Application(s) Topical daily  dextrose 5% + sodium chloride 0.45% with potassium chloride 20 mEq/L 1000 milliLiter(s) (100 mL/Hr) IV Continuous <Continuous>  dextrose 5%. 1000 milliLiter(s) (50 mL/Hr) IV Continuous <Continuous>  dextrose 50% Injectable 12.5 Gram(s) IV Push once  dextrose 50% Injectable 25 Gram(s) IV Push once  dextrose 50% Injectable 25 Gram(s) IV Push once  enoxaparin Injectable 30 milliGRAM(s) SubCutaneous two times a day  insulin lispro (HumaLOG) corrective regimen sliding scale   SubCutaneous every 6 hours  nystatin Powder 1 Application(s) Topical every 12 hours  pantoprazole  Injectable 40 milliGRAM(s) IV Push daily    MEDICATIONS  (PRN):  dextrose 40% Gel 15 Gram(s) Oral once PRN Blood Glucose LESS THAN 70 milliGRAM(s)/deciliter  enalaprilat Injectable 1.25 milliGRAM(s) IV Push every 6 hours PRN hypertension  glucagon  Injectable 1 milliGRAM(s) IntraMuscular once PRN Glucose LESS THAN 70 milligrams/deciliter      Objective    Vital Signs Last 24 Hrs  T(C): 36.8 (03 Sep 2019 11:00), Max: 37.1 (02 Sep 2019 22:09)  T(F): 98.3 (03 Sep 2019 11:00), Max: 98.8 (02 Sep 2019 22:09)  HR: 74 (03 Sep 2019 11:00) (74 - 85)  BP: 146/57 (03 Sep 2019 11:00) (139/55 - 152/71)  BP(mean): --  RR: 18 (03 Sep 2019 11:00) (18 - 18)  SpO2: 95% (03 Sep 2019 11:00) (95% - 97%)      CAPILLARY BLOOD GLUCOSE      POCT Blood Glucose.: 123 mg/dL (03 Sep 2019 18:02)  POCT Blood Glucose.: 126 mg/dL (03 Sep 2019 12:36)  POCT Blood Glucose.: 126 mg/dL (03 Sep 2019 07:16)  POCT Blood Glucose.: 140 mg/dL (02 Sep 2019 23:51)        19 @ 07:01  -  19 @ 07:00  --------------------------------------------------------  IN: 0 mL / OUT: 2350 mL / NET: -2350 mL        Appearance: Sitting in bed, NAD  HEENT:  Pt missing teeth in front after dental extraction  Cardiovascular: RRR, No JVD, No murmurs, gallops or rubs appreciated  Respiratory: Lungs clear to auscultation bilaterally, no wheezes, crackles appreciated  Gastrointestinal:  Soft, nondistended, minimally tender	  Skin: No rashes, eccymosis or cyanosis noted	  Neurologic: AOx3, CNII-XII grossly intact, motor and sensory function grossly intact  Extremities: Moving all extremities equally  Vascular: 1+ pitting edema b/l  Psych:  Normal mood and affect, responds to questions appropriately          144  |  106  |  6<L>  ----------------------------<  137<H>  4.0   |  29  |  0.48<L>      146<H>  |  106  |  7   ----------------------------<  133<H>  3.8   |  32<H>  |  0.50      144  |  103  |  8   ----------------------------<  178<H>  3.4<L>   |  32<H>  |  0.49<L>    Ca    8.6      03 Sep 2019 07:15  Ca    8.4      02 Sep 2019 02:59  Ca    8.2<L>      01 Sep 2019 04:30  Phos  2.6     09-03  Mg     2.1     09-03        PT/INR - ( 03 Sep 2019 07:15 )   PT: 12.4 SEC;   INR: 1.11          PTT - ( 03 Sep 2019 07:15 )  PTT:25.4 SEC              Urinalysis Basic - ( 03 Sep 2019 17:20 )    Color: YELLOW / Appearance: TURBID / S.031 / pH: 8.5  Gluc: NEGATIVE / Ketone: NEGATIVE  / Bili: NEGATIVE / Urobili: 3.0   Blood: MODERATE / Protein: 50 / Nitrite: NEGATIVE   Leuk Esterase: NEGATIVE / RBC: 25-50 / WBC 2-5   Sq Epi: x / Non Sq Epi: x / Bacteria: LARGE                              9.5    11.89 )-----------( 236      ( 03 Sep 2019 07:15 )             33.1                         9.0    11.36 )-----------( 251      ( 02 Sep 2019 02:59 )             31.4                         8.9    9.62  )-----------( 231      ( 01 Sep 2019 04:30 )             31.3     CAPILLARY BLOOD GLUCOSE      POCT Blood Glucose.: 123 mg/dL (03 Sep 2019 18:02)  POCT Blood Glucose.: 126 mg/dL (03 Sep 2019 12:36)  POCT Blood Glucose.: 126 mg/dL (03 Sep 2019 07:16)  POCT Blood Glucose.: 140 mg/dL (02 Sep 2019 23:51)    Radiology & Imaging:  < from: Transthoracic Echocardiogram (19 @ 12:11) >  CONCLUSIONS:  1. Normal trileaflet aortic valve.  2. Mild concentric left ventricular hypertrophy.  3. Normal left ventricular systolic function. No segmental  wall motion abnormalities.  4. Normal right ventricular size and function.  5. Normal tricuspid valve.     Mild tricuspid  regurgitation.  6. Estimated pulmonary artery systolic pressure equals 55  mm Hg, assuming right atrial pressure equals 10  mm Hg,  consistent with moderate pulmonary hypertension.    < end of copied text >      Consultant Notes Reviewed:  discussed case with general surgery team

## 2019-09-03 NOTE — PROGRESS NOTE ADULT - SUBJECTIVE AND OBJECTIVE BOX
GENERAL SURGERY PROGRESS NOTE    SUBJECTIVE: NAEO. Patient passing flatus, has not had any bowel movements. Says she is feeling okay, troat sore from NGT. No n/v. Pain improved.     10-point review of systems completed and negative except as noted above.      OBJECTIVE    MEDICATIONS  benzocaine 15 mG/menthol 3.6 mG Lozenge 1 Lozenge Oral three times a day  chlorhexidine 2% Cloths 1 Application(s) Topical daily  dextrose 40% Gel 15 Gram(s) Oral once PRN  dextrose 5% + sodium chloride 0.45% with potassium chloride 20 mEq/L 1000 milliLiter(s) IV Continuous <Continuous>  dextrose 5%. 1000 milliLiter(s) IV Continuous <Continuous>  dextrose 50% Injectable 12.5 Gram(s) IV Push once  dextrose 50% Injectable 25 Gram(s) IV Push once  dextrose 50% Injectable 25 Gram(s) IV Push once  enalaprilat Injectable 1.25 milliGRAM(s) IV Push every 6 hours PRN  enoxaparin Injectable 30 milliGRAM(s) SubCutaneous two times a day  glucagon  Injectable 1 milliGRAM(s) IntraMuscular once PRN  insulin lispro (HumaLOG) corrective regimen sliding scale   SubCutaneous every 6 hours  pantoprazole  Injectable 40 milliGRAM(s) IV Push daily      PHYSICAL EXAM  Vital Signs Last 24 Hrs  T(C): 36.9 (03 Sep 2019 06:51), Max: 37.1 (02 Sep 2019 22:09)  T(F): 98.5 (03 Sep 2019 06:51), Max: 98.8 (02 Sep 2019 22:09)  HR: 74 (03 Sep 2019 06:51) (74 - 86)  BP: 145/56 (03 Sep 2019 06:51) (139/55 - 152/71)  BP(mean): --  RR: 18 (03 Sep 2019 06:51) (16 - 20)  SpO2: 96% (03 Sep 2019 06:51) (93% - 98%)      General: Appears well, NAD  Neuro: AAOx3  CHEST: breathing comfortably  CV: appears well perfused  Abdomen: obese, soft, nontender, nondistended, NGT draining dark fluid  Extremities: Grossly symmetric      I&O's Detail    02 Sep 2019 07:01  -  03 Sep 2019 07:00  --------------------------------------------------------  IN:  Total IN: 0 mL    OUT:    Nasoenteral Tube: 1400 mL    Voided: 950 mL  Total OUT: 2350 mL    Total NET: -2350 mL          LABS                   09-03    144  |  106  |  6<L>  ----------------------------<  137<H>  4.0   |  29  |  0.48<L>    Ca    8.6      03 Sep 2019 07:15  Phos  2.6     09-03  Mg     2.1     09-03                            9.5    11.89 )-----------( 236      ( 03 Sep 2019 07:15 )             33.1

## 2019-09-03 NOTE — PROGRESS NOTE ADULT - ASSESSMENT
Assessment:  Pt is a 68F presenting with epigastric abdominal pain, with CT showing ventral hernia with small bowel and fluid in hernia sac and a large ovarian cyst 10cm with calcification. Doing well with return of bowel function. Plan for potential OR Friday in conjunction with [facility] for removal of ovarian cyst and ventral hernia repair.     Plan:  - dental re: loose teeth - no contraindications to local anesthetic and no need for prophylactic antibiotics (no history of valvular surgery), plan for exam in dental office 9/3 AM with dental team  - OR book for 9/6 with gyn onc  - NPO/NGT  - Continue to monitor GI function  - replete lytes PRN  - Will obtain ekg, for medical clearance for OR. Look into records for OP stress test.             - echo completed w/ EF 65            - Medicine for preop clearance   - PT: rehab facility, case management aware and patient amenable pending operative plan  -change dvt ppx to BID LVX 30mg sq    Dispo pending authorization    B Team Surgery  [id number]

## 2019-09-03 NOTE — PROGRESS NOTE ADULT - ASSESSMENT
68F with non-insulin dependent DM, CAD? presented for ventral hernia repair. Patient reports no significant cardiac history. denied exertional symptoms but exertion is limited due to arthritis. Pt states she had full workup at [facility] including nuclear stress test.    - Echo done on 9/1 is wnl, EKG also wnl.  - Dental eval completed and loose teeth were extracted today under local anesthesia.  - c/w ISS for DM.  - will need to obtain stress test from [facility] to further assess cardiac status given poor functional state.  Discussed with patient today regarding contacting [facility] to obtain stress testing.  - will f/u with further assessment depending on above results.   - Case discussed with Hospitalist Attending, Dr. Ant Heart    Discussed with Surgical resident    Please call with questions. 68F with non-insulin dependent DM, CAD?, presented abn pain 2/2 SBO and now pending ventral hernia repair. Clinically improved with NG tube and surgery team to remove NG tube today, advance to clears.  Patient reports no significant cardiac history. Denies exertional symptoms but exertion is limited due to arthritis. Pt states she had full workup at [facility] including nuclear stress test.    - Echo done on 9/1 with normal LV function, no wall motion abnormality. EKG low voltage, NSR, with no ischemic changes   - Dental eval completed and loose teeth were extracted today under local anesthesia.  - c/w ISS for DM.  - will obtain stress test from [facility] to further assess cardiac status given poor functional state (has difficulty accomplishing >4 mets). medical release form filled out and we will have [facility] fax form on 9/4 and review results. RCRI score currently 2 but pending stress results (high risk surgery and insulin dependent DM)  - Case discussed with Hospitalist Attending, Dr. Ant Heart    Discussed with Surgical resident    Please call with questions. 68F with non-insulin dependent DM, CAD?, presented abn pain 2/2 SBO and now pending ventral hernia repair. Clinically improved with NG tube and surgery team to remove NG tube today, advance to clears.  Patient reports no significant cardiac history. Denies exertional symptoms but exertion is limited due to arthritis. Pt states she had full workup at [facility] including nuclear stress test.    - Echo done on 9/1 with normal LV function, no wall motion abnormality. EKG low voltage, NSR, with no ischemic changes   - Dental eval completed and loose teeth were extracted today under local anesthesia.  - c/w ISS for DM.  - will obtain stress test from [facility] to further assess cardiac status given poor functional state (has difficulty accomplishing >4 mets). medical release form filled out and we will have [facility] fax form on 9/4 and review results. RCRI score currently 1 but pending stress results (high risk surgery)  - Case discussed with Hospitalist Attending, Dr. Ant Heart    Discussed with Surgical resident    Please call with questions.

## 2019-09-03 NOTE — CONSULT NOTE ADULT - SUBJECTIVE AND OBJECTIVE BOX
Patient is a 68y old  Female who presents with a chief complaint of Small Bowel Obstruction (03 Sep 2019 11:07)    Dental paged 9/3/2019 for evaluation of teeth that are of aspiration risk for upcoming intubation procedure.    Limited bedside exam performed. EOE/IOE WNL. Significant calculus bridge present on mandibular anterior. Teeth #3, 23, 25, 27, and 28 class 3 mobility. Teeth #22 and 23 are of class 1 mobility. All other teeth are maxillary and stable.     Spoke to MD team for dental clearance. Both verbal and written medical clearance given for invasive dental treatment.     Patient transported to [facility] dental clinic for evaluation and extraction of teeth that are of class 3 mobility.   (4) PA radiographs taken. Teeth in question are held in by soft tissue/minimal bone - hopeless restorable prognosis.   Consent obtained to extract teeth #3, 23, 25, 27, and 28.     3.4cc 2% lidocaine with 1:100K epi via buccal and circumferential infiltration of teeth #3, 23, 25, 27, and 28. Tissue mobilized with periosteal elevator and delivered with forceps without complication. Gauze placed, hemostasis achieved. EBV loss: ~5cc.    Post operative instructions given:  (1) Soft food diet. Avoid small foods that can get trapped in the socket.   (2) Warm salt water rinses after every meal.   (3) Tylenol prn pain  (4) No drinking through straw or forcefully spitting to avoid dislodging clot.     Recommendations:  (1) Follow up with outpatient dentist for fabrication of RICARDA RPD or extraction of #22 and #23, fabrication of RICARDA CD.     ASSESSMENT : Patient cleared by dental to proceed with intubation. Remaining dentition is stable.     ---------  HPI:  67yo F with MHx of morbid obesity (BMI 50), DM2, HTN, and known ventral hernia presented 8/30 from rehab after previous admission on 8/10 for SBO (d/c'd on 8/16) with c/c of worsening intermittant abd pain x3 days associated with nausea, multiple episodes of NBNB vomiting, and PO intolerance. SBO on 8/10 resolved resolved with nonoperative mngt and she was discharged to rehab. In rehab patient's last flatus was on 8/27 and last BM on 8/28 after an enema. No BM or flatus thereafter. Xray was taken at rehab and patient was transferred to [facility] ER for concern of SBO. In the ED, CT showed SBO with transition point in the known ventral hernia similar to prior admission and redemonstration of ~18cm left adnexal mass. An NGT was subsequently placed. Hernia not repaired on previous admission due to body habitus and adnexal mass. Patient denies dizziness, CP, SOB, or dysuria. (30 Aug 2019 21:55)      PAST MEDICAL & SURGICAL HISTORY:  SBO (small bowel obstruction)  CAD (coronary artery disease)  DM (diabetes mellitus)  HTN (hypertension)  No significant past surgical history      MEDICATIONS  (STANDING):  benzocaine 15 mG/menthol 3.6 mG Lozenge 1 Lozenge Oral three times a day  chlorhexidine 2% Cloths 1 Application(s) Topical daily  dextrose 5% + sodium chloride 0.45% with potassium chloride 20 mEq/L 1000 milliLiter(s) (100 mL/Hr) IV Continuous <Continuous>  dextrose 5%. 1000 milliLiter(s) (50 mL/Hr) IV Continuous <Continuous>  dextrose 50% Injectable 12.5 Gram(s) IV Push once  dextrose 50% Injectable 25 Gram(s) IV Push once  dextrose 50% Injectable 25 Gram(s) IV Push once  enoxaparin Injectable 30 milliGRAM(s) SubCutaneous two times a day  insulin lispro (HumaLOG) corrective regimen sliding scale   SubCutaneous every 6 hours  nystatin Powder 1 Application(s) Topical every 12 hours  pantoprazole  Injectable 40 milliGRAM(s) IV Push daily    MEDICATIONS  (PRN):  dextrose 40% Gel 15 Gram(s) Oral once PRN Blood Glucose LESS THAN 70 milliGRAM(s)/deciliter  enalaprilat Injectable 1.25 milliGRAM(s) IV Push every 6 hours PRN hypertension  glucagon  Injectable 1 milliGRAM(s) IntraMuscular once PRN Glucose LESS THAN 70 milligrams/deciliter      Allergies    No Known Allergies    Intolerances        FAMILY HISTORY:      *SOCIAL HISTORY: (guardian or who pt came with), (smoking hx)    *Last Dental Visit:    Vital Signs Last 24 Hrs  T(C): 36.8 (03 Sep 2019 11:00), Max: 37.1 (02 Sep 2019 22:09)  T(F): 98.3 (03 Sep 2019 11:00), Max: 98.8 (02 Sep 2019 22:09)  HR: 74 (03 Sep 2019 11:00) (74 - 85)  BP: 146/57 (03 Sep 2019 11:00) (139/55 - 152/71)  BP(mean): --  RR: 18 (03 Sep 2019 11:00) (18 - 20)  SpO2: 95% (03 Sep 2019 11:00) (93% - 97%)    EOE:  TMJ (  - ) clicks                    (   - ) pops                    (  -  ) crepitus             Mandible FROM             Facial bones and MOM grossly intact             ( -  ) trismus             ( -  ) LAD             ( -  ) swelling             ( -  ) asymmetry             ( -  ) palpation             ( -  ) SOB             ( -  ) dysphagia             ( -  ) LOC    IOE:  permanent/primary/mixed>> dentition: <<grossly intact>> OR <<multiple carious teeth>> OR <<multiple missing teeth>>           hard/soft palate:  (   ) palatal torus           tongue/FOM <<WNL>>           labial/buccal mucosa <<WNL>>           (   ) percussion           (   ) palpation           (   ) swelling     Dentition present: <<   >>  Mobility: <<  >>  Caries: <<   >>     LABS:                        9.5    11.89 )-----------( 236      ( 03 Sep 2019 07:15 )             33.1     09-03    144  |  106  |  6<L>  ----------------------------<  137<H>  4.0   |  29  |  0.48<L>    Ca    8.6      03 Sep 2019 07:15  Phos  2.6     09-03  Mg     2.1     09-03      WBC Count: 11.89 K/uL <H> [3.8 - 10.5] (09-03 @ 07:15)  Platelet Count - Automated: 236 K/uL [150 - 400] (09-03 @ 07:15)  INR: 1.11 [0.88 - 1.17] (09-03 @ 07:15)  WBC Count: 11.36 K/uL <H> [3.8 - 10.5] (09-02 @ 02:59)  Platelet Count - Automated: 251 K/uL [150 - 400] (09-02 @ 02:59)  WBC Count: 9.62 K/uL [3.8 - 10.5] (09-01 @ 04:30)  Platelet Count - Automated: 231 K/uL [150 - 400] (09-01 @ 04:30)  WBC Count: 8.97 K/uL [3.8 - 10.5] (08-31 @ 18:45)  Platelet Count - Automated: 246 K/uL [150 - 400] (08-31 @ 18:45)        *DENTAL RADIOGRAPHS:     RADIOLOGY & ADDITIONAL STUDIES:    ASSESSMENT:    PROCEDURE:  Verbal <<and written>> consent given.     RECOMMENDATIONS:  1) <<   >>  2) Dental F/U with outpatient dentist for comprehensive dental care.   3) If any difficulty swallowing/breathing, fever occur, page dental.     Resident Name, pager # Patient is a 68y old  Female who presents with a chief complaint of Small Bowel Obstruction (03 Sep 2019 11:07)    Dental paged 9/3/2019 for evaluation of teeth that are of aspiration risk for upcoming intubation procedure.    Limited bedside exam performed. EOE/IOE WNL. Significant calculus bridge present on mandibular anterior. Teeth #3, 23, 25, 27, and 28 class 3 mobility. Teeth #21 and 22 are of class 1 mobility. All other teeth are maxillary and stable.     Spoke to MD team for dental clearance. Both verbal and written medical clearance given for invasive dental treatment.     Patient transported to [facility] dental clinic for evaluation and extraction of teeth that are of class 3 mobility.   (4) PA radiographs taken. Teeth in question are held in by soft tissue/minimal bone - hopeless restorative prognosis.   Consent obtained to extract teeth #3, 23, 25, 27, and 28.     3.4cc 2% lidocaine with 1:100K epi via buccal and circumferential infiltration of teeth #3, 23, 25, 27, and 28. Tissue mobilized with periosteal elevator and delivered with forceps without complication. Gauze placed, hemostasis achieved. EBV loss: ~5cc.    Post operative instructions given:  (1) Soft food diet. Avoid small foods that can get trapped in the socket.   (2) Warm salt water rinses after every meal.   (3) Tylenol prn pain  (4) No drinking through straw or forcefully spitting to avoid dislodging clot.     Recommendations:  (1) Follow up with outpatient dentist for (1) fabrication of RICARDA RPD or (2) extraction of #22 and #23 and fabrication of RICARDA CD.     ASSESSMENT : Patient cleared by dental to proceed with intubation. Remaining dentition is stable.     ---------  HPI:  67yo F with MHx of morbid obesity (BMI 50), DM2, HTN, and known ventral hernia presented 8/30 from rehab after previous admission on 8/10 for SBO (d/c'd on 8/16) with c/c of worsening intermittant abd pain x3 days associated with nausea, multiple episodes of NBNB vomiting, and PO intolerance. SBO on 8/10 resolved resolved with nonoperative mngt and she was discharged to rehab. In rehab patient's last flatus was on 8/27 and last BM on 8/28 after an enema. No BM or flatus thereafter. Xray was taken at rehab and patient was transferred to [facility] ER for concern of SBO. In the ED, CT showed SBO with transition point in the known ventral hernia similar to prior admission and redemonstration of ~18cm left adnexal mass. An NGT was subsequently placed. Hernia not repaired on previous admission due to body habitus and adnexal mass. Patient denies dizziness, CP, SOB, or dysuria. (30 Aug 2019 21:55)      PAST MEDICAL & SURGICAL HISTORY:  SBO (small bowel obstruction)  CAD (coronary artery disease)  DM (diabetes mellitus)  HTN (hypertension)  No significant past surgical history      MEDICATIONS  (STANDING):  benzocaine 15 mG/menthol 3.6 mG Lozenge 1 Lozenge Oral three times a day  chlorhexidine 2% Cloths 1 Application(s) Topical daily  dextrose 5% + sodium chloride 0.45% with potassium chloride 20 mEq/L 1000 milliLiter(s) (100 mL/Hr) IV Continuous <Continuous>  dextrose 5%. 1000 milliLiter(s) (50 mL/Hr) IV Continuous <Continuous>  dextrose 50% Injectable 12.5 Gram(s) IV Push once  dextrose 50% Injectable 25 Gram(s) IV Push once  dextrose 50% Injectable 25 Gram(s) IV Push once  enoxaparin Injectable 30 milliGRAM(s) SubCutaneous two times a day  insulin lispro (HumaLOG) corrective regimen sliding scale   SubCutaneous every 6 hours  nystatin Powder 1 Application(s) Topical every 12 hours  pantoprazole  Injectable 40 milliGRAM(s) IV Push daily    MEDICATIONS  (PRN):  dextrose 40% Gel 15 Gram(s) Oral once PRN Blood Glucose LESS THAN 70 milliGRAM(s)/deciliter  enalaprilat Injectable 1.25 milliGRAM(s) IV Push every 6 hours PRN hypertension  glucagon  Injectable 1 milliGRAM(s) IntraMuscular once PRN Glucose LESS THAN 70 milligrams/deciliter      Allergies    No Known Allergies    Intolerances        FAMILY HISTORY:      *SOCIAL HISTORY: (guardian or who pt came with), (smoking hx)    *Last Dental Visit:    Vital Signs Last 24 Hrs  T(C): 36.8 (03 Sep 2019 11:00), Max: 37.1 (02 Sep 2019 22:09)  T(F): 98.3 (03 Sep 2019 11:00), Max: 98.8 (02 Sep 2019 22:09)  HR: 74 (03 Sep 2019 11:00) (74 - 85)  BP: 146/57 (03 Sep 2019 11:00) (139/55 - 152/71)  BP(mean): --  RR: 18 (03 Sep 2019 11:00) (18 - 20)  SpO2: 95% (03 Sep 2019 11:00) (93% - 97%)    EOE:  TMJ (  - ) clicks                    (   - ) pops                    (  -  ) crepitus             Mandible FROM             Facial bones and MOM grossly intact             ( -  ) trismus             ( -  ) LAD             ( -  ) swelling             ( -  ) asymmetry             ( -  ) palpation             ( -  ) SOB             ( -  ) dysphagia             ( -  ) LOC    IOE:  permanent/primary/mixed dentition: grossly intact, multiple missing teeth           hard/soft palate:  ( -  ) palatal torus           tongue/FOM WNL           labial/buccal mucosa WNL           ( -  ) percussion           ( -  ) palpation           (  - ) swelling       LABS:                        9.5    11.89 )-----------( 236      ( 03 Sep 2019 07:15 )             33.1     09-03    144  |  106  |  6<L>  ----------------------------<  137<H>  4.0   |  29  |  0.48<L>    Ca    8.6      03 Sep 2019 07:15  Phos  2.6     09-03  Mg     2.1     09-03      WBC Count: 11.89 K/uL <H> [3.8 - 10.5] (09-03 @ 07:15)  Platelet Count - Automated: 236 K/uL [150 - 400] (09-03 @ 07:15)  INR: 1.11 [0.88 - 1.17] (09-03 @ 07:15)  WBC Count: 11.36 K/uL <H> [3.8 - 10.5] (09-02 @ 02:59)  Platelet Count - Automated: 251 K/uL [150 - 400] (09-02 @ 02:59)  WBC Count: 9.62 K/uL [3.8 - 10.5] (09-01 @ 04:30)  Platelet Count - Automated: 231 K/uL [150 - 400] (09-01 @ 04:30)  WBC Count: 8.97 K/uL [3.8 - 10.5] (08-31 @ 18:45)  Platelet Count - Automated: 246 K/uL [150 - 400] (08-31 @ 18:45)        DENTAL RADIOGRAPHS: (4) PA radiographs. Teeth in question are held in by soft tissue/minimal bone - hopeless restorative prognosis.       ASSESSMENT: Teeth #3, 23, 25, 27, and 28 are class 3 mobility/aspiration risk and were extracted. Remaining MAX dentition is stable. Remaining mandibular dentition (#21, #22) stable - follow up with outpatient dentist to determine final treatment plan for RICARDA teeth. Patient is cleared by dental to proceed with intubation - all immediate aspiration risks cleared.     PROCEDURE:  3.4cc 2% lidocaine with 1:100K epi via buccal and circumferential infiltration of teeth #3, 23, 25, 27, and 28. Tissue mobilized with periosteal elevator and delivered with forceps without complication. Gauze placed, hemostasis achieved. EBV loss: ~5cc. POIG.     RECOMMENDATIONS:  1) Dental F/U with outpatient dentist for comprehensive dental care.   2) If any difficulty swallowing/breathing, fever occur, page dental.     Jessica Coleman DDS [id number] Patient is a 68y old  Female who presents with a chief complaint of Small Bowel Obstruction (03 Sep 2019 11:07)    Dental paged 9/3/2019 for evaluation of teeth that are of aspiration risk for upcoming intubation procedure.    Limited bedside exam performed. EOE/IOE WNL. Significant calculus bridge present on mandibular anterior. Teeth #3, 23, 25, 27, and 28 class 3 mobility. Teeth #21 and 22 are of class 1 mobility. All other teeth are maxillary and stable.     Spoke to MD team for dental clearance. Both verbal and written medical clearance given for invasive dental treatment.     Patient transported to [facility] dental clinic for evaluation and extraction of teeth that are of class 3 mobility.   (4) PA radiographs taken. Teeth in question are held in by soft tissue/minimal bone - hopeless restorative prognosis.   Consent obtained to extract teeth #3, 23, 25, 27, and 28.     3.4cc 2% lidocaine with 1:100K epi via buccal and circumferential infiltration of teeth #3, 23, 25, 27, and 28. Tissue mobilized with periosteal elevator and delivered with forceps without complication. Gauze placed, hemostasis achieved. EBV loss: ~5cc.    Post operative instructions given:  (1) Soft food diet. Avoid small foods that can get trapped in the socket.   (2) Warm salt water rinses after every meal.   (3) Tylenol prn pain  (4) No drinking through straw or forcefully spitting to avoid dislodging clot.     Recommendations:  (1) Follow up with outpatient dentist for (1) fabrication of RICARDA RPD or (2) extraction of #22 and #21 and fabrication of RICARDA CD.     ASSESSMENT : Patient cleared by dental to proceed with intubation. Remaining dentition is stable.     ---------  HPI:  69yo F with MHx of morbid obesity (BMI 50), DM2, HTN, and known ventral hernia presented 8/30 from rehab after previous admission on 8/10 for SBO (d/c'd on 8/16) with c/c of worsening intermittant abd pain x3 days associated with nausea, multiple episodes of NBNB vomiting, and PO intolerance. SBO on 8/10 resolved resolved with nonoperative mngt and she was discharged to rehab. In rehab patient's last flatus was on 8/27 and last BM on 8/28 after an enema. No BM or flatus thereafter. Xray was taken at rehab and patient was transferred to [facility] ER for concern of SBO. In the ED, CT showed SBO with transition point in the known ventral hernia similar to prior admission and redemonstration of ~18cm left adnexal mass. An NGT was subsequently placed. Hernia not repaired on previous admission due to body habitus and adnexal mass. Patient denies dizziness, CP, SOB, or dysuria. (30 Aug 2019 21:55)      PAST MEDICAL & SURGICAL HISTORY:  SBO (small bowel obstruction)  CAD (coronary artery disease)  DM (diabetes mellitus)  HTN (hypertension)  No significant past surgical history      MEDICATIONS  (STANDING):  benzocaine 15 mG/menthol 3.6 mG Lozenge 1 Lozenge Oral three times a day  chlorhexidine 2% Cloths 1 Application(s) Topical daily  dextrose 5% + sodium chloride 0.45% with potassium chloride 20 mEq/L 1000 milliLiter(s) (100 mL/Hr) IV Continuous <Continuous>  dextrose 5%. 1000 milliLiter(s) (50 mL/Hr) IV Continuous <Continuous>  dextrose 50% Injectable 12.5 Gram(s) IV Push once  dextrose 50% Injectable 25 Gram(s) IV Push once  dextrose 50% Injectable 25 Gram(s) IV Push once  enoxaparin Injectable 30 milliGRAM(s) SubCutaneous two times a day  insulin lispro (HumaLOG) corrective regimen sliding scale   SubCutaneous every 6 hours  nystatin Powder 1 Application(s) Topical every 12 hours  pantoprazole  Injectable 40 milliGRAM(s) IV Push daily    MEDICATIONS  (PRN):  dextrose 40% Gel 15 Gram(s) Oral once PRN Blood Glucose LESS THAN 70 milliGRAM(s)/deciliter  enalaprilat Injectable 1.25 milliGRAM(s) IV Push every 6 hours PRN hypertension  glucagon  Injectable 1 milliGRAM(s) IntraMuscular once PRN Glucose LESS THAN 70 milligrams/deciliter      Allergies    No Known Allergies    Intolerances        FAMILY HISTORY:      *SOCIAL HISTORY: (guardian or who pt came with), (smoking hx)    *Last Dental Visit:    Vital Signs Last 24 Hrs  T(C): 36.8 (03 Sep 2019 11:00), Max: 37.1 (02 Sep 2019 22:09)  T(F): 98.3 (03 Sep 2019 11:00), Max: 98.8 (02 Sep 2019 22:09)  HR: 74 (03 Sep 2019 11:00) (74 - 85)  BP: 146/57 (03 Sep 2019 11:00) (139/55 - 152/71)  BP(mean): --  RR: 18 (03 Sep 2019 11:00) (18 - 20)  SpO2: 95% (03 Sep 2019 11:00) (93% - 97%)    EOE:  TMJ (  - ) clicks                    (   - ) pops                    (  -  ) crepitus             Mandible FROM             Facial bones and MOM grossly intact             ( -  ) trismus             ( -  ) LAD             ( -  ) swelling             ( -  ) asymmetry             ( -  ) palpation             ( -  ) SOB             ( -  ) dysphagia             ( -  ) LOC    IOE:  permanent/primary/mixed dentition: grossly intact, multiple missing teeth           hard/soft palate:  ( -  ) palatal torus           tongue/FOM WNL           labial/buccal mucosa WNL           ( -  ) percussion           ( -  ) palpation           (  - ) swelling       LABS:                        9.5    11.89 )-----------( 236      ( 03 Sep 2019 07:15 )             33.1     09-03    144  |  106  |  6<L>  ----------------------------<  137<H>  4.0   |  29  |  0.48<L>    Ca    8.6      03 Sep 2019 07:15  Phos  2.6     09-03  Mg     2.1     09-03      WBC Count: 11.89 K/uL <H> [3.8 - 10.5] (09-03 @ 07:15)  Platelet Count - Automated: 236 K/uL [150 - 400] (09-03 @ 07:15)  INR: 1.11 [0.88 - 1.17] (09-03 @ 07:15)  WBC Count: 11.36 K/uL <H> [3.8 - 10.5] (09-02 @ 02:59)  Platelet Count - Automated: 251 K/uL [150 - 400] (09-02 @ 02:59)  WBC Count: 9.62 K/uL [3.8 - 10.5] (09-01 @ 04:30)  Platelet Count - Automated: 231 K/uL [150 - 400] (09-01 @ 04:30)  WBC Count: 8.97 K/uL [3.8 - 10.5] (08-31 @ 18:45)  Platelet Count - Automated: 246 K/uL [150 - 400] (08-31 @ 18:45)        DENTAL RADIOGRAPHS: (4) PA radiographs. Teeth in question are held in by soft tissue/minimal bone - hopeless restorative prognosis.       ASSESSMENT: Teeth #3, 23, 25, 27, and 28 are class 3 mobility/aspiration risk and were extracted. Remaining MAX dentition is stable. Remaining mandibular dentition (#21, #22) stable - follow up with outpatient dentist to determine final treatment plan for RICARDA teeth. Patient is cleared by dental to proceed with intubation - all immediate aspiration risks cleared.     PROCEDURE:  3.4cc 2% lidocaine with 1:100K epi via buccal and circumferential infiltration of teeth #3, 23, 25, 27, and 28. Tissue mobilized with periosteal elevator and delivered with forceps without complication. Gauze placed, hemostasis achieved. EBV loss: ~5cc. POIG.     RECOMMENDATIONS:  1) Dental F/U with outpatient dentist for comprehensive dental care.   2) If any difficulty swallowing/breathing, fever occur, page dental.     Jessica Coleman DDS [id number]

## 2019-09-04 LAB
ANION GAP SERPL CALC-SCNC: 9 MMO/L — SIGNIFICANT CHANGE UP (ref 7–14)
APTT BLD: 28.3 SEC — SIGNIFICANT CHANGE UP (ref 27.5–36.3)
BASOPHILS # BLD AUTO: 0.02 K/UL — SIGNIFICANT CHANGE UP (ref 0–0.2)
BASOPHILS NFR BLD AUTO: 0.2 % — SIGNIFICANT CHANGE UP (ref 0–2)
BLD GP AB SCN SERPL QL: NEGATIVE — SIGNIFICANT CHANGE UP
BUN SERPL-MCNC: 6 MG/DL — LOW (ref 7–23)
CALCIUM SERPL-MCNC: 8.5 MG/DL — SIGNIFICANT CHANGE UP (ref 8.4–10.5)
CHLORIDE SERPL-SCNC: 105 MMOL/L — SIGNIFICANT CHANGE UP (ref 98–107)
CO2 SERPL-SCNC: 26 MMOL/L — SIGNIFICANT CHANGE UP (ref 22–31)
CREAT SERPL-MCNC: 0.47 MG/DL — LOW (ref 0.5–1.3)
EOSINOPHIL # BLD AUTO: 0.38 K/UL — SIGNIFICANT CHANGE UP (ref 0–0.5)
EOSINOPHIL NFR BLD AUTO: 4.1 % — SIGNIFICANT CHANGE UP (ref 0–6)
GLUCOSE BLDC GLUCOMTR-MCNC: 109 MG/DL — HIGH (ref 70–99)
GLUCOSE BLDC GLUCOMTR-MCNC: 144 MG/DL — HIGH (ref 70–99)
GLUCOSE BLDC GLUCOMTR-MCNC: 145 MG/DL — HIGH (ref 70–99)
GLUCOSE BLDC GLUCOMTR-MCNC: 153 MG/DL — HIGH (ref 70–99)
GLUCOSE BLDC GLUCOMTR-MCNC: 155 MG/DL — HIGH (ref 70–99)
GLUCOSE SERPL-MCNC: 154 MG/DL — HIGH (ref 70–99)
HCT VFR BLD CALC: 32.9 % — LOW (ref 34.5–45)
HGB BLD-MCNC: 9.1 G/DL — LOW (ref 11.5–15.5)
IMM GRANULOCYTES NFR BLD AUTO: 1.4 % — SIGNIFICANT CHANGE UP (ref 0–1.5)
INR BLD: 1.08 — SIGNIFICANT CHANGE UP (ref 0.88–1.17)
LYMPHOCYTES # BLD AUTO: 2.31 K/UL — SIGNIFICANT CHANGE UP (ref 1–3.3)
LYMPHOCYTES # BLD AUTO: 24.6 % — SIGNIFICANT CHANGE UP (ref 13–44)
MAGNESIUM SERPL-MCNC: 1.9 MG/DL — SIGNIFICANT CHANGE UP (ref 1.6–2.6)
MCHC RBC-ENTMCNC: 23 PG — LOW (ref 27–34)
MCHC RBC-ENTMCNC: 27.7 % — LOW (ref 32–36)
MCV RBC AUTO: 83.3 FL — SIGNIFICANT CHANGE UP (ref 80–100)
MONOCYTES # BLD AUTO: 0.68 K/UL — SIGNIFICANT CHANGE UP (ref 0–0.9)
MONOCYTES NFR BLD AUTO: 7.2 % — SIGNIFICANT CHANGE UP (ref 2–14)
NEUTROPHILS # BLD AUTO: 5.86 K/UL — SIGNIFICANT CHANGE UP (ref 1.8–7.4)
NEUTROPHILS NFR BLD AUTO: 62.5 % — SIGNIFICANT CHANGE UP (ref 43–77)
NRBC # FLD: 0 K/UL — SIGNIFICANT CHANGE UP (ref 0–0)
PHOSPHATE SERPL-MCNC: 2.9 MG/DL — SIGNIFICANT CHANGE UP (ref 2.5–4.5)
PLATELET # BLD AUTO: 256 K/UL — SIGNIFICANT CHANGE UP (ref 150–400)
PMV BLD: 10 FL — SIGNIFICANT CHANGE UP (ref 7–13)
POTASSIUM SERPL-MCNC: 4.4 MMOL/L — SIGNIFICANT CHANGE UP (ref 3.5–5.3)
POTASSIUM SERPL-SCNC: 4.4 MMOL/L — SIGNIFICANT CHANGE UP (ref 3.5–5.3)
PROTHROM AB SERPL-ACNC: 12.3 SEC — SIGNIFICANT CHANGE UP (ref 9.8–13.1)
RBC # BLD: 3.95 M/UL — SIGNIFICANT CHANGE UP (ref 3.8–5.2)
RBC # FLD: 15.5 % — HIGH (ref 10.3–14.5)
RH IG SCN BLD-IMP: NEGATIVE — SIGNIFICANT CHANGE UP
SODIUM SERPL-SCNC: 140 MMOL/L — SIGNIFICANT CHANGE UP (ref 135–145)
WBC # BLD: 9.38 K/UL — SIGNIFICANT CHANGE UP (ref 3.8–10.5)
WBC # FLD AUTO: 9.38 K/UL — SIGNIFICANT CHANGE UP (ref 3.8–10.5)

## 2019-09-04 PROCEDURE — 71045 X-RAY EXAM CHEST 1 VIEW: CPT | Mod: 26

## 2019-09-04 PROCEDURE — 99233 SBSQ HOSP IP/OBS HIGH 50: CPT

## 2019-09-04 PROCEDURE — 99232 SBSQ HOSP IP/OBS MODERATE 35: CPT

## 2019-09-04 RX ORDER — MAGNESIUM SULFATE 500 MG/ML
2 VIAL (ML) INJECTION ONCE
Refills: 0 | Status: COMPLETED | OUTPATIENT
Start: 2019-09-04 | End: 2019-09-04

## 2019-09-04 RX ORDER — ACETAMINOPHEN 500 MG
1000 TABLET ORAL ONCE
Refills: 0 | Status: COMPLETED | OUTPATIENT
Start: 2019-09-04 | End: 2019-09-04

## 2019-09-04 RX ORDER — MAGNESIUM SULFATE 500 MG/ML
2 VIAL (ML) INJECTION ONCE
Refills: 0 | Status: DISCONTINUED | OUTPATIENT
Start: 2019-09-04 | End: 2019-09-04

## 2019-09-04 RX ADMIN — Medication 1000 MILLIGRAM(S): at 19:30

## 2019-09-04 RX ADMIN — Medication 50 GRAM(S): at 09:25

## 2019-09-04 RX ADMIN — NYSTATIN CREAM 1 APPLICATION(S): 100000 CREAM TOPICAL at 05:58

## 2019-09-04 RX ADMIN — Medication 400 MILLIGRAM(S): at 12:06

## 2019-09-04 RX ADMIN — Medication 400 MILLIGRAM(S): at 18:46

## 2019-09-04 RX ADMIN — Medication 1000 MILLIGRAM(S): at 13:00

## 2019-09-04 RX ADMIN — DEXTROSE MONOHYDRATE, SODIUM CHLORIDE, AND POTASSIUM CHLORIDE 75 MILLILITER(S): 50; .745; 4.5 INJECTION, SOLUTION INTRAVENOUS at 22:24

## 2019-09-04 RX ADMIN — ENOXAPARIN SODIUM 30 MILLIGRAM(S): 100 INJECTION SUBCUTANEOUS at 18:47

## 2019-09-04 RX ADMIN — ENOXAPARIN SODIUM 30 MILLIGRAM(S): 100 INJECTION SUBCUTANEOUS at 05:57

## 2019-09-04 RX ADMIN — Medication 1000 MILLIGRAM(S): at 07:15

## 2019-09-04 RX ADMIN — Medication 1: at 13:51

## 2019-09-04 RX ADMIN — BENZOCAINE AND MENTHOL 1 LOZENGE: 5; 1 LIQUID ORAL at 16:16

## 2019-09-04 RX ADMIN — PANTOPRAZOLE SODIUM 40 MILLIGRAM(S): 20 TABLET, DELAYED RELEASE ORAL at 12:07

## 2019-09-04 RX ADMIN — Medication 400 MILLIGRAM(S): at 06:30

## 2019-09-04 RX ADMIN — BENZOCAINE AND MENTHOL 1 LOZENGE: 5; 1 LIQUID ORAL at 22:25

## 2019-09-04 RX ADMIN — NYSTATIN CREAM 1 APPLICATION(S): 100000 CREAM TOPICAL at 18:47

## 2019-09-04 RX ADMIN — DEXTROSE MONOHYDRATE, SODIUM CHLORIDE, AND POTASSIUM CHLORIDE 75 MILLILITER(S): 50; .745; 4.5 INJECTION, SOLUTION INTRAVENOUS at 09:25

## 2019-09-04 NOTE — PHYSICAL THERAPY INITIAL EVALUATION ADULT - ADDITIONAL COMMENTS
patient presents to hospital from rehab facility. patient states she was able to ambulate 10 steps x2 with rolling walker and assistance x1 and wheelchair follow

## 2019-09-04 NOTE — PROGRESS NOTE ADULT - ASSESSMENT
Assessment:  Pt is a 68F presenting with epigastric abdominal pain, with CT showing ventral hernia with small bowel and fluid in hernia sac and a large ovarian cyst 10cm with calcification. Doing well with return of bowel function. Plan for potential OR Friday (noon) in conjunction with [facility] for removal of ovarian cyst and ventral hernia repair.     Plan:  - s/p dental assessment and tooth extraction  - OR book for 9/6 with gyn onc  - NGT removed, on CLD, tolerating  - Continue to monitor GI function  - replete lytes PRN  - Medical clearance for OR.             - Look into records for OP stress test.             - EKG obtained            - echo completed w/ EF 65            - Medicine for preop clearance   - PT: rehab facility, case management aware and patient amenable pending operative plan  - dvt ppx to BID LVX 30mg sq    Dispo pending authorization    B Team Surgery  [id number]

## 2019-09-04 NOTE — PROGRESS NOTE ADULT - SUBJECTIVE AND OBJECTIVE BOX
Patient is a 68y old  Female who presents with a chief complaint of abn pain, Small Bowel Obstruction (03 Sep 2019 16:36)    Interval History: Pt examined at bedside this afternoon. Denies chest pain, sob, dyspnea at this time. Had NG tube taken out yesterday evening but did not tolerate clears. NG tube replaced. Has mild abn pain improved after tylenol. No N/V. No fever CP, SOB, diarrhea, change in urinary freq.        MEDICATIONS  (STANDING):  benzocaine 15 mG/menthol 3.6 mG Lozenge 1 Lozenge Oral three times a day  chlorhexidine 2% Cloths 1 Application(s) Topical daily  dextrose 5% + sodium chloride 0.45% with potassium chloride 20 mEq/L 1000 milliLiter(s) (75 mL/Hr) IV Continuous <Continuous>  dextrose 50% Injectable 12.5 Gram(s) IV Push once  dextrose 50% Injectable 25 Gram(s) IV Push once  dextrose 50% Injectable 25 Gram(s) IV Push once  enoxaparin Injectable 30 milliGRAM(s) SubCutaneous two times a day  insulin lispro (HumaLOG) corrective regimen sliding scale   SubCutaneous every 6 hours  magnesium sulfate  IVPB 2 Gram(s) IV Intermittent once  nystatin Powder 1 Application(s) Topical every 12 hours  pantoprazole  Injectable 40 milliGRAM(s) IV Push daily    MEDICATIONS  (PRN):  dextrose 40% Gel 15 Gram(s) Oral once PRN Blood Glucose LESS THAN 70 milliGRAM(s)/deciliter  enalaprilat Injectable 1.25 milliGRAM(s) IV Push every 6 hours PRN hypertension  glucagon  Injectable 1 milliGRAM(s) IntraMuscular once PRN Glucose LESS THAN 70 milligrams/deciliter    Objective    Vital Signs Last 24 Hrs  T(C): 36.8 (19 @ 10:15), Max: 37.4 (19 @ 21:27)  T(F): 98.2 (19 @ 10:15), Max: 99.3 (19 @ 21:27)  HR: 72 (19 @ 10:15) (71 - 82)  BP: 181/70 (19 @ 10:15) (125/50 - 181/70)  BP(mean): --  RR: 18 (19 @ 10:15) (17 - 18)  SpO2: 95% (19 @ 10:15) (95% - 97%)      Appearance: Sitting in bed, NAD  HEENT:  Pt missing teeth in front after dental extraction  Cardiovascular: RRR, No JVD, No murmurs, gallops or rubs appreciated  Respiratory: Lungs clear to auscultation bilaterally, no wheezes, crackles appreciated  Gastrointestinal:  Soft, nondistended, minimally tender	  Skin: No rashes, eccymosis or cyanosis noted	  Neurologic: AOx3, CNII-XII grossly intact, motor and sensory function grossly intact  Extremities: Moving all extremities equally  Vascular: 1+ pitting edema b/l  Psych:  Normal mood and affect, responds to questions appropriately                          9.1    9.38  )-----------( 256      ( 04 Sep 2019 05:34 )             32.9       09-04    140  |  105  |  6<L>  ----------------------------<  154<H>  4.4   |  26  |  0.47<L>    Ca    8.5      04 Sep 2019 05:34  Phos  2.9     09-04  Mg     1.9     09-04    Urinalysis Basic - ( 03 Sep 2019 17:20 )    Color: YELLOW / Appearance: TURBID / S.031 / pH: 8.5  Gluc: NEGATIVE / Ketone: NEGATIVE  / Bili: NEGATIVE / Urobili: 3.0   Blood: MODERATE / Protein: 50 / Nitrite: NEGATIVE   Leuk Esterase: NEGATIVE / RBC: 25-50 / WBC 2-5   Sq Epi: x / Non Sq Epi: x / Bacteria: LARGE    PT/INR - ( 04 Sep 2019 05:34 )   PT: 12.3 SEC;   INR: 1.08     PTT - ( 04 Sep 2019 05:34 )  PTT:28.3 SEC    CAPILLARY BLOOD GLUCOS  POCT Blood Glucose.: 153 mg/dL (04 Sep 2019 08:53)      Radiology & Imaging:  < from: Transthoracic Echocardiogram (19 @ 12:11) >  CONCLUSIONS:  1. Normal trileaflet aortic valve.  2. Mild concentric left ventricular hypertrophy.  3. Normal left ventricular systolic function. No segmental  wall motion abnormalities.  4. Normal right ventricular size and function.  5. Normal tricuspid valve.     Mild tricuspid  regurgitation.  6. Estimated pulmonary artery systolic pressure equals 55  mm Hg, assuming right atrial pressure equals 10  mm Hg,  consistent with moderate pulmonary hypertension.    < end of copied text >      Consultant Notes Reviewed:  discussed case with general surgery team Patient is a 68y old  Female who presents with a chief complaint of abn pain, Small Bowel Obstruction (03 Sep 2019 16:36)    Interval History: Pt examined at bedside this afternoon. Denies chest pain, sob, dyspnea at this time. Had NG tube taken out yesterday evening but did not tolerate clears. NG tube replaced. Has mild abn pain improved after tylenol. No N/V. No fever CP, SOB, diarrhea, change in urinary freq.        MEDICATIONS  (STANDING):  benzocaine 15 mG/menthol 3.6 mG Lozenge 1 Lozenge Oral three times a day  chlorhexidine 2% Cloths 1 Application(s) Topical daily  dextrose 5% + sodium chloride 0.45% with potassium chloride 20 mEq/L 1000 milliLiter(s) (75 mL/Hr) IV Continuous <Continuous>  dextrose 50% Injectable 12.5 Gram(s) IV Push once  dextrose 50% Injectable 25 Gram(s) IV Push once  dextrose 50% Injectable 25 Gram(s) IV Push once  enoxaparin Injectable 30 milliGRAM(s) SubCutaneous two times a day  insulin lispro (HumaLOG) corrective regimen sliding scale   SubCutaneous every 6 hours  magnesium sulfate  IVPB 2 Gram(s) IV Intermittent once  nystatin Powder 1 Application(s) Topical every 12 hours  pantoprazole  Injectable 40 milliGRAM(s) IV Push daily    MEDICATIONS  (PRN):  dextrose 40% Gel 15 Gram(s) Oral once PRN Blood Glucose LESS THAN 70 milliGRAM(s)/deciliter  enalaprilat Injectable 1.25 milliGRAM(s) IV Push every 6 hours PRN hypertension  glucagon  Injectable 1 milliGRAM(s) IntraMuscular once PRN Glucose LESS THAN 70 milligrams/deciliter    Objective    Vital Signs Last 24 Hrs  T(C): 36.8 (19 @ 10:15), Max: 37.4 (19 @ 21:27)  T(F): 98.2 (19 @ 10:15), Max: 99.3 (19 @ 21:27)  HR: 72 (19 @ 10:15) (71 - 82)  BP: 181/70 (19 @ 10:15) (125/50 - 181/70)  BP(mean): --  RR: 18 (19 @ 10:15) (17 - 18)  SpO2: 95% (19 @ 10:15) (95% - 97%)      Appearance: Sitting in bed, NAD  HEENT:  Pt missing teeth in front after dental extraction  Cardiovascular: RRR, No JVD, No murmurs, gallops or rubs appreciated  Respiratory: Lungs clear to auscultation bilaterally, no wheezes, crackles appreciated  Gastrointestinal:  Soft, nondistended, minimally tender	  Skin: No rashes, eccymosis or cyanosis noted	  Neurologic: AOx3, CNII-XII grossly intact, motor and sensory function grossly intact  Extremities: Moving all extremities equally  Vascular: 1+ pitting edema b/l  Psych:  Normal mood and affect, responds to questions appropriately                          9.1    9.38  )-----------( 256      ( 04 Sep 2019 05:34 )             32.9       09-04    140  |  105  |  6<L>  ----------------------------<  154<H>  4.4   |  26  |  0.47<L>    Ca    8.5      04 Sep 2019 05:34  Phos  2.9     09-04  Mg     1.9     09-04    Urinalysis Basic - ( 03 Sep 2019 17:20 )    Color: YELLOW / Appearance: TURBID / S.031 / pH: 8.5  Gluc: NEGATIVE / Ketone: NEGATIVE  / Bili: NEGATIVE / Urobili: 3.0   Blood: MODERATE / Protein: 50 / Nitrite: NEGATIVE   Leuk Esterase: NEGATIVE / RBC: 25-50 / WBC 2-5   Sq Epi: x / Non Sq Epi: x / Bacteria: LARGE    PT/INR - ( 04 Sep 2019 05:34 )   PT: 12.3 SEC;   INR: 1.08     PTT - ( 04 Sep 2019 05:34 )  PTT:28.3 SEC    CAPILLARY BLOOD GLUCOS  POCT Blood Glucose.: 153 mg/dL (04 Sep 2019 08:53)      Radiology & Imaging:  < from: Transthoracic Echocardiogram (19 @ 12:11) >  CONCLUSIONS:  1. Normal trileaflet aortic valve.  2. Mild concentric left ventricular hypertrophy.  3. Normal left ventricular systolic function. No segmental  wall motion abnormalities.  4. Normal right ventricular size and function.  5. Normal tricuspid valve.     Mild tricuspid  regurgitation.  6. Estimated pulmonary artery systolic pressure equals 55  mm Hg, assuming right atrial pressure equals 10  mm Hg,  consistent with moderate pulmonary hypertension.    < end of copied text >    < from: CT Abdomen and Pelvis w/ Oral Cont and w/ IV Cont (19 @ 19:38) >  FINDINGS:    LOWER CHEST: Coronary artery calcifications. Mild distal esophageal wall   thickening.    LIVER: Within normal limits.  BILE DUCTS: Normal caliber.  GALLBLADDER: Cholelithiasis.  SPLEEN: Within normal limits.  PANCREAS: Within normal limits.  ADRENALS: Nodular thickening of both adrenal mass with an additional 2.1   cm indeterminate left adrenal nodule.  KIDNEYS/URETERS: Within normal limits.    BLADDER: Within normal limits.  REPRODUCTIVE ORGANS: Uterus within normal limits. Left adnexal cystic   lesion with peripheral calcification measures 17.8 x 13.0 x 18.0 cm.     BOWEL: Small bowel obstruction with transition point at the level of an   umbilical hernia  PERITONEUM: Small fluid within the umbilical hernia sac..  VESSELS: Within normal limits.  RETROPERITONEUM/LYMPH NODES: No lymphadenopathy.    ABDOMINAL WALL: As above.  BONES: Degenerative changes.    IMPRESSION:     Small bowel obstruction with transition point at the level of an   umbilical hernia.    An 18.0 cm left adnexal cyst.    < end of copied text >      Consultant Notes Reviewed:  discussed case with general surgery team

## 2019-09-04 NOTE — PROGRESS NOTE ADULT - SUBJECTIVE AND OBJECTIVE BOX
GENERAL SURGERY PROGRESS NOTE    SUBJECTIVE: Yesterday patient had 4 teeth removed by dental. No acute events overnight. This AM patient complains of tooth pain and abdominal pain. No flatus or BM this AM or overnight. -n/-v    10-point review of systems completed and negative except as noted above.      OBJECTIVE    MEDICATIONS  benzocaine 15 mG/menthol 3.6 mG Lozenge 1 Lozenge Oral three times a day  chlorhexidine 2% Cloths 1 Application(s) Topical daily  dextrose 40% Gel 15 Gram(s) Oral once PRN  dextrose 5% + sodium chloride 0.45% with potassium chloride 20 mEq/L 1000 milliLiter(s) IV Continuous <Continuous>  dextrose 5%. 1000 milliLiter(s) IV Continuous <Continuous>  dextrose 50% Injectable 12.5 Gram(s) IV Push once  dextrose 50% Injectable 25 Gram(s) IV Push once  dextrose 50% Injectable 25 Gram(s) IV Push once  enalaprilat Injectable 1.25 milliGRAM(s) IV Push every 6 hours PRN  enoxaparin Injectable 30 milliGRAM(s) SubCutaneous two times a day  glucagon  Injectable 1 milliGRAM(s) IntraMuscular once PRN  insulin lispro (HumaLOG) corrective regimen sliding scale   SubCutaneous every 6 hours  pantoprazole  Injectable 40 milliGRAM(s) IV Push daily      PHYSICAL EXAM  Vital Signs Last 24 Hrs  T(C): 37.1 (04 Sep 2019 05:56), Max: 37.4 (03 Sep 2019 21:27)  T(F): 98.8 (04 Sep 2019 05:56), Max: 99.3 (03 Sep 2019 21:27)  HR: 79 (04 Sep 2019 05:56) (71 - 82)  BP: 141/54 (04 Sep 2019 05:56) (125/50 - 154/54)  BP(mean): --  RR: 18 (04 Sep 2019 05:56) (17 - 18)  SpO2: 96% (04 Sep 2019 05:56) (95% - 97%)    General: Appears well, NAD  Neuro: AAOx3  CHEST: breathing comfortably  CV: appears well perfused  Abdomen: obese, soft, mildly tender in epigastric region, nondistended  Extremities: Grossly symmetric    I&O's Detail    03 Sep 2019 07:01  -  04 Sep 2019 07:00  --------------------------------------------------------  IN:    dextrose 5% + sodium chloride 0.45% with potassium chloride 20 mEq/L: 800 mL  Total IN: 800 mL    OUT:    Nasoenteral Tube: 350 mL    Voided: 300 mL  Total OUT: 650 mL    Total NET: 150 mL            LABS                              09-04    140  |  105  |  6<L>  ----------------------------<  154<H>  4.4   |  26  |  0.47<L>    Ca    8.5      04 Sep 2019 05:34  Phos  2.9     09-04  Mg     1.9     09-04                            9.1    9.38  )-----------( 256      ( 04 Sep 2019 05:34 )             32.9

## 2019-09-04 NOTE — PHYSICAL THERAPY INITIAL EVALUATION ADULT - DISCHARGE DISPOSITION, PT EVAL
return to previous facility to improve functional mobility and gross LE strength/rehabilitation facility

## 2019-09-04 NOTE — CHART NOTE - NSCHARTNOTEFT_GEN_A_CORE
Pt examined for c/f increasing distension, nausea, NB emesis. Reports significant abdominal pain and discomfort. Abdomen distended but soft, TTP.   NGT was placed with immediate NB return and hooked up to suction. CXR showed NGT in the stomach. Pt made NPO/IVF except meds, sips and chips. Plan for OR Friday at 12.

## 2019-09-04 NOTE — PROVIDER CONTACT NOTE (OTHER) - ACTION/TREATMENT ORDERED:
Diet was changed to NPO except meds with ice chips and sips of water, continue to monitor patient status.

## 2019-09-04 NOTE — PROGRESS NOTE ADULT - ASSESSMENT
67 yo obese F with non-insulin dependent DM, ventral hernia, presented w/ abn pain 2/2 SBO and now pending ventral hernia repair and removal of ovarian cyst by gyn onc on 9/6. NG tube replaced after failed trial of clears. Medicine consulted for pre-op clearance:     -Patient reports no cardiac history.  Denies NGUYEN but exertion is limited due to arthritis. Unable to walk more than a couple of steps at a time without assistance 2/2 pain from arthritis (METS <4). She reports no prior surgery or reaction to anesthesia.  - Echo done on 9/1 with normal LV function, no wall motion abnormality. EKG during admission low voltage, NSR, with no ischemic changes. Documents from [facility] obtained, patient had nuclear stress test 4/19 with no EKG evidence of inducible ischemia, no inducible ST segment changes.    - Dental eval completed and loose teeth were extracted today under local anesthesia.  - c/w ISS for DM.  - RCRI score 1 and .....      Discussed with Surgery team. 67 yo obese F with non-insulin dependent DM, ventral hernia, presented w/ abn pain 2/2 SBO and now pending ventral hernia repair and removal of ovarian cyst by gyn onc on 9/6. NG tube replaced after failed trial of clears. Medicine consulted for pre-op clearance:     -Patient reports no cardiac history.  Denies NGUYEN but exertion is limited due to arthritis. Unable to walk more than a couple of steps at a time without assistance 2/2 pain from arthritis (METS <4). She reports no prior surgery or reaction to anesthesia.  - Echo done on 9/1 with normal LV function, no wall motion abnormality. EKG during admission low voltage, NSR, with no ischemic changes. Documents from [facility] obtained, patient had nuclear stress test 4/19 with no EKG evidence of inducible ischemia, no inducible ST segment changes.    - Dental eval completed and loose teeth were extracted on 9/3  - c/w ISS for DM.  - RCRI score 1 and .....    Discussed with Surgery team. 69 yo obese F with non-insulin dependent DM, ventral hernia, presented w/ abn pain 2/2 SBO and now pending ventral hernia repair and removal of ovarian cyst by gyn onc on 9/6. NG tube replaced after failed trial of clears. Medicine consulted for pre-op clearance:     - Dental eval completed and loose teeth were extracted on 9/3  - c/w ISS for DM.  -Patient reports no cardiac history.  Denies NGUYEN but exertion is limited due to arthritis. Unable to walk more than a couple of steps at a time without assistance 2/2 pain from arthritis (METS <4). She reports no prior surgery or reaction to anesthesia.  - Echo done on 9/1 with normal LV function, no wall motion abnormality. EKG during admission low voltage, NSR, with no ischemic changes. -Documents from [facility] obtained, patient had nuclear stress test 4/19 with stress EKG showing no evidence of inducible ischemia, no inducible ST segment changes. However, nuclear imaging showed mildly decreased tracer uptake in the apical lateral, apical inferior, mid anterolateral, mid inferolateral, and basal inferolateral segments on stress images that improved on the rest images (results in chart). Cardiology consulted to review nuclear stress results and for further optimization recommendations.     Discussed with Surgery team. Medicine team will sign off.

## 2019-09-05 ENCOUNTER — TRANSCRIPTION ENCOUNTER (OUTPATIENT)
Age: 69
End: 2019-09-05

## 2019-09-05 LAB
ANION GAP SERPL CALC-SCNC: 6 MMO/L — LOW (ref 7–14)
APTT BLD: 28.6 SEC — SIGNIFICANT CHANGE UP (ref 27.5–36.3)
BUN SERPL-MCNC: 6 MG/DL — LOW (ref 7–23)
CALCIUM SERPL-MCNC: 8.7 MG/DL — SIGNIFICANT CHANGE UP (ref 8.4–10.5)
CHLORIDE SERPL-SCNC: 104 MMOL/L — SIGNIFICANT CHANGE UP (ref 98–107)
CO2 SERPL-SCNC: 28 MMOL/L — SIGNIFICANT CHANGE UP (ref 22–31)
CREAT SERPL-MCNC: 0.51 MG/DL — SIGNIFICANT CHANGE UP (ref 0.5–1.3)
GLUCOSE BLDC GLUCOMTR-MCNC: 113 MG/DL — HIGH (ref 70–99)
GLUCOSE BLDC GLUCOMTR-MCNC: 120 MG/DL — HIGH (ref 70–99)
GLUCOSE BLDC GLUCOMTR-MCNC: 122 MG/DL — HIGH (ref 70–99)
GLUCOSE BLDC GLUCOMTR-MCNC: 97 MG/DL — SIGNIFICANT CHANGE UP (ref 70–99)
GLUCOSE SERPL-MCNC: 122 MG/DL — HIGH (ref 70–99)
HCT VFR BLD CALC: 30.3 % — LOW (ref 34.5–45)
HGB BLD-MCNC: 8.8 G/DL — LOW (ref 11.5–15.5)
INR BLD: 1.14 — SIGNIFICANT CHANGE UP (ref 0.88–1.17)
MAGNESIUM SERPL-MCNC: 1.9 MG/DL — SIGNIFICANT CHANGE UP (ref 1.6–2.6)
MCHC RBC-ENTMCNC: 23.6 PG — LOW (ref 27–34)
MCHC RBC-ENTMCNC: 29 % — LOW (ref 32–36)
MCV RBC AUTO: 81.2 FL — SIGNIFICANT CHANGE UP (ref 80–100)
NRBC # FLD: 0.02 K/UL — SIGNIFICANT CHANGE UP (ref 0–0)
PHOSPHATE SERPL-MCNC: 2.6 MG/DL — SIGNIFICANT CHANGE UP (ref 2.5–4.5)
PLATELET # BLD AUTO: 285 K/UL — SIGNIFICANT CHANGE UP (ref 150–400)
PMV BLD: 10.4 FL — SIGNIFICANT CHANGE UP (ref 7–13)
POTASSIUM SERPL-MCNC: 4.3 MMOL/L — SIGNIFICANT CHANGE UP (ref 3.5–5.3)
POTASSIUM SERPL-SCNC: 4.3 MMOL/L — SIGNIFICANT CHANGE UP (ref 3.5–5.3)
PROTHROM AB SERPL-ACNC: 12.7 SEC — SIGNIFICANT CHANGE UP (ref 9.8–13.1)
RBC # BLD: 3.73 M/UL — LOW (ref 3.8–5.2)
RBC # FLD: 15.6 % — HIGH (ref 10.3–14.5)
SODIUM SERPL-SCNC: 138 MMOL/L — SIGNIFICANT CHANGE UP (ref 135–145)
WBC # BLD: 10.08 K/UL — SIGNIFICANT CHANGE UP (ref 3.8–10.5)
WBC # FLD AUTO: 10.08 K/UL — SIGNIFICANT CHANGE UP (ref 3.8–10.5)

## 2019-09-05 PROCEDURE — 93458 L HRT ARTERY/VENTRICLE ANGIO: CPT | Mod: 26

## 2019-09-05 PROCEDURE — 99232 SBSQ HOSP IP/OBS MODERATE 35: CPT | Mod: GC,57

## 2019-09-05 RX ORDER — ACETAMINOPHEN 500 MG
1000 TABLET ORAL ONCE
Refills: 0 | Status: COMPLETED | OUTPATIENT
Start: 2019-09-05 | End: 2019-09-05

## 2019-09-05 RX ORDER — MAGNESIUM SULFATE 500 MG/ML
2 VIAL (ML) INJECTION ONCE
Refills: 0 | Status: COMPLETED | OUTPATIENT
Start: 2019-09-05 | End: 2019-09-05

## 2019-09-05 RX ADMIN — Medication 400 MILLIGRAM(S): at 21:23

## 2019-09-05 RX ADMIN — ENOXAPARIN SODIUM 30 MILLIGRAM(S): 100 INJECTION SUBCUTANEOUS at 05:51

## 2019-09-05 RX ADMIN — Medication 1.25 MILLIGRAM(S): at 05:56

## 2019-09-05 RX ADMIN — Medication 1000 MILLIGRAM(S): at 21:39

## 2019-09-05 RX ADMIN — DEXTROSE MONOHYDRATE, SODIUM CHLORIDE, AND POTASSIUM CHLORIDE 75 MILLILITER(S): 50; .745; 4.5 INJECTION, SOLUTION INTRAVENOUS at 21:24

## 2019-09-05 RX ADMIN — Medication 50 GRAM(S): at 09:03

## 2019-09-05 RX ADMIN — ENOXAPARIN SODIUM 30 MILLIGRAM(S): 100 INJECTION SUBCUTANEOUS at 19:55

## 2019-09-05 RX ADMIN — PANTOPRAZOLE SODIUM 40 MILLIGRAM(S): 20 TABLET, DELAYED RELEASE ORAL at 14:14

## 2019-09-05 RX ADMIN — NYSTATIN CREAM 1 APPLICATION(S): 100000 CREAM TOPICAL at 19:54

## 2019-09-05 RX ADMIN — NYSTATIN CREAM 1 APPLICATION(S): 100000 CREAM TOPICAL at 05:51

## 2019-09-05 NOTE — PROGRESS NOTE ADULT - ASSESSMENT
Assessment:  Pt is a 68F presenting with epigastric abdominal pain, with CT showing ventral hernia with small bowel and fluid in hernia sac and a large ovarian cyst 10cm with calcification. Doing well with return of bowel function. Plan for potential OR Friday (noon) in conjunction with GYNONC for removal of ovarian cyst and ventral hernia repair.     Plan:  - s/p dental assessment and tooth extraction  - OR book for 9/6 with gyn onc  - NGT replaced yesterday   - Continue to monitor GI function  - replete lytes PRN  - Medical clearance for OR.   - PT: rehab facility, case management aware and patient amenable pending operative plan  - dvt ppx to BID LVX 30mg sq        B Team Surgery  [id number] Assessment:  Pt is a 68F presenting with epigastric abdominal pain, with CT showing ventral hernia with small bowel and fluid in hernia sac and a large ovarian cyst 10cm with calcification. Doing well with return of bowel function. Plan for potential OR Friday (noon) in conjunction with GYNONC for removal of ovarian cyst and ventral hernia repair.     Plan:  - s/p dental assessment and tooth extraction  - Per medicine, spoke to Dr. Belcher (cards) about borderline echo results, will f/u cardiology. Otherwise medically cleared pending cards eval, medicine signed off  - OR book for 9/6 with gyn onc, Dr. Rivero if case before noon, Dr. Ahn if case in afternoon  - Continue to monitor GI function  - NGT/NPO/IVF except meds, sips and chips  - replete lytes PRN  - Medical clearance for OR.   - PT: rehab facility, case management aware and patient amenable pending operative plan  - dvt ppx to BID LVX 30mg sq        B Team Surgery  [id number]

## 2019-09-05 NOTE — CHART NOTE - NSCHARTNOTEFT_GEN_A_CORE
Post Procedure Check  Pt is s/p cardiac catheterization, results showing no CAD    SUBJECTIVE: No acute events in the immediate post-procedure period. Patient complaining of abdominal and stomach pain. No right wrist pain, numbness or tingling in the fingers.     OBJECTIVE:  T(C): 36.6 (19 @ 19:32), Max: 36.7 (19 @ 22:23)  HR: 86 (19 @ 19:32) (79 - 87)  BP: 157/63 (19 @ 19:32) (144/57 - 157/63)  RR: 20 (19 @ 19:32) (16 - 20)  SpO2: 97% (19 @ 19:32) (95% - 97%)      19 @ 07:01  -  19 @ 07:00  --------------------------------------------------------  IN: 0 mL / OUT: 2300 mL / NET: -2300 mL    19 @ 07:01  -  19 @ 21:55  --------------------------------------------------------  IN: 300 mL / OUT: 1100 mL / NET: -800 mL        Physical Exam:   - Constitutional: AOx3, NAD  - CV: normotensive, regular rate   - Respiratory: nonlabored  - Abdomen: soft, tender to palpation, distended, soft and compressible  - Extremities: WWP, right wrist without hematoma, dressing c/d/i  - Vascular: distal pusles 2+  - Neurological: no focal deficits    REPORT:  Patient: CAS CASSIDY  Study date: 2019  Account number: [id number]  MR number: [id number]  : 1950  Gender: Female  Race: B  Case Physician(s):  Benjamin Slater M.D.  Fellow:  Nato Capps M.D.  Referring Physician:  Abdifatah Belcher M.D.  INDICATIONS: Abnormal stress test. Pre-operative cardiac evaluation.  HISTORY: SBO. There was no prior cardiac history. The patient has  hypertension, oral hypoglycemic-treated diabetes, medication-treated  dyslipidemia, and morbid obesity.  PROCEDURE:  --  Left heart catheterization.  --  Right coronary angiography.  --  Left coronary angiography.  TECHNIQUE: The risks and alternatives of the procedures and conscious  sedation were explained to the patient and informed consent was obtained.  Cardiac catheterization performed electively.  Right radial artery access. Local anesthetic given. The puncture site was  infiltrated with 2 % lidocaine. A 5 Fr. Radial Glidesheath Slender was  inserted in the vessel. Left heart catheterization. A 5fr FR 4 Expo  catheter was utilized. After recording ascending aortic pressure, the  catheter was advanced across the aortic valve and left ventricular  pressure was recorded. Right coronary artery angiography. The vessel was  injected utilizing a 5fr FR 4 Expo catheter. Left coronary artery  angiography. The vessel was injected utilizing a 5fr FL 3.5 Expo catheter.  RADIATION EXPOSURE: 13.2 min.  CONTRAST GIVEN: 77 ml Optiray.  MEDICATIONS GIVEN: Verapamil (Isoptin, Calan, Covera), 2.5 mg, IA. Heparin,  4000 units, IV. 2% Lidocaine, 3 ml, subcutaneously. 0.9% Normal saline, 20  ml, IV.  VENTRICLES: No left ventriculogram was performed.  CORONARY VESSELS: The coronary circulation is right dominant.  LM:   --  LM: Normal.  LAD:   --  LAD: Normal.  CX:   --  Circumflex: Normal.  RCA:   --  RCA: Normal. 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  COMPLICATIONS: No complications occurred during the cath lab visit.  DIAGNOSTIC RECOMMENDATIONS: Medical management is recommended.      ASSESSMENT:   CAS CASSIDY is a 68y Female s/p cardiac catheterization, stable after procedure which showed normal coronary arteries. Plan for OR tomorrow for ventral hernia repair. Patient consented, preop preparation complete.     PLAN:  - IV tylenol x1 dose administered for pain  - pain management: no pain medications without abdominal exam  - NPO/mIVF/NGT  - morning labs before OR

## 2019-09-05 NOTE — PROGRESS NOTE ADULT - SUBJECTIVE AND OBJECTIVE BOX
GENERAL SURGERY PROGRESS NOTE    SUBJECTIVE: NGT replaced yesterday. No acute events overnight. Patient reports flatus this am. No BM.    10-point review of systems completed and negative except as noted above.      OBJECTIVE    MEDICATIONS  (STANDING):  benzocaine 15 mG/menthol 3.6 mG Lozenge 1 Lozenge Oral three times a day  chlorhexidine 2% Cloths 1 Application(s) Topical daily  dextrose 5% + sodium chloride 0.45% with potassium chloride 20 mEq/L 1000 milliLiter(s) (75 mL/Hr) IV Continuous <Continuous>  dextrose 50% Injectable 12.5 Gram(s) IV Push once  dextrose 50% Injectable 25 Gram(s) IV Push once  dextrose 50% Injectable 25 Gram(s) IV Push once  enoxaparin Injectable 30 milliGRAM(s) SubCutaneous two times a day  insulin lispro (HumaLOG) corrective regimen sliding scale   SubCutaneous every 6 hours  nystatin Powder 1 Application(s) Topical every 12 hours  pantoprazole  Injectable 40 milliGRAM(s) IV Push daily      PHYSICAL EXAM  Vital Signs Last 24 Hrs  T(C): 36.4 (05 Sep 2019 05:49), Max: 37 (04 Sep 2019 17:44)  T(F): 97.5 (05 Sep 2019 05:49), Max: 98.6 (04 Sep 2019 17:44)  HR: 79 (05 Sep 2019 05:49) (61 - 87)  BP: 155/64 (05 Sep 2019 05:49) (148/62 - 181/70)  BP(mean): --  RR: 16 (05 Sep 2019 05:49) (16 - 18)  SpO2: 97% (05 Sep 2019 05:49) (95% - 99%)    I&O's Summary    04 Sep 2019 07:01  -  05 Sep 2019 07:00  --------------------------------------------------------  IN: 0 mL / OUT: 2300 mL / NET: -2300 mL      General: Appears well, NAD  Neuro: AAOx3  CHEST: breathing comfortably  CV: appears well perfused  Abdomen: obese, soft, mildly tender in epigastric region, nondistended  Extremities: Grossly symmetric      LABS                          8.8    10.08 )-----------( 285      ( 05 Sep 2019 06:00 )             30.3     09-05    138  |  104  |  6<L>  ----------------------------<  122<H>  4.3   |  28  |  0.51    Ca    8.7      05 Sep 2019 06:00  Phos  2.6     09-05  Mg     1.9     09-05 GENERAL SURGERY PROGRESS NOTE    SUBJECTIVE: NGT replaced yesterday. No acute events overnight. Patient reports flatus this am. No BM. Otherwise feeling well.    10-point review of systems completed and negative except as noted above.      OBJECTIVE    MEDICATIONS  (STANDING):  benzocaine 15 mG/menthol 3.6 mG Lozenge 1 Lozenge Oral three times a day  chlorhexidine 2% Cloths 1 Application(s) Topical daily  dextrose 5% + sodium chloride 0.45% with potassium chloride 20 mEq/L 1000 milliLiter(s) (75 mL/Hr) IV Continuous <Continuous>  dextrose 50% Injectable 12.5 Gram(s) IV Push once  dextrose 50% Injectable 25 Gram(s) IV Push once  dextrose 50% Injectable 25 Gram(s) IV Push once  enoxaparin Injectable 30 milliGRAM(s) SubCutaneous two times a day  insulin lispro (HumaLOG) corrective regimen sliding scale   SubCutaneous every 6 hours  nystatin Powder 1 Application(s) Topical every 12 hours  pantoprazole  Injectable 40 milliGRAM(s) IV Push daily      PHYSICAL EXAM  Vital Signs Last 24 Hrs  T(C): 36.4 (05 Sep 2019 05:49), Max: 37 (04 Sep 2019 17:44)  T(F): 97.5 (05 Sep 2019 05:49), Max: 98.6 (04 Sep 2019 17:44)  HR: 79 (05 Sep 2019 05:49) (61 - 87)  BP: 155/64 (05 Sep 2019 05:49) (148/62 - 181/70)  BP(mean): --  RR: 16 (05 Sep 2019 05:49) (16 - 18)  SpO2: 97% (05 Sep 2019 05:49) (95% - 99%)    I&O's Summary    04 Sep 2019 07:01  -  05 Sep 2019 07:00  --------------------------------------------------------  IN: 0 mL / OUT: 2300 mL / NET: -2300 mL      General: Appears well, NAD  Neuro: AAOx3  CHEST: breathing comfortably  CV: appears well perfused  Abdomen: obese, soft, mildly tender in epigastric region, nondistended  Extremities: Grossly symmetric      LABS                          8.8    10.08 )-----------( 285      ( 05 Sep 2019 06:00 )             30.3     09-05    138  |  104  |  6<L>  ----------------------------<  122<H>  4.3   |  28  |  0.51    Ca    8.7      05 Sep 2019 06:00  Phos  2.6     09-05  Mg     1.9     09-05

## 2019-09-05 NOTE — CONSULT NOTE ADULT - SUBJECTIVE AND OBJECTIVE BOX
CHIEF COMPLAINT:Patient is a 68y old  Female who presents with a chief complaint of abn pain, Small Bowel Obstruction (04 Sep 2019 12:43)      HISTORY OF PRESENT ILLNESS:  This is a 68 year old femae with DM, CAD based on stress test  admitted with abd pain   incarcerated ventral hernia with SBO   planned for surgery   cardiology is called for pre op eval  pt denies any chest pain or sob   exercise capacity is LESS than 4 METs.       PAST MEDICAL & SURGICAL HISTORY:  SBO (small bowel obstruction)  CAD (coronary artery disease)  DM (diabetes mellitus)  HTN (hypertension)  No significant past surgical history          MEDICATIONS:  enalaprilat Injectable 1.25 milliGRAM(s) IV Push every 6 hours PRN  enoxaparin Injectable 30 milliGRAM(s) SubCutaneous two times a day          pantoprazole  Injectable 40 milliGRAM(s) IV Push daily    dextrose 40% Gel 15 Gram(s) Oral once PRN  dextrose 50% Injectable 12.5 Gram(s) IV Push once  dextrose 50% Injectable 25 Gram(s) IV Push once  dextrose 50% Injectable 25 Gram(s) IV Push once  glucagon  Injectable 1 milliGRAM(s) IntraMuscular once PRN  insulin lispro (HumaLOG) corrective regimen sliding scale   SubCutaneous every 6 hours    benzocaine 15 mG/menthol 3.6 mG Lozenge 1 Lozenge Oral three times a day  chlorhexidine 2% Cloths 1 Application(s) Topical daily  dextrose 5% + sodium chloride 0.45% with potassium chloride 20 mEq/L 1000 milliLiter(s) IV Continuous <Continuous>  nystatin Powder 1 Application(s) Topical every 12 hours      FAMILY HISTORY:      Non-contributory    SOCIAL HISTORY:    No tobacco, drugs or etoh    Allergies    No Known Allergies    Intolerances    	    REVIEW OF SYSTEMS:  as above  The rest of the 14 points ROS reviewed and except above they are unremarkable.        PHYSICAL EXAM:  T(C): 36.4 (09-05-19 @ 05:49), Max: 37 (09-04-19 @ 17:44)  HR: 79 (09-05-19 @ 05:49) (61 - 87)  BP: 155/64 (09-05-19 @ 05:49) (148/62 - 181/70)  RR: 16 (09-05-19 @ 05:49) (16 - 18)  SpO2: 97% (09-05-19 @ 05:49) (95% - 99%)  Wt(kg): --  I&O's Summary    04 Sep 2019 07:01  -  05 Sep 2019 07:00  --------------------------------------------------------  IN: 0 mL / OUT: 2300 mL / NET: -2300 mL      JVP: Normal  Neck: supple  Lung: clear   CV: S1 S2 , Murmur:  Abd: soft  Ext: No edema  neuro: Awake / alert  Psych: flat affect  Skin: normal    LABS/DATA:    TELEMETRY: 	    ECG:  	   	  CARDIAC MARKERS:      < from: Transthoracic Echocardiogram (09.01.19 @ 12:11) >  CONCLUSIONS:  1. Normal trileaflet aortic valve.  2. Mild concentric left ventricular hypertrophy.  3. Normal left ventricular systolic function. No segmental  wall motion abnormalities.  4. Normal right ventricular size and function.  5. Normal tricuspid valve.     Mild tricuspid  regurgitation.  6. Estimated pulmonary artery systolic pressure equals 55  mm Hg, assuming right atrial pressure equals 10  mm Hg,  consistent with moderate pulmonary hypertension.  *** No previous Echo exam.  ------------------------------------------------------------------------  Confirmed on  9/1/2019 - 13:27:41 by Teresa Echavarria MD    < end of copied text >                                  8.8    10.08 )-----------( 285      ( 05 Sep 2019 06:00 )             30.3     09-05    138  |  104  |  6<L>  ----------------------------<  122<H>  4.3   |  28  |  0.51    Ca    8.7      05 Sep 2019 06:00  Phos  2.6     09-05  Mg     1.9     09-05      proBNP:   Lipid Profile:   HgA1c:   TSH:

## 2019-09-05 NOTE — CONSULT NOTE ADULT - ASSESSMENT
DM  Monitor finger stick. Insulin coverage. Diabetic education and Diabetic diet. Consider nutrition consultation.    HTN  cont IV vasotec for now    PreOp  Based on current ACC/AHA guidelines, patient history and physical exam, the patient is considered to have elevated risk  She has poor ET  I reviewed stress report from [facility] ( no images available )   there are LARGE area of ischemic myocardium   will plan for cath today to make sure she does not have malignant coronary anatomy

## 2019-09-05 NOTE — CONSULT NOTE ADULT - CONSULT REASON
aspiration risk for intubation
Pre-Operative Cardiac Risk Stratification and Optimization
adnexal mass
pre-operative medical optimization.

## 2019-09-06 ENCOUNTER — RESULT REVIEW (OUTPATIENT)
Age: 69
End: 2019-09-06

## 2019-09-06 LAB
ALBUMIN SERPL ELPH-MCNC: 3 G/DL — LOW (ref 3.3–5)
ALP SERPL-CCNC: 103 U/L — SIGNIFICANT CHANGE UP (ref 40–120)
ALT FLD-CCNC: 8 U/L — SIGNIFICANT CHANGE UP (ref 4–33)
ANION GAP SERPL CALC-SCNC: 9 MMO/L — SIGNIFICANT CHANGE UP (ref 7–14)
APTT BLD: 29.9 SEC — SIGNIFICANT CHANGE UP (ref 27.5–36.3)
AST SERPL-CCNC: 10 U/L — SIGNIFICANT CHANGE UP (ref 4–32)
BASE EXCESS BLDA CALC-SCNC: 2.3 MMOL/L — SIGNIFICANT CHANGE UP
BILIRUB SERPL-MCNC: 0.3 MG/DL — SIGNIFICANT CHANGE UP (ref 0.2–1.2)
BUN SERPL-MCNC: 5 MG/DL — LOW (ref 7–23)
CA-I BLDA-SCNC: 1.16 MMOL/L — SIGNIFICANT CHANGE UP (ref 1.15–1.29)
CALCIUM SERPL-MCNC: 8.8 MG/DL — SIGNIFICANT CHANGE UP (ref 8.4–10.5)
CHLORIDE SERPL-SCNC: 103 MMOL/L — SIGNIFICANT CHANGE UP (ref 98–107)
CO2 SERPL-SCNC: 28 MMOL/L — SIGNIFICANT CHANGE UP (ref 22–31)
CREAT SERPL-MCNC: 0.5 MG/DL — SIGNIFICANT CHANGE UP (ref 0.5–1.3)
GLUCOSE BLDA-MCNC: 147 MG/DL — HIGH (ref 70–99)
GLUCOSE BLDC GLUCOMTR-MCNC: 131 MG/DL — HIGH (ref 70–99)
GLUCOSE BLDC GLUCOMTR-MCNC: 132 MG/DL — HIGH (ref 70–99)
GLUCOSE BLDC GLUCOMTR-MCNC: 133 MG/DL — HIGH (ref 70–99)
GLUCOSE BLDC GLUCOMTR-MCNC: 167 MG/DL — HIGH (ref 70–99)
GLUCOSE SERPL-MCNC: 133 MG/DL — HIGH (ref 70–99)
HCO3 BLDA-SCNC: 27 MMOL/L — HIGH (ref 22–26)
HCT VFR BLDA CALC: 29.5 % — LOW (ref 34.5–46.5)
HGB BLDA-MCNC: 9.5 G/DL — LOW (ref 11.5–15.5)
INR BLD: 1.08 — SIGNIFICANT CHANGE UP (ref 0.88–1.17)
MAGNESIUM SERPL-MCNC: 1.9 MG/DL — SIGNIFICANT CHANGE UP (ref 1.6–2.6)
PCO2 BLDA: 32 MMHG — SIGNIFICANT CHANGE UP (ref 32–48)
PH BLDA: 7.51 PH — HIGH (ref 7.35–7.45)
PHOSPHATE SERPL-MCNC: 2.9 MG/DL — SIGNIFICANT CHANGE UP (ref 2.5–4.5)
PO2 BLDA: 203 MMHG — HIGH (ref 83–108)
POTASSIUM BLDA-SCNC: 3.9 MMOL/L — SIGNIFICANT CHANGE UP (ref 3.4–4.5)
POTASSIUM SERPL-MCNC: 4 MMOL/L — SIGNIFICANT CHANGE UP (ref 3.5–5.3)
POTASSIUM SERPL-SCNC: 4 MMOL/L — SIGNIFICANT CHANGE UP (ref 3.5–5.3)
PROT SERPL-MCNC: 6.4 G/DL — SIGNIFICANT CHANGE UP (ref 6–8.3)
PROTHROM AB SERPL-ACNC: 12.4 SEC — SIGNIFICANT CHANGE UP (ref 9.8–13.1)
SAO2 % BLDA: 99.5 % — HIGH (ref 95–99)
SODIUM BLDA-SCNC: 137 MMOL/L — SIGNIFICANT CHANGE UP (ref 136–146)
SODIUM SERPL-SCNC: 140 MMOL/L — SIGNIFICANT CHANGE UP (ref 135–145)

## 2019-09-06 PROCEDURE — 88307 TISSUE EXAM BY PATHOLOGIST: CPT | Mod: 26

## 2019-09-06 PROCEDURE — 88302 TISSUE EXAM BY PATHOLOGIST: CPT | Mod: 26

## 2019-09-06 PROCEDURE — 49561: CPT | Mod: GC,22

## 2019-09-06 PROCEDURE — 88331 PATH CONSLTJ SURG 1 BLK 1SPC: CPT | Mod: 26

## 2019-09-06 PROCEDURE — 58720 REMOVAL OF OVARY/TUBE(S): CPT | Mod: 22

## 2019-09-06 RX ORDER — ACETAMINOPHEN 500 MG
1000 TABLET ORAL EVERY 6 HOURS
Refills: 0 | Status: COMPLETED | OUTPATIENT
Start: 2019-09-06 | End: 2019-09-07

## 2019-09-06 RX ORDER — MAGNESIUM SULFATE 500 MG/ML
2 VIAL (ML) INJECTION ONCE
Refills: 0 | Status: COMPLETED | OUTPATIENT
Start: 2019-09-06 | End: 2019-09-06

## 2019-09-06 RX ORDER — HYDROMORPHONE HYDROCHLORIDE 2 MG/ML
0.5 INJECTION INTRAMUSCULAR; INTRAVENOUS; SUBCUTANEOUS
Refills: 0 | Status: DISCONTINUED | OUTPATIENT
Start: 2019-09-06 | End: 2019-09-06

## 2019-09-06 RX ORDER — ONDANSETRON 8 MG/1
8 TABLET, FILM COATED ORAL ONCE
Refills: 0 | Status: DISCONTINUED | OUTPATIENT
Start: 2019-09-06 | End: 2019-09-06

## 2019-09-06 RX ORDER — FENTANYL CITRATE 50 UG/ML
25 INJECTION INTRAVENOUS
Refills: 0 | Status: DISCONTINUED | OUTPATIENT
Start: 2019-09-06 | End: 2019-09-06

## 2019-09-06 RX ORDER — HYDROMORPHONE HYDROCHLORIDE 2 MG/ML
0.5 INJECTION INTRAMUSCULAR; INTRAVENOUS; SUBCUTANEOUS EVERY 4 HOURS
Refills: 0 | Status: DISCONTINUED | OUTPATIENT
Start: 2019-09-06 | End: 2019-09-08

## 2019-09-06 RX ORDER — KETOROLAC TROMETHAMINE 30 MG/ML
15 SYRINGE (ML) INJECTION EVERY 6 HOURS
Refills: 0 | Status: DISCONTINUED | OUTPATIENT
Start: 2019-09-06 | End: 2019-09-08

## 2019-09-06 RX ORDER — SODIUM CHLORIDE 9 MG/ML
1000 INJECTION, SOLUTION INTRAVENOUS
Refills: 0 | Status: DISCONTINUED | OUTPATIENT
Start: 2019-09-06 | End: 2019-09-07

## 2019-09-06 RX ADMIN — HYDROMORPHONE HYDROCHLORIDE 0.5 MILLIGRAM(S): 2 INJECTION INTRAMUSCULAR; INTRAVENOUS; SUBCUTANEOUS at 17:58

## 2019-09-06 RX ADMIN — Medication 15 MILLIGRAM(S): at 23:11

## 2019-09-06 RX ADMIN — Medication 15 MILLIGRAM(S): at 17:56

## 2019-09-06 RX ADMIN — Medication 50 GRAM(S): at 09:31

## 2019-09-06 RX ADMIN — Medication 15 MILLIGRAM(S): at 23:26

## 2019-09-06 RX ADMIN — SODIUM CHLORIDE 100 MILLILITER(S): 9 INJECTION, SOLUTION INTRAVENOUS at 22:52

## 2019-09-06 RX ADMIN — ENOXAPARIN SODIUM 30 MILLIGRAM(S): 100 INJECTION SUBCUTANEOUS at 06:17

## 2019-09-06 RX ADMIN — ENOXAPARIN SODIUM 30 MILLIGRAM(S): 100 INJECTION SUBCUTANEOUS at 21:42

## 2019-09-06 RX ADMIN — HYDROMORPHONE HYDROCHLORIDE 0.5 MILLIGRAM(S): 2 INJECTION INTRAMUSCULAR; INTRAVENOUS; SUBCUTANEOUS at 16:43

## 2019-09-06 RX ADMIN — Medication 1.25 MILLIGRAM(S): at 06:17

## 2019-09-06 RX ADMIN — HYDROMORPHONE HYDROCHLORIDE 0.5 MILLIGRAM(S): 2 INJECTION INTRAMUSCULAR; INTRAVENOUS; SUBCUTANEOUS at 17:00

## 2019-09-06 RX ADMIN — HYDROMORPHONE HYDROCHLORIDE 0.5 MILLIGRAM(S): 2 INJECTION INTRAMUSCULAR; INTRAVENOUS; SUBCUTANEOUS at 18:15

## 2019-09-06 RX ADMIN — NYSTATIN CREAM 1 APPLICATION(S): 100000 CREAM TOPICAL at 06:18

## 2019-09-06 RX ADMIN — Medication 400 MILLIGRAM(S): at 20:04

## 2019-09-06 RX ADMIN — DEXTROSE MONOHYDRATE, SODIUM CHLORIDE, AND POTASSIUM CHLORIDE 75 MILLILITER(S): 50; .745; 4.5 INJECTION, SOLUTION INTRAVENOUS at 06:18

## 2019-09-06 RX ADMIN — Medication 15 MILLIGRAM(S): at 18:15

## 2019-09-06 RX ADMIN — BENZOCAINE AND MENTHOL 1 LOZENGE: 5; 1 LIQUID ORAL at 06:18

## 2019-09-06 RX ADMIN — Medication 1: at 23:40

## 2019-09-06 NOTE — PROGRESS NOTE ADULT - SUBJECTIVE AND OBJECTIVE BOX
HD# 7    Patient seen and examined at bedside. During her hospital course patient underwent a dental extraction and cardiac cath for pre-op clearance. Per Cards and surgery notes, patient is cleared for the OR today with gen surgery and gyn onc.    No acute overnight events. No acute complaints, abdominal pain greatly improved. She is passing flatus. Voiding with primafit. NPO with NGT. Denies CP, SOB, N/V, fevers, and chills.    Vital Signs Last 24 Hours  T(C): 36.5 (09-06-19 @ 06:13), Max: 36.7 (09-05-19 @ 22:03)  HR: 74 (09-06-19 @ 06:13) (74 - 86)  BP: 153/75 (09-06-19 @ 06:13) (144/57 - 157/63)  RR: 18 (09-06-19 @ 06:13) (18 - 20)  SpO2: 98% (09-06-19 @ 06:13) (91% - 98%)    I&O's Summary    04 Sep 2019 07:01  -  05 Sep 2019 07:00  --------------------------------------------------------  IN: 0 mL / OUT: 2300 mL / NET: -2300 mL    05 Sep 2019 07:01  -  06 Sep 2019 06:53  --------------------------------------------------------  IN: 300 mL / OUT: 2000 mL / NET: -1700 mL        Physical Exam:  General: AOx3, NAD  CV: RRR  Lungs: CTAB  Abdomen: Soft, non-tender, non-distended, Obese. +BS  Ext: No pain or swelling    Labs:                        8.8    10.08 )-----------( 285      ( 05 Sep 2019 06:00 )             30.3   baso x      eos x      imm gran x      lymph x      mono x      poly x                            9.1    9.38  )-----------( 256      ( 04 Sep 2019 05:34 )             32.9   baso 0.2    eos 4.1    imm gran 1.4    lymph 24.6   mono 7.2    poly 62.5                         9.5    11.89 )-----------( 236      ( 03 Sep 2019 07:15 )             33.1   baso x      eos x      imm gran x      lymph x      mono x      poly x          MEDICATIONS  (STANDING):  benzocaine 15 mG/menthol 3.6 mG Lozenge 1 Lozenge Oral three times a day  chlorhexidine 2% Cloths 1 Application(s) Topical daily  dextrose 5% + sodium chloride 0.45% with potassium chloride 20 mEq/L 1000 milliLiter(s) (75 mL/Hr) IV Continuous <Continuous>  dextrose 50% Injectable 12.5 Gram(s) IV Push once  dextrose 50% Injectable 25 Gram(s) IV Push once  dextrose 50% Injectable 25 Gram(s) IV Push once  enoxaparin Injectable 30 milliGRAM(s) SubCutaneous two times a day  insulin lispro (HumaLOG) corrective regimen sliding scale   SubCutaneous every 6 hours  nystatin Powder 1 Application(s) Topical every 12 hours  pantoprazole  Injectable 40 milliGRAM(s) IV Push daily    MEDICATIONS  (PRN):  dextrose 40% Gel 15 Gram(s) Oral once PRN Blood Glucose LESS THAN 70 milliGRAM(s)/deciliter  enalaprilat Injectable 1.25 milliGRAM(s) IV Push every 6 hours PRN hypertension  glucagon  Injectable 1 milliGRAM(s) IntraMuscular once PRN Glucose LESS THAN 70 milligrams/deciliter      A/P:     Neuro: Continue po pain meds  CV: Hemodynamically stable  Pulm: Saturating well on room air, encourage oob/amb  GI: Advance as tolerated  : UOP adequate, voiding freely  Heme: c/w HSQ and SCDs for DVT ppx  Dispo: Continue routine post-op care    Jessica Metcalf MD HD# 7    Patient seen and examined at bedside. During her hospital course patient underwent a dental extraction and cardiac cath for pre-op clearance. Per Cards and surgery notes, patient is cleared for the OR today with gen surgery and gyn onc.    No acute overnight events. No acute complaints, abdominal pain greatly improved. She is passing flatus. Voiding with primafit. NPO with NGT. Denies CP, SOB, N/V, fevers, and chills.    Vital Signs Last 24 Hours  T(C): 36.5 (09-06-19 @ 06:13), Max: 36.7 (09-05-19 @ 22:03)  HR: 74 (09-06-19 @ 06:13) (74 - 86)  BP: 153/75 (09-06-19 @ 06:13) (144/57 - 157/63)  RR: 18 (09-06-19 @ 06:13) (18 - 20)  SpO2: 98% (09-06-19 @ 06:13) (91% - 98%)    I&O's Summary    04 Sep 2019 07:01  -  05 Sep 2019 07:00  --------------------------------------------------------  IN: 0 mL / OUT: 2300 mL / NET: -2300 mL    05 Sep 2019 07:01  -  06 Sep 2019 06:53  --------------------------------------------------------  IN: 300 mL / OUT: 2000 mL / NET: -1700 mL        Physical Exam:  General: AOx3, NAD  CV: RRR  Lungs: CTAB  Abdomen: Soft, non-tender, non-distended, Obese. +BS  Ext: No pain or swelling    Labs:                        8.8    10.08 )-----------( 285      ( 05 Sep 2019 06:00 )             30.3   baso x      eos x      imm gran x      lymph x      mono x      poly x                            9.1    9.38  )-----------( 256      ( 04 Sep 2019 05:34 )             32.9   baso 0.2    eos 4.1    imm gran 1.4    lymph 24.6   mono 7.2    poly 62.5                         9.5    11.89 )-----------( 236      ( 03 Sep 2019 07:15 )             33.1   baso x      eos x      imm gran x      lymph x      mono x      poly x          MEDICATIONS  (STANDING):  benzocaine 15 mG/menthol 3.6 mG Lozenge 1 Lozenge Oral three times a day  chlorhexidine 2% Cloths 1 Application(s) Topical daily  dextrose 5% + sodium chloride 0.45% with potassium chloride 20 mEq/L 1000 milliLiter(s) (75 mL/Hr) IV Continuous <Continuous>  dextrose 50% Injectable 12.5 Gram(s) IV Push once  dextrose 50% Injectable 25 Gram(s) IV Push once  dextrose 50% Injectable 25 Gram(s) IV Push once  enoxaparin Injectable 30 milliGRAM(s) SubCutaneous two times a day  insulin lispro (HumaLOG) corrective regimen sliding scale   SubCutaneous every 6 hours  nystatin Powder 1 Application(s) Topical every 12 hours  pantoprazole  Injectable 40 milliGRAM(s) IV Push daily    MEDICATIONS  (PRN):  dextrose 40% Gel 15 Gram(s) Oral once PRN Blood Glucose LESS THAN 70 milliGRAM(s)/deciliter  enalaprilat Injectable 1.25 milliGRAM(s) IV Push every 6 hours PRN hypertension  glucagon  Injectable 1 milliGRAM(s) IntraMuscular once PRN Glucose LESS THAN 70 milligrams/deciliter

## 2019-09-06 NOTE — PROGRESS NOTE ADULT - ASSESSMENT
Assessment:  Pt is a 68F presenting with epigastric abdominal pain, with CT showing ventral hernia with small bowel and fluid in hernia sac and a large ovarian cyst 10cm with calcification. Doing well with return of bowel function. Plan for potential OR Friday (noon) in conjunction with GYNONC for removal of ovarian cyst and ventral hernia repair.     Plan:  - s/p dental assessment and tooth extraction  - s/p cath 9/5, appreciate Dr. Belcher recommendations and clearance for OR  - OR today with gyn onc  - NPO, NGT  - Continue to monitor GI function  - replete lykraig PRN  - Medical clearance for OR.             - OP stress test obtained            - EKG obtained            - echo completed w/ EF 65            - Medicine for preop clearance   - PT: rehab facility, case management aware and patient amenable pending operative plan  - dvt ppx to BID LVX 30mg sq    Dispo pending authorization    B Team Surgery  [id number]

## 2019-09-06 NOTE — PROGRESS NOTE ADULT - ASSESSMENT
DM  Monitor finger stick. Insulin coverage. Diabetic education and Diabetic diet. Consider nutrition consultation.    HTN  cont IV vasotec for now    PreOp  Based on current ACC/AHA guidelines, patient history and physical exam, the patient is considered to have elevated risk  cath show no CAD  no objection to proceed to OR

## 2019-09-06 NOTE — PROGRESS NOTE ADULT - SUBJECTIVE AND OBJECTIVE BOX
GENERAL SURGERY PROGRESS NOTE      SUBJECTIVE  no complaints. denies n/v.  s/p cath 9/5 normal    OVERNIGHT EVENTS:    10-point review of systems completed and negative except as noted above.      OBJECTIVE    MEDICATIONS  benzocaine 15 mG/menthol 3.6 mG Lozenge 1 Lozenge Oral three times a day  chlorhexidine 2% Cloths 1 Application(s) Topical daily  dextrose 40% Gel 15 Gram(s) Oral once PRN  dextrose 5% + sodium chloride 0.45% with potassium chloride 20 mEq/L 1000 milliLiter(s) IV Continuous <Continuous>  dextrose 50% Injectable 12.5 Gram(s) IV Push once  dextrose 50% Injectable 25 Gram(s) IV Push once  dextrose 50% Injectable 25 Gram(s) IV Push once  enalaprilat Injectable 1.25 milliGRAM(s) IV Push every 6 hours PRN  enoxaparin Injectable 30 milliGRAM(s) SubCutaneous two times a day  glucagon  Injectable 1 milliGRAM(s) IntraMuscular once PRN  insulin lispro (HumaLOG) corrective regimen sliding scale   SubCutaneous every 6 hours  magnesium sulfate  IVPB 2 Gram(s) IV Intermittent once  nystatin Powder 1 Application(s) Topical every 12 hours  pantoprazole  Injectable 40 milliGRAM(s) IV Push daily      PHYSICAL EXAM  T(C): 36.5 (09-06-19 @ 06:13), Max: 36.7 (09-05-19 @ 22:03)  HR: 74 (09-06-19 @ 06:13) (74 - 86)  BP: 153/75 (09-06-19 @ 06:13) (144/57 - 157/63)  RR: 18 (09-06-19 @ 06:13) (18 - 20)  SpO2: 98% (09-06-19 @ 06:13) (91% - 98%)    09-05-19 @ 07:01  -  09-06-19 @ 07:00  --------------------------------------------------------  IN: 300 mL / OUT: 2000 mL / NET: -1700 mL        General: Appears well, NAD, NGT in place  Neuro: AAOx3  CHEST: breathing comfortably  CV: appears well perfused  Abdomen: soft, nontender, nondistended  Extremities: Grossly symmetric    LABS                        8.8    10.08 )-----------( 285      ( 05 Sep 2019 06:00 )             30.3     09-06    140  |  103  |  5<L>  ----------------------------<  133<H>  4.0   |  28  |  0.50    Ca    8.8      06 Sep 2019 06:47  Phos  2.9     09-06  Mg     1.9     09-06    TPro  6.4  /  Alb  3.0<L>  /  TBili  0.3  /  DBili  x   /  AST  10  /  ALT  8   /  AlkPhos  103  09-06    PT/INR - ( 06 Sep 2019 06:47 )   PT: 12.4 SEC;   INR: 1.08          PTT - ( 06 Sep 2019 06:47 )  PTT:29.9 SEC

## 2019-09-06 NOTE — BRIEF OPERATIVE NOTE - OPERATION/FINDINGS
primary repair of umbilical hernia  bowel reduced and hernia sac was ligated  removal of adnexal masses to be dictated by Gynecology

## 2019-09-06 NOTE — CHART NOTE - NSCHARTNOTEFT_GEN_A_CORE
S: Patient doing well, denies fevers, chills, nausea, emesis, chest pain, SOB. C/O some pain around incision site but is manageable.  -/- F/BM. Has not ambulated yet.      O: Vital Signs Last 24 Hrs  T(C): 36.3 (06 Sep 2019 21:36), Max: 36.9 (06 Sep 2019 18:00)  T(F): 97.4 (06 Sep 2019 21:36), Max: 98.4 (06 Sep 2019 18:00)  HR: 88 (06 Sep 2019 21:36) (74 - 96)  BP: 147/52 (06 Sep 2019 21:36) (116/44 - 167/51)  BP(mean): 70 (06 Sep 2019 19:00) (62 - 118)  RR: 17 (06 Sep 2019 21:36) (12 - 20)  SpO2: 97% (06 Sep 2019 21:36) (89% - 100%)      05 Sep 2019 07:01  -  06 Sep 2019 07:00  --------------------------------------------------------  IN:    dextrose 5% + sodium chloride 0.45% with potassium chloride 20 mEq/L: 300 mL  Total IN: 300 mL    OUT:    Nasoenteral Tube: 350 mL    Voided: 1650 mL  Total OUT: 2000 mL    Total NET: -1700 mL      06 Sep 2019 07:01  -  06 Sep 2019 23:00  --------------------------------------------------------  IN:    lactated ringers.: 300 mL  Total IN: 300 mL    OUT:    Indwelling Catheter - Urethral: 220 mL    Nasoenteral Tube: 150 mL    Voided: 300 mL  Total OUT: 670 mL    Total NET: -370 mL          Physical Exam:    Gen: Morbidly obese female in no acute distress  Resp: Clear to auscultation bilaterally with no wheezes, rale, or rhonchi  CV: Regular rate and rhythm with no murmur, gallop, or rub  GI: Large pannusw with midline incision with overlying dress c/d/i.  Appropriately tender around incision.   MSK: Moves all extremities equally  Skin: No rashes    Labs:                        8.8    10.08 )-----------( 285      ( 05 Sep 2019 06:00 )             30.3     06 Sep 2019 06:47    140    |  103    |  5      ----------------------------<  133    4.0     |  28     |  0.50     Ca    8.8        06 Sep 2019 06:47  Phos  2.9       06 Sep 2019 06:47  Mg     1.9       06 Sep 2019 06:47    TPro  6.4    /  Alb  3.0    /  TBili  0.3    /  DBili  x      /  AST  10     /  ALT  8      /  AlkPhos  103    06 Sep 2019 06:47    PT/INR - ( 06 Sep 2019 06:47 )   PT: 12.4 SEC;   INR: 1.08          PTT - ( 06 Sep 2019 06:47 )  PTT:29.9 SEC  CAPILLARY BLOOD GLUCOSE      POCT Blood Glucose.: 132 mg/dL (06 Sep 2019 17:34)  POCT Blood Glucose.: 133 mg/dL (06 Sep 2019 09:22)  POCT Blood Glucose.: 131 mg/dL (06 Sep 2019 06:05)  POCT Blood Glucose.: 97 mg/dL (05 Sep 2019 23:53)        LIVER FUNCTIONS - ( 06 Sep 2019 06:47 )  Alb: 3.0 g/dL / Pro: 6.4 g/dL / ALK PHOS: 103 u/L / ALT: 8 u/L / AST: 10 u/L / GGT: x                 MEDICATIONS  (STANDING):  acetaminophen  IVPB .. 1000 milliGRAM(s) IV Intermittent every 6 hours  benzocaine 15 mG/menthol 3.6 mG Lozenge 1 Lozenge Oral three times a day  chlorhexidine 2% Cloths 1 Application(s) Topical daily  dextrose 50% Injectable 12.5 Gram(s) IV Push once  dextrose 50% Injectable 25 Gram(s) IV Push once  dextrose 50% Injectable 25 Gram(s) IV Push once  enoxaparin Injectable 30 milliGRAM(s) SubCutaneous two times a day  insulin lispro (HumaLOG) corrective regimen sliding scale   SubCutaneous every 6 hours  ketorolac   Injectable 15 milliGRAM(s) IV Push every 6 hours  lactated ringers. 1000 milliLiter(s) (100 mL/Hr) IV Continuous <Continuous>  nystatin Powder 1 Application(s) Topical every 12 hours  pantoprazole  Injectable 40 milliGRAM(s) IV Push daily    MEDICATIONS  (PRN):  dextrose 40% Gel 15 Gram(s) Oral once PRN Blood Glucose LESS THAN 70 milliGRAM(s)/deciliter  enalaprilat Injectable 1.25 milliGRAM(s) IV Push every 6 hours PRN hypertension  glucagon  Injectable 1 milliGRAM(s) IntraMuscular once PRN Glucose LESS THAN 70 milligrams/deciliter  HYDROmorphone  Injectable 0.5 milliGRAM(s) IV Push every 4 hours PRN Moderate Pain (4 - 6)      Assessment:    CAS CASSIDY is a 68y Female with F presenting with epigastric abdominal pain, with CT showing ventral hernia with small bowel and fluid in hernia sac and a large ovarian cyst 10cm with calcification.  Now s/p primary repair of umbilical hernia and removal of adnexal masses.  Doing well post-operatively.      Plan:    Fluids:  LR @ 100  Diet: NPO  Pain control: IV tylenol and toradol    B team pager [phone]

## 2019-09-06 NOTE — CHART NOTE - NSCHARTNOTEFT_GEN_A_CORE
Seen in [facility] with a family member.  I have spoken with REBECCA previously and with MG today. I reviewed, JSW's Consult 9/1/19 and the gyn note today. The labs and CT report reviewed.  I disc the operative plan (Ex Lap, BSO, possible Hyst, poss Staging) with the pt and family member. All Q/A.   Consent in chart.

## 2019-09-06 NOTE — PROGRESS NOTE ADULT - ASSESSMENT
69yo F with MHx of morbid obesity (BMI 50), DM2, HTN, admitted with SBO secondary to ventral hernia, also noted to have adnexal mass on admission imaging. Patient for OR today for ventral hernia repair with gen surg and BSO, possible MATEO, staging with Dr. Rivero of gyn oncology. Patient cleared for surgery by medicine and cardiology, for OR at 12p

## 2019-09-07 LAB
ANION GAP SERPL CALC-SCNC: 11 MMO/L — SIGNIFICANT CHANGE UP (ref 7–14)
ANION GAP SERPL CALC-SCNC: 9 MMO/L — SIGNIFICANT CHANGE UP (ref 7–14)
BUN SERPL-MCNC: 7 MG/DL — SIGNIFICANT CHANGE UP (ref 7–23)
BUN SERPL-MCNC: 7 MG/DL — SIGNIFICANT CHANGE UP (ref 7–23)
CALCIUM SERPL-MCNC: 8.1 MG/DL — LOW (ref 8.4–10.5)
CALCIUM SERPL-MCNC: 8.3 MG/DL — LOW (ref 8.4–10.5)
CHLORIDE SERPL-SCNC: 104 MMOL/L — SIGNIFICANT CHANGE UP (ref 98–107)
CHLORIDE SERPL-SCNC: 105 MMOL/L — SIGNIFICANT CHANGE UP (ref 98–107)
CO2 SERPL-SCNC: 24 MMOL/L — SIGNIFICANT CHANGE UP (ref 22–31)
CO2 SERPL-SCNC: 27 MMOL/L — SIGNIFICANT CHANGE UP (ref 22–31)
CREAT SERPL-MCNC: 0.57 MG/DL — SIGNIFICANT CHANGE UP (ref 0.5–1.3)
CREAT SERPL-MCNC: 0.57 MG/DL — SIGNIFICANT CHANGE UP (ref 0.5–1.3)
GLUCOSE BLDC GLUCOMTR-MCNC: 114 MG/DL — HIGH (ref 70–99)
GLUCOSE SERPL-MCNC: 112 MG/DL — HIGH (ref 70–99)
GLUCOSE SERPL-MCNC: 133 MG/DL — HIGH (ref 70–99)
HCT VFR BLD CALC: 32.9 % — LOW (ref 34.5–45)
HGB BLD-MCNC: 9.2 G/DL — LOW (ref 11.5–15.5)
MAGNESIUM SERPL-MCNC: 1.9 MG/DL — SIGNIFICANT CHANGE UP (ref 1.6–2.6)
MAGNESIUM SERPL-MCNC: 1.9 MG/DL — SIGNIFICANT CHANGE UP (ref 1.6–2.6)
MCHC RBC-ENTMCNC: 23.4 PG — LOW (ref 27–34)
MCHC RBC-ENTMCNC: 28 % — LOW (ref 32–36)
MCV RBC AUTO: 83.7 FL — SIGNIFICANT CHANGE UP (ref 80–100)
NRBC # FLD: 0 K/UL — SIGNIFICANT CHANGE UP (ref 0–0)
PHOSPHATE SERPL-MCNC: 3.4 MG/DL — SIGNIFICANT CHANGE UP (ref 2.5–4.5)
PHOSPHATE SERPL-MCNC: 3.8 MG/DL — SIGNIFICANT CHANGE UP (ref 2.5–4.5)
PLATELET # BLD AUTO: 267 K/UL — SIGNIFICANT CHANGE UP (ref 150–400)
PMV BLD: 10.7 FL — SIGNIFICANT CHANGE UP (ref 7–13)
POTASSIUM SERPL-MCNC: 4.8 MMOL/L — SIGNIFICANT CHANGE UP (ref 3.5–5.3)
POTASSIUM SERPL-MCNC: 5.5 MMOL/L — HIGH (ref 3.5–5.3)
POTASSIUM SERPL-SCNC: 4.8 MMOL/L — SIGNIFICANT CHANGE UP (ref 3.5–5.3)
POTASSIUM SERPL-SCNC: 5.5 MMOL/L — HIGH (ref 3.5–5.3)
RBC # BLD: 3.93 M/UL — SIGNIFICANT CHANGE UP (ref 3.8–5.2)
RBC # FLD: 15.7 % — HIGH (ref 10.3–14.5)
SODIUM SERPL-SCNC: 139 MMOL/L — SIGNIFICANT CHANGE UP (ref 135–145)
SODIUM SERPL-SCNC: 141 MMOL/L — SIGNIFICANT CHANGE UP (ref 135–145)
WBC # BLD: 18.43 K/UL — HIGH (ref 3.8–10.5)
WBC # FLD AUTO: 18.43 K/UL — HIGH (ref 3.8–10.5)

## 2019-09-07 RX ORDER — INFLUENZA VIRUS VACCINE 15; 15; 15; 15 UG/.5ML; UG/.5ML; UG/.5ML; UG/.5ML
0.5 SUSPENSION INTRAMUSCULAR ONCE
Refills: 0 | Status: COMPLETED | OUTPATIENT
Start: 2019-09-07 | End: 2019-09-09

## 2019-09-07 RX ORDER — DEXTROSE MONOHYDRATE, SODIUM CHLORIDE, AND POTASSIUM CHLORIDE 50; .745; 4.5 G/1000ML; G/1000ML; G/1000ML
1000 INJECTION, SOLUTION INTRAVENOUS
Refills: 0 | Status: DISCONTINUED | OUTPATIENT
Start: 2019-09-07 | End: 2019-09-08

## 2019-09-07 RX ORDER — MAGNESIUM SULFATE 500 MG/ML
1 VIAL (ML) INJECTION ONCE
Refills: 0 | Status: COMPLETED | OUTPATIENT
Start: 2019-09-07 | End: 2019-09-07

## 2019-09-07 RX ADMIN — Medication 15 MILLIGRAM(S): at 12:36

## 2019-09-07 RX ADMIN — Medication 15 MILLIGRAM(S): at 06:23

## 2019-09-07 RX ADMIN — Medication 15 MILLIGRAM(S): at 18:45

## 2019-09-07 RX ADMIN — NYSTATIN CREAM 1 APPLICATION(S): 100000 CREAM TOPICAL at 18:30

## 2019-09-07 RX ADMIN — HYDROMORPHONE HYDROCHLORIDE 0.5 MILLIGRAM(S): 2 INJECTION INTRAMUSCULAR; INTRAVENOUS; SUBCUTANEOUS at 02:00

## 2019-09-07 RX ADMIN — Medication 1000 MILLIGRAM(S): at 09:21

## 2019-09-07 RX ADMIN — DEXTROSE MONOHYDRATE, SODIUM CHLORIDE, AND POTASSIUM CHLORIDE 100 MILLILITER(S): 50; .745; 4.5 INJECTION, SOLUTION INTRAVENOUS at 06:07

## 2019-09-07 RX ADMIN — Medication 400 MILLIGRAM(S): at 09:06

## 2019-09-07 RX ADMIN — HYDROMORPHONE HYDROCHLORIDE 0.5 MILLIGRAM(S): 2 INJECTION INTRAMUSCULAR; INTRAVENOUS; SUBCUTANEOUS at 13:55

## 2019-09-07 RX ADMIN — ENOXAPARIN SODIUM 30 MILLIGRAM(S): 100 INJECTION SUBCUTANEOUS at 10:03

## 2019-09-07 RX ADMIN — Medication 400 MILLIGRAM(S): at 01:34

## 2019-09-07 RX ADMIN — Medication 100 GRAM(S): at 13:56

## 2019-09-07 RX ADMIN — Medication 15 MILLIGRAM(S): at 23:22

## 2019-09-07 RX ADMIN — BENZOCAINE AND MENTHOL 1 LOZENGE: 5; 1 LIQUID ORAL at 13:48

## 2019-09-07 RX ADMIN — Medication 1000 MILLIGRAM(S): at 17:16

## 2019-09-07 RX ADMIN — Medication 15 MILLIGRAM(S): at 12:21

## 2019-09-07 RX ADMIN — Medication 15 MILLIGRAM(S): at 18:30

## 2019-09-07 RX ADMIN — DEXTROSE MONOHYDRATE, SODIUM CHLORIDE, AND POTASSIUM CHLORIDE 100 MILLILITER(S): 50; .745; 4.5 INJECTION, SOLUTION INTRAVENOUS at 17:03

## 2019-09-07 RX ADMIN — Medication 1000 MILLIGRAM(S): at 01:59

## 2019-09-07 RX ADMIN — Medication 15 MILLIGRAM(S): at 23:52

## 2019-09-07 RX ADMIN — NYSTATIN CREAM 1 APPLICATION(S): 100000 CREAM TOPICAL at 06:08

## 2019-09-07 RX ADMIN — HYDROMORPHONE HYDROCHLORIDE 0.5 MILLIGRAM(S): 2 INJECTION INTRAMUSCULAR; INTRAVENOUS; SUBCUTANEOUS at 02:15

## 2019-09-07 RX ADMIN — PANTOPRAZOLE SODIUM 40 MILLIGRAM(S): 20 TABLET, DELAYED RELEASE ORAL at 12:23

## 2019-09-07 RX ADMIN — ENOXAPARIN SODIUM 30 MILLIGRAM(S): 100 INJECTION SUBCUTANEOUS at 23:22

## 2019-09-07 RX ADMIN — Medication 15 MILLIGRAM(S): at 06:08

## 2019-09-07 RX ADMIN — Medication 400 MILLIGRAM(S): at 17:01

## 2019-09-07 RX ADMIN — HYDROMORPHONE HYDROCHLORIDE 0.5 MILLIGRAM(S): 2 INJECTION INTRAMUSCULAR; INTRAVENOUS; SUBCUTANEOUS at 14:10

## 2019-09-07 NOTE — PROGRESS NOTE ADULT - ASSESSMENT
Assessment:  Pt is a 68F presenting with epigastric abdominal pain, with CT showing ventral hernia with small bowel and fluid in hernia sac and a large ovarian cyst 10cm with calcification. Doing well with return of bowel function. s/p with GYNONC for removal of ovarian cyst and ventral hernia repair.     Plan:  - s/p ventral hernia repair and ovarian cystectomy 9/6, appreciate gyn onc recs, f/u with medicine at discharge  - s/p dental assessment and tooth extraction  - s/p cath 9/5, appreciate Dr. Belcher recommendations and clearance for OR  - OR today with gyn onc  - NPO, NGT  - Continue to monitor GI function  - replete lytes PRN, AM 9/7 labs hemolyzed, f/u repeat 9/7 AM BMP  - Medical clearance for OR given 9/6            - OP stress test obtained            - EKG obtained            - echo completed w/ EF 65            - Medicine for preop clearance   - PT: rehab facility, case management aware and patient amenable pending operative plan  - dvt ppx to BID LVX 30mg sq    Dispo pending authorization    B Team Surgery  [id number]

## 2019-09-07 NOTE — PROGRESS NOTE ADULT - ASSESSMENT
DM  Monitor finger stick. Insulin coverage. Diabetic education and Diabetic diet. Consider nutrition consultation.    HTN  cont IV vasotec for now  will change to PO once able to take

## 2019-09-07 NOTE — PROGRESS NOTE ADULT - SUBJECTIVE AND OBJECTIVE BOX
68y Female s/p ex lap, ventral hernia repair, BSO, lysis of adhesions      T(C): 36.2 (09-07-19 @ 09:58), Max: 36.9 (09-06-19 @ 18:00)  HR: 84 (09-07-19 @ 09:58) (72 - 94)  BP: 137/50 (09-07-19 @ 09:58) (116/44 - 147/52)  RR: 18 (09-07-19 @ 09:58) (12 - 20)  SpO2: 99% (09-07-19 @ 09:58) (89% - 100%)  Wt(kg): --    Pt seen, doing well, no anesthesia complications or complaints noted or reported.   No Nausea  Pain well controlled

## 2019-09-07 NOTE — PROGRESS NOTE ADULT - SUBJECTIVE AND OBJECTIVE BOX
GYN Oncology Fellow Note    Patient seen and examined at bedside. No complaints overnight. Pain controlled. No nausea/emesis, CP/SOB, fevers, chills. Hough in place. Not OOB yet.     MEDICATIONS  (STANDING):  acetaminophen  IVPB .. 1000 milliGRAM(s) IV Intermittent every 6 hours  benzocaine 15 mG/menthol 3.6 mG Lozenge 1 Lozenge Oral three times a day  chlorhexidine 2% Cloths 1 Application(s) Topical daily  dextrose 5% + sodium chloride 0.45% with potassium chloride 20 mEq/L 1000 milliLiter(s) (100 mL/Hr) IV Continuous <Continuous>  dextrose 50% Injectable 12.5 Gram(s) IV Push once  dextrose 50% Injectable 25 Gram(s) IV Push once  dextrose 50% Injectable 25 Gram(s) IV Push once  enoxaparin Injectable 30 milliGRAM(s) SubCutaneous two times a day  insulin lispro (HumaLOG) corrective regimen sliding scale   SubCutaneous every 6 hours  ketorolac   Injectable 15 milliGRAM(s) IV Push every 6 hours  nystatin Powder 1 Application(s) Topical every 12 hours  pantoprazole  Injectable 40 milliGRAM(s) IV Push daily    MEDICATIONS  (PRN):  dextrose 40% Gel 15 Gram(s) Oral once PRN Blood Glucose LESS THAN 70 milliGRAM(s)/deciliter  enalaprilat Injectable 1.25 milliGRAM(s) IV Push every 6 hours PRN hypertension  glucagon  Injectable 1 milliGRAM(s) IntraMuscular once PRN Glucose LESS THAN 70 milligrams/deciliter  HYDROmorphone  Injectable 0.5 milliGRAM(s) IV Push every 4 hours PRN Moderate Pain (4 - 6)      Allergies    No Known Allergies    Intolerances        12 point ROS negative except as outlined above    Vital Signs Last 24 Hrs  T(C): 36.7 (07 Sep 2019 06:06), Max: 36.9 (06 Sep 2019 18:00)  T(F): 98 (07 Sep 2019 06:06), Max: 98.4 (06 Sep 2019 18:00)  HR: 72 (07 Sep 2019 06:06) (72 - 96)  BP: 128/52 (07 Sep 2019 06:06) (116/44 - 167/51)  BP(mean): 70 (06 Sep 2019 19:00) (62 - 118)  RR: 18 (07 Sep 2019 06:06) (12 - 20)  SpO2: 98% (07 Sep 2019 06:06) (89% - 100%)      PHYSICAL EXAM:    GA: NAD, A+0 x 3  CV: RRR  Pulm: CTA BL  soft, nontender, nondistended, no rebound or guarding,   Incision: clean, dry and intact; dressing  Hough: clear urine  Extremities: no c/c/e no calf tenderness          LABS:             9.2    18.43 )-----------( 267      ( 09-07 @ 06:11 )             32.9                8.8    10.08 )-----------( 285      ( 09-05 @ 06:00 )             30.3       09-07 @ 06:11    139  |  104  |  7   ----------------------------<  112  5.5   |  24  |  0.57    09-06 @ 06:47    140  |  103  |  5   ----------------------------<  133  4.0   |  28  |  0.50    09-05 @ 06:00    138  |  104  |  6   ----------------------------<  122  4.3   |  28  |  0.51      Phos  3.8     09-07 @ 06:11  Phos  2.9     09-06 @ 06:47  Phos  2.6     09-05 @ 06:00  Mg     1.9     09-07 @ 06:11  Mg     1.9     09-06 @ 06:47  Mg     1.9     09-05 @ 06:00    TPro  6.4  /  Alb  3.0  /  TBili  0.3  /  DBili  x   /  AST  10  /  ALT  8   /  AlkPhos  103  09-06 @ 06:47    PT/INR - ( 06 Sep 2019 06:47 )   PT: 12.4 SEC;   INR: 1.08          PTT - ( 06 Sep 2019 06:47 )  PTT:29.9 SEC      RADIOLOGY & ADDITIONAL TESTS:

## 2019-09-07 NOTE — PROGRESS NOTE ADULT - SUBJECTIVE AND OBJECTIVE BOX
GENERAL SURGERY PROGRESS NOTE    STATUS POST:  ventral hernia repair and ovarian cystectomy   POST OPERATIVE DAY #: 1    SUBJECTIVE  denies ab pain, n/v. Wants NGT out.   -/-    OVERNIGHT EVENTS:  POC completed     10-point review of systems completed and negative except as noted above.      OBJECTIVE    MEDICATIONS  acetaminophen  IVPB .. 1000 milliGRAM(s) IV Intermittent every 6 hours  benzocaine 15 mG/menthol 3.6 mG Lozenge 1 Lozenge Oral three times a day  chlorhexidine 2% Cloths 1 Application(s) Topical daily  dextrose 40% Gel 15 Gram(s) Oral once PRN  dextrose 5% + sodium chloride 0.45% with potassium chloride 20 mEq/L 1000 milliLiter(s) IV Continuous <Continuous>  dextrose 50% Injectable 12.5 Gram(s) IV Push once  dextrose 50% Injectable 25 Gram(s) IV Push once  dextrose 50% Injectable 25 Gram(s) IV Push once  enalaprilat Injectable 1.25 milliGRAM(s) IV Push every 6 hours PRN  enoxaparin Injectable 30 milliGRAM(s) SubCutaneous two times a day  glucagon  Injectable 1 milliGRAM(s) IntraMuscular once PRN  HYDROmorphone  Injectable 0.5 milliGRAM(s) IV Push every 4 hours PRN  insulin lispro (HumaLOG) corrective regimen sliding scale   SubCutaneous every 6 hours  ketorolac   Injectable 15 milliGRAM(s) IV Push every 6 hours  nystatin Powder 1 Application(s) Topical every 12 hours  pantoprazole  Injectable 40 milliGRAM(s) IV Push daily      PHYSICAL EXAM  T(C): 36.7 (09-07-19 @ 06:06), Max: 36.9 (09-06-19 @ 18:00)  HR: 72 (09-07-19 @ 06:06) (72 - 96)  BP: 128/52 (09-07-19 @ 06:06) (116/44 - 167/51)  RR: 18 (09-07-19 @ 06:06) (12 - 20)  SpO2: 98% (09-07-19 @ 06:06) (89% - 100%)    09-06-19 @ 07:01  -  09-07-19 @ 07:00  --------------------------------------------------------  IN: 1100 mL / OUT: 820 mL / NET: 280 mL    09-07-19 @ 07:01  -  09-07-19 @ 09:29  --------------------------------------------------------  IN: 0 mL / OUT: 100 mL / NET: -100 mL        General: Appears well, NAD  Neuro: AAOx3  CHEST: breathing comfortably  CV: appears well perfused  Abdomen: obese, soft, incision TTP dressing c/d/i, nondistended, no rebound or guarding  Extremities: Grossly symmetric    LABS                        9.2    18.43 )-----------( 267      ( 07 Sep 2019 06:11 )             32.9     09-07    139  |  104  |  7   ----------------------------<  112<H>  5.5<H>   |  24  |  0.57    Ca    8.3<L>      07 Sep 2019 06:11  Phos  3.8     09-07  Mg     1.9     09-07    TPro  6.4  /  Alb  3.0<L>  /  TBili  0.3  /  DBili  x   /  AST  10  /  ALT  8   /  AlkPhos  103  09-06    PT/INR - ( 06 Sep 2019 06:47 )   PT: 12.4 SEC;   INR: 1.08          PTT - ( 06 Sep 2019 06:47 )  PTT:29.9 SEC

## 2019-09-07 NOTE — PROGRESS NOTE ADULT - SUBJECTIVE AND OBJECTIVE BOX
Subjective: Patient seen and examined. No new events except as noted.     SUBJECTIVE/ROS:  feels ok   no cp or sob       MEDICATIONS:  MEDICATIONS  (STANDING):  acetaminophen  IVPB .. 1000 milliGRAM(s) IV Intermittent every 6 hours  benzocaine 15 mG/menthol 3.6 mG Lozenge 1 Lozenge Oral three times a day  chlorhexidine 2% Cloths 1 Application(s) Topical daily  dextrose 5% + sodium chloride 0.45% with potassium chloride 20 mEq/L 1000 milliLiter(s) (100 mL/Hr) IV Continuous <Continuous>  dextrose 50% Injectable 12.5 Gram(s) IV Push once  dextrose 50% Injectable 25 Gram(s) IV Push once  dextrose 50% Injectable 25 Gram(s) IV Push once  enoxaparin Injectable 30 milliGRAM(s) SubCutaneous two times a day  insulin lispro (HumaLOG) corrective regimen sliding scale   SubCutaneous every 6 hours  ketorolac   Injectable 15 milliGRAM(s) IV Push every 6 hours  nystatin Powder 1 Application(s) Topical every 12 hours  pantoprazole  Injectable 40 milliGRAM(s) IV Push daily      PHYSICAL EXAM:  T(C): 36.7 (09-07-19 @ 06:06), Max: 36.9 (09-06-19 @ 18:00)  HR: 72 (09-07-19 @ 06:06) (72 - 96)  BP: 128/52 (09-07-19 @ 06:06) (116/44 - 167/51)  RR: 18 (09-07-19 @ 06:06) (12 - 20)  SpO2: 98% (09-07-19 @ 06:06) (89% - 100%)  Wt(kg): --  I&O's Summary    06 Sep 2019 07:01  -  07 Sep 2019 07:00  --------------------------------------------------------  IN: 700 mL / OUT: 820 mL / NET: -120 mL      Height (cm): 170.2 (09-06 @ 10:06)  Weight (kg): 137.4 (09-06 @ 10:06)  BMI (kg/m2): 47.4 (09-06 @ 10:06)  BSA (m2): 2.41 (09-06 @ 10:06)      JVP: Normal  Neck: supple  Lung: clear   CV: S1 S2 , Murmur:  Abd: soft  Ext: No edema  neuro: Awake / alert  Psych: flat affect  Skin: normal``    LABS/DATA:    CARDIAC MARKERS:            09-06    140  |  103  |  5<L>  ----------------------------<  133<H>  4.0   |  28  |  0.50    Ca    8.8      06 Sep 2019 06:47  Phos  2.9     09-06  Mg     1.9     09-06    TPro  6.4  /  Alb  3.0<L>  /  TBili  0.3  /  DBili  x   /  AST  10  /  ALT  8   /  AlkPhos  103  09-06    proBNP:   Lipid Profile:   HgA1c:   TSH:     TELE:  EKG:

## 2019-09-08 LAB
ANION GAP SERPL CALC-SCNC: 9 MMO/L — SIGNIFICANT CHANGE UP (ref 7–14)
BUN SERPL-MCNC: 6 MG/DL — LOW (ref 7–23)
CALCIUM SERPL-MCNC: 8.6 MG/DL — SIGNIFICANT CHANGE UP (ref 8.4–10.5)
CHLORIDE SERPL-SCNC: 106 MMOL/L — SIGNIFICANT CHANGE UP (ref 98–107)
CO2 SERPL-SCNC: 26 MMOL/L — SIGNIFICANT CHANGE UP (ref 22–31)
CREAT SERPL-MCNC: 0.54 MG/DL — SIGNIFICANT CHANGE UP (ref 0.5–1.3)
GLUCOSE SERPL-MCNC: 130 MG/DL — HIGH (ref 70–99)
HCT VFR BLD CALC: 29.5 % — LOW (ref 34.5–45)
HGB BLD-MCNC: 8.3 G/DL — LOW (ref 11.5–15.5)
MAGNESIUM SERPL-MCNC: 2 MG/DL — SIGNIFICANT CHANGE UP (ref 1.6–2.6)
MCHC RBC-ENTMCNC: 23.4 PG — LOW (ref 27–34)
MCHC RBC-ENTMCNC: 28.1 % — LOW (ref 32–36)
MCV RBC AUTO: 83.1 FL — SIGNIFICANT CHANGE UP (ref 80–100)
NRBC # FLD: 0 K/UL — SIGNIFICANT CHANGE UP (ref 0–0)
PHOSPHATE SERPL-MCNC: 2.5 MG/DL — SIGNIFICANT CHANGE UP (ref 2.5–4.5)
PLATELET # BLD AUTO: 262 K/UL — SIGNIFICANT CHANGE UP (ref 150–400)
PMV BLD: 9.9 FL — SIGNIFICANT CHANGE UP (ref 7–13)
POTASSIUM SERPL-MCNC: 4.3 MMOL/L — SIGNIFICANT CHANGE UP (ref 3.5–5.3)
POTASSIUM SERPL-SCNC: 4.3 MMOL/L — SIGNIFICANT CHANGE UP (ref 3.5–5.3)
RBC # BLD: 3.55 M/UL — LOW (ref 3.8–5.2)
RBC # FLD: 15.8 % — HIGH (ref 10.3–14.5)
SODIUM SERPL-SCNC: 141 MMOL/L — SIGNIFICANT CHANGE UP (ref 135–145)
WBC # BLD: 11.79 K/UL — HIGH (ref 3.8–10.5)
WBC # FLD AUTO: 11.79 K/UL — HIGH (ref 3.8–10.5)

## 2019-09-08 RX ORDER — OXYCODONE HYDROCHLORIDE 5 MG/1
10 TABLET ORAL EVERY 4 HOURS
Refills: 0 | Status: DISCONTINUED | OUTPATIENT
Start: 2019-09-08 | End: 2019-09-09

## 2019-09-08 RX ORDER — KETOROLAC TROMETHAMINE 30 MG/ML
10 SYRINGE (ML) INJECTION EVERY 6 HOURS
Refills: 0 | Status: DISCONTINUED | OUTPATIENT
Start: 2019-09-08 | End: 2019-09-09

## 2019-09-08 RX ORDER — PANTOPRAZOLE SODIUM 20 MG/1
40 TABLET, DELAYED RELEASE ORAL
Refills: 0 | Status: DISCONTINUED | OUTPATIENT
Start: 2019-09-08 | End: 2019-09-09

## 2019-09-08 RX ORDER — INSULIN LISPRO 100/ML
VIAL (ML) SUBCUTANEOUS
Refills: 0 | Status: DISCONTINUED | OUTPATIENT
Start: 2019-09-08 | End: 2019-09-09

## 2019-09-08 RX ADMIN — HYDROMORPHONE HYDROCHLORIDE 0.5 MILLIGRAM(S): 2 INJECTION INTRAMUSCULAR; INTRAVENOUS; SUBCUTANEOUS at 10:24

## 2019-09-08 RX ADMIN — Medication 15 MILLIGRAM(S): at 12:30

## 2019-09-08 RX ADMIN — PANTOPRAZOLE SODIUM 40 MILLIGRAM(S): 20 TABLET, DELAYED RELEASE ORAL at 11:40

## 2019-09-08 RX ADMIN — NYSTATIN CREAM 1 APPLICATION(S): 100000 CREAM TOPICAL at 05:48

## 2019-09-08 RX ADMIN — Medication 10 MILLIGRAM(S): at 22:45

## 2019-09-08 RX ADMIN — HYDROMORPHONE HYDROCHLORIDE 0.5 MILLIGRAM(S): 2 INJECTION INTRAMUSCULAR; INTRAVENOUS; SUBCUTANEOUS at 05:13

## 2019-09-08 RX ADMIN — Medication 10 MILLIGRAM(S): at 22:15

## 2019-09-08 RX ADMIN — OXYCODONE HYDROCHLORIDE 10 MILLIGRAM(S): 5 TABLET ORAL at 18:31

## 2019-09-08 RX ADMIN — ENOXAPARIN SODIUM 30 MILLIGRAM(S): 100 INJECTION SUBCUTANEOUS at 05:48

## 2019-09-08 RX ADMIN — Medication 15 MILLIGRAM(S): at 05:46

## 2019-09-08 RX ADMIN — ENOXAPARIN SODIUM 30 MILLIGRAM(S): 100 INJECTION SUBCUTANEOUS at 18:26

## 2019-09-08 RX ADMIN — Medication 63.75 MILLIMOLE(S): at 11:36

## 2019-09-08 RX ADMIN — OXYCODONE HYDROCHLORIDE 10 MILLIGRAM(S): 5 TABLET ORAL at 19:31

## 2019-09-08 RX ADMIN — NYSTATIN CREAM 1 APPLICATION(S): 100000 CREAM TOPICAL at 18:26

## 2019-09-08 RX ADMIN — Medication 15 MILLIGRAM(S): at 06:16

## 2019-09-08 RX ADMIN — HYDROMORPHONE HYDROCHLORIDE 0.5 MILLIGRAM(S): 2 INJECTION INTRAMUSCULAR; INTRAVENOUS; SUBCUTANEOUS at 10:39

## 2019-09-08 RX ADMIN — HYDROMORPHONE HYDROCHLORIDE 0.5 MILLIGRAM(S): 2 INJECTION INTRAMUSCULAR; INTRAVENOUS; SUBCUTANEOUS at 04:43

## 2019-09-08 RX ADMIN — Medication 15 MILLIGRAM(S): at 12:45

## 2019-09-08 NOTE — PROGRESS NOTE ADULT - SUBJECTIVE AND OBJECTIVE BOX
R2 Gyn ONC Progress Note POD#  HD#    Subjective:   Pt seen and examined at bedside. No events overnight. Pain well controlled. Patient ambulating. Passing flatus. Tolerating regular diet. Pt denies fever, chills, chest pain, SOB, nausea, vomiting, lightheadedness, dizziness.      Objective:  T(F): 98.5 (09-08-19 @ 06:13), Max: 98.5 (09-07-19 @ 23:18)  HR: 95 (09-08-19 @ 06:13) (84 - 100)  BP: 147/66 (09-08-19 @ 06:13) (130/50 - 147/66)  RR: 18 (09-08-19 @ 06:13) (16 - 18)  SpO2: 96% (09-08-19 @ 06:13) (96% - 99%)  Wt(kg): --  I&O's Summary    07 Sep 2019 07:01  -  08 Sep 2019 07:00  --------------------------------------------------------  IN: 1200 mL / OUT: 1025 mL / NET: 175 mL      CAPILLARY BLOOD GLUCOSE      POCT Blood Glucose.: 132 mg/dL (08 Sep 2019 05:37)  POCT Blood Glucose.: 145 mg/dL (07 Sep 2019 23:44)  POCT Blood Glucose.: 129 mg/dL (07 Sep 2019 23:15)  POCT Blood Glucose.: 133 mg/dL (07 Sep 2019 17:51)  POCT Blood Glucose.: 133 mg/dL (07 Sep 2019 12:27)      MEDICATIONS  (STANDING):  benzocaine 15 mG/menthol 3.6 mG Lozenge 1 Lozenge Oral three times a day  chlorhexidine 2% Cloths 1 Application(s) Topical daily  dextrose 5% + sodium chloride 0.45% with potassium chloride 20 mEq/L 1000 milliLiter(s) (100 mL/Hr) IV Continuous <Continuous>  dextrose 50% Injectable 12.5 Gram(s) IV Push once  dextrose 50% Injectable 25 Gram(s) IV Push once  dextrose 50% Injectable 25 Gram(s) IV Push once  enoxaparin Injectable 30 milliGRAM(s) SubCutaneous two times a day  influenza   Vaccine 0.5 milliLiter(s) IntraMuscular once  insulin lispro (HumaLOG) corrective regimen sliding scale   SubCutaneous every 6 hours  ketorolac   Injectable 15 milliGRAM(s) IV Push every 6 hours  nystatin Powder 1 Application(s) Topical every 12 hours  pantoprazole  Injectable 40 milliGRAM(s) IV Push daily  sodium phosphate IVPB 15 milliMole(s) IV Intermittent once    MEDICATIONS  (PRN):  dextrose 40% Gel 15 Gram(s) Oral once PRN Blood Glucose LESS THAN 70 milliGRAM(s)/deciliter  enalaprilat Injectable 1.25 milliGRAM(s) IV Push every 6 hours PRN hypertension  glucagon  Injectable 1 milliGRAM(s) IntraMuscular once PRN Glucose LESS THAN 70 milligrams/deciliter  HYDROmorphone  Injectable 0.5 milliGRAM(s) IV Push every 4 hours PRN Moderate Pain (4 - 6)      Physical Exam:  Constitutional: NAD, A+O x3. NGT in place(output 175/shift)  CV: RR S1S2 no m/r/g  Lungs: CTA b/l, good air flow b/l  Abdomen: soft, softly-distended, tympanic with gas in the upper abdomen, appropriately-tender. No guarding, no rebound, normal bowel sounds  : Primafit in place  Incision: midline vertical incision with dressing in place clean, dry, intact   Extremities: no lower extremity edema or calf tenderness bilaterally; venodynes in place    LABS:             8.3    11.79 )-----------( 262      ( 09-08 @ 05:53 )             29.5                9.2    18.43 )-----------( 267      ( 09-07 @ 06:11 )             32.9       09-08    141    |  106    |  6<L>   ----------------------------<  130<H>  4.3     |  26     |  0.54   09-07    141    |  105    |  7      ----------------------------<  133<H>  4.8     |  27     |  0.57   09-07    139    |  104    |  7      ----------------------------<  112<H>  5.5<H>   |  24     |  0.57     Ca    8.6        08 Sep 2019 05:53  Ca    8.1<L>      07 Sep 2019 09:44  Ca    8.3<L>      07 Sep 2019 06:11  Phos  2.5       09-08  Phos  3.4       09-07  Phos  3.8       09-07  Mg     2.0       09-08  Mg     1.9       09-07  Mg     1.9       09-07 R2 Gyn ONC Progress Note POD#  HD#    Subjective:   Pt seen and examined at bedside. No events overnight. Pain well controlled. Patient ambulating. Not yet passing flatus. NPO with NGT in place. Pt denies fever, chills, chest pain, SOB, nausea, vomiting, lightheadedness, dizziness.      Objective:  T(F): 98.5 (09-08-19 @ 06:13), Max: 98.5 (09-07-19 @ 23:18)  HR: 95 (09-08-19 @ 06:13) (84 - 100)  BP: 147/66 (09-08-19 @ 06:13) (130/50 - 147/66)  RR: 18 (09-08-19 @ 06:13) (16 - 18)  SpO2: 96% (09-08-19 @ 06:13) (96% - 99%)  Wt(kg): --  I&O's Summary    07 Sep 2019 07:01  -  08 Sep 2019 07:00  --------------------------------------------------------  IN: 1200 mL / OUT: 1025 mL / NET: 175 mL      CAPILLARY BLOOD GLUCOSE      POCT Blood Glucose.: 132 mg/dL (08 Sep 2019 05:37)  POCT Blood Glucose.: 145 mg/dL (07 Sep 2019 23:44)  POCT Blood Glucose.: 129 mg/dL (07 Sep 2019 23:15)  POCT Blood Glucose.: 133 mg/dL (07 Sep 2019 17:51)  POCT Blood Glucose.: 133 mg/dL (07 Sep 2019 12:27)      MEDICATIONS  (STANDING):  benzocaine 15 mG/menthol 3.6 mG Lozenge 1 Lozenge Oral three times a day  chlorhexidine 2% Cloths 1 Application(s) Topical daily  dextrose 5% + sodium chloride 0.45% with potassium chloride 20 mEq/L 1000 milliLiter(s) (100 mL/Hr) IV Continuous <Continuous>  dextrose 50% Injectable 12.5 Gram(s) IV Push once  dextrose 50% Injectable 25 Gram(s) IV Push once  dextrose 50% Injectable 25 Gram(s) IV Push once  enoxaparin Injectable 30 milliGRAM(s) SubCutaneous two times a day  influenza   Vaccine 0.5 milliLiter(s) IntraMuscular once  insulin lispro (HumaLOG) corrective regimen sliding scale   SubCutaneous every 6 hours  ketorolac   Injectable 15 milliGRAM(s) IV Push every 6 hours  nystatin Powder 1 Application(s) Topical every 12 hours  pantoprazole  Injectable 40 milliGRAM(s) IV Push daily  sodium phosphate IVPB 15 milliMole(s) IV Intermittent once    MEDICATIONS  (PRN):  dextrose 40% Gel 15 Gram(s) Oral once PRN Blood Glucose LESS THAN 70 milliGRAM(s)/deciliter  enalaprilat Injectable 1.25 milliGRAM(s) IV Push every 6 hours PRN hypertension  glucagon  Injectable 1 milliGRAM(s) IntraMuscular once PRN Glucose LESS THAN 70 milligrams/deciliter  HYDROmorphone  Injectable 0.5 milliGRAM(s) IV Push every 4 hours PRN Moderate Pain (4 - 6)      Physical Exam:  Constitutional: NAD, A+O x3. NGT in place(output 175/shift)  CV: RR S1S2 no m/r/g  Lungs: CTA b/l, good air flow b/l  Abdomen: soft, softly-distended, tympanic with gas in the upper abdomen, appropriately-tender. No guarding, no rebound, normal bowel sounds  : Primafit in place  Incision: midline vertical incision with dressing in place clean, dry, intact   Extremities: no lower extremity edema or calf tenderness bilaterally; venodynes in place    LABS:             8.3    11.79 )-----------( 262      ( 09-08 @ 05:53 )             29.5                9.2    18.43 )-----------( 267      ( 09-07 @ 06:11 )             32.9       09-08    141    |  106    |  6<L>   ----------------------------<  130<H>  4.3     |  26     |  0.54   09-07    141    |  105    |  7      ----------------------------<  133<H>  4.8     |  27     |  0.57   09-07    139    |  104    |  7      ----------------------------<  112<H>  5.5<H>   |  24     |  0.57     Ca    8.6        08 Sep 2019 05:53  Ca    8.1<L>      07 Sep 2019 09:44  Ca    8.3<L>      07 Sep 2019 06:11  Phos  2.5       09-08  Phos  3.4       09-07  Phos  3.8       09-07  Mg     2.0       09-08  Mg     1.9       09-07  Mg     1.9       09-07

## 2019-09-08 NOTE — PROGRESS NOTE ADULT - SUBJECTIVE AND OBJECTIVE BOX
Subjective: Patient seen and examined. No new events except as noted.     SUBJECTIVE/ROS:  feels ok       MEDICATIONS:  MEDICATIONS  (STANDING):  benzocaine 15 mG/menthol 3.6 mG Lozenge 1 Lozenge Oral three times a day  chlorhexidine 2% Cloths 1 Application(s) Topical daily  dextrose 5% + sodium chloride 0.45% with potassium chloride 20 mEq/L 1000 milliLiter(s) (100 mL/Hr) IV Continuous <Continuous>  dextrose 50% Injectable 12.5 Gram(s) IV Push once  dextrose 50% Injectable 25 Gram(s) IV Push once  dextrose 50% Injectable 25 Gram(s) IV Push once  enoxaparin Injectable 30 milliGRAM(s) SubCutaneous two times a day  influenza   Vaccine 0.5 milliLiter(s) IntraMuscular once  insulin lispro (HumaLOG) corrective regimen sliding scale   SubCutaneous every 6 hours  ketorolac   Injectable 15 milliGRAM(s) IV Push every 6 hours  nystatin Powder 1 Application(s) Topical every 12 hours  pantoprazole  Injectable 40 milliGRAM(s) IV Push daily  sodium phosphate IVPB 15 milliMole(s) IV Intermittent once      PHYSICAL EXAM:  T(C): 36.9 (09-08-19 @ 06:13), Max: 36.9 (09-07-19 @ 13:54)  HR: 95 (09-08-19 @ 06:13) (84 - 100)  BP: 147/66 (09-08-19 @ 06:13) (130/50 - 147/66)  RR: 18 (09-08-19 @ 06:13) (16 - 18)  SpO2: 96% (09-08-19 @ 06:13) (96% - 99%)  Wt(kg): --  I&O's Summary    07 Sep 2019 07:01  -  08 Sep 2019 07:00  --------------------------------------------------------  IN: 1200 mL / OUT: 1025 mL / NET: 175 mL            JVP: Normal  Neck: supple  Lung: clear   CV: S1 S2 , Murmur:  Abd: soft  Ext: No edema  neuro: Awake / alert  Psych: flat affect  Skin: normal``    LABS/DATA:    CARDIAC MARKERS:                                8.3    11.79 )-----------( 262      ( 08 Sep 2019 05:53 )             29.5     09-08    141  |  106  |  6<L>  ----------------------------<  130<H>  4.3   |  26  |  0.54    Ca    8.6      08 Sep 2019 05:53  Phos  2.5     09-08  Mg     2.0     09-08      proBNP:   Lipid Profile:   HgA1c:   TSH:     TELE:  EKG:

## 2019-09-08 NOTE — PROGRESS NOTE ADULT - PROBLEM SELECTOR PLAN 1
Plan per primary team  NPO/NGT  DVT ppx  IV analgesia  ISS  OOB    Will continue to follow  Please call [phone] with any questions or concerns    Rudi URBANO PGY 5
Plan per primary team  NPO/NGT, advance diet per primary team  DVT ppx  IV analgesia while NPO  ISS  OOB    Will continue to follow  Please call [phone] with any questions or concerns    LIA Dai PGY-2
- NPO with NGT  - consents signed 9/5, in chart  - for OR today    VERONICA Metcalf PGY04

## 2019-09-08 NOTE — PROGRESS NOTE ADULT - ASSESSMENT
68 year old admitted with partial SBO and adnexal mass now POD#2 s/p ex-lap BSO, ventral hernia repair. Patient feels well this morning. Afebrile with WBC downtrending and appropriate drop in H/H.

## 2019-09-08 NOTE — PROGRESS NOTE ADULT - SUBJECTIVE AND OBJECTIVE BOX
GENERAL SURGERY PROGRESS NOTE    STATUS POST:  ventral hernia repair and ovarian cystectomy   POST OPERATIVE DAY #: 2    SUBJECTIVE  -/- this AM. Reports feeling okay. Says she does not want to take the NGT out until it is certain it will not need to be replaced.     OVERNIGHT EVENTS:  NAEO    10-point review of systems completed and negative except as noted above.      OBJECTIVE    MEDICATIONS  acetaminophen  IVPB .. 1000 milliGRAM(s) IV Intermittent every 6 hours  benzocaine 15 mG/menthol 3.6 mG Lozenge 1 Lozenge Oral three times a day  chlorhexidine 2% Cloths 1 Application(s) Topical daily  dextrose 40% Gel 15 Gram(s) Oral once PRN  dextrose 5% + sodium chloride 0.45% with potassium chloride 20 mEq/L 1000 milliLiter(s) IV Continuous <Continuous>  dextrose 50% Injectable 12.5 Gram(s) IV Push once  dextrose 50% Injectable 25 Gram(s) IV Push once  dextrose 50% Injectable 25 Gram(s) IV Push once  enalaprilat Injectable 1.25 milliGRAM(s) IV Push every 6 hours PRN  enoxaparin Injectable 30 milliGRAM(s) SubCutaneous two times a day  glucagon  Injectable 1 milliGRAM(s) IntraMuscular once PRN  HYDROmorphone  Injectable 0.5 milliGRAM(s) IV Push every 4 hours PRN  insulin lispro (HumaLOG) corrective regimen sliding scale   SubCutaneous every 6 hours  ketorolac   Injectable 15 milliGRAM(s) IV Push every 6 hours  nystatin Powder 1 Application(s) Topical every 12 hours  pantoprazole  Injectable 40 milliGRAM(s) IV Push daily      PHYSICAL EXAM   Vital Signs Last 24 Hrs  T(C): 36.9 (08 Sep 2019 06:13), Max: 36.9 (07 Sep 2019 13:54)  T(F): 98.5 (08 Sep 2019 06:13), Max: 98.5 (07 Sep 2019 23:18)  HR: 95 (08 Sep 2019 06:13) (84 - 100)  BP: 147/66 (08 Sep 2019 06:13) (130/50 - 147/66)  RR: 18 (08 Sep 2019 06:13) (16 - 18)  SpO2: 96% (08 Sep 2019 06:13) (96% - 99%)      General: Appears well, NAD  Neuro: AAOx3  CHEST: breathing comfortably  CV: appears well perfused  Abdomen: obese, soft, incision dressing with serosanguinous staining inferiorly. Nndistended, no rebound or guarding  Extremities: Grossly symmetric    I&O's Detail    07 Sep 2019 07:01  -  08 Sep 2019 07:00  --------------------------------------------------------  IN:    dextrose 5% + sodium chloride 0.45% with potassium chloride 20 mEq/L: 1200 mL  Total IN: 1200 mL    OUT:    Nasoenteral Tube: 475 mL    Voided: 550 mL  Total OUT: 1025 mL    Total NET: 175 mL          LABS                  09-08    141  |  106  |  6<L>  ----------------------------<  130<H>  4.3   |  26  |  0.54    Ca    8.6      08 Sep 2019 05:53  Phos  2.5     09-08  Mg     2.0     09-08    	                        8.3    11.79 )-----------( 262      ( 08 Sep 2019 05:53 )             29.5

## 2019-09-08 NOTE — PROGRESS NOTE ADULT - ASSESSMENT
Assessment:  Pt is a 68F presenting with epigastric abdominal pain, with CT showing ventral hernia with small bowel and fluid in hernia sac and a large ovarian cyst 10cm with calcification. Now s/p OR with GYNON[facility] for removal of ovarian cyst and ventral hernia repair.     Plan:  - s/p ventral hernia repair and ovarian cystectomy 9/6, appreciate gyn onc recs, f/u with medicine at discharge  - s/p dental assessment and tooth extraction  - s/p cath 9/5 w/  Dr. Belcher - no CAD  - NPO, NGT  - Continue to monitor GI function  - PT: rehab facility, case management aware and patient amenable  - dvt ppx to BID LVX 30mg sq    Dispo pending authorization    B Team Surgery  [id number] Assessment:  Pt is a 68F presenting with epigastric abdominal pain, with CT showing ventral hernia with small bowel and fluid in hernia sac and a large ovarian cyst 10cm with calcification. Now s/p OR with GYNONC for removal of ovarian cyst and ventral hernia repair.     Plan:  - s/p ventral hernia repair and ovarian cystectomy 9/6, appreciate gyn onc recs, f/u with medicine at discharge  - s/p dental assessment and tooth extraction  - s/p cath 9/5 w/  Dr. Belcher - no CAD  - d/c NGT, advance to [facility]  - Continue to monitor GI function  - PT: rehab facility, case management aware and patient amenable  - dvt ppx to BID LVX 30mg sq    Dispo pending authorization    B Team Surgery  [id number]

## 2019-09-09 ENCOUNTER — TRANSCRIPTION ENCOUNTER (OUTPATIENT)
Age: 69
End: 2019-09-09

## 2019-09-09 VITALS
SYSTOLIC BLOOD PRESSURE: 140 MMHG | OXYGEN SATURATION: 97 % | TEMPERATURE: 98 F | DIASTOLIC BLOOD PRESSURE: 70 MMHG | HEART RATE: 87 BPM | RESPIRATION RATE: 18 BRPM

## 2019-09-09 RX ORDER — PANTOPRAZOLE SODIUM 20 MG/1
1 TABLET, DELAYED RELEASE ORAL
Qty: 20 | Refills: 0
Start: 2019-09-09 | End: 2019-09-28

## 2019-09-09 RX ORDER — OXYCODONE HYDROCHLORIDE 5 MG/1
1 TABLET ORAL
Qty: 12 | Refills: 0
Start: 2019-09-09 | End: 2019-09-10

## 2019-09-09 RX ORDER — LOSARTAN POTASSIUM 100 MG/1
1 TABLET, FILM COATED ORAL
Qty: 0 | Refills: 0 | DISCHARGE

## 2019-09-09 RX ADMIN — Medication 5 MILLIGRAM(S): at 05:53

## 2019-09-09 RX ADMIN — OXYCODONE HYDROCHLORIDE 10 MILLIGRAM(S): 5 TABLET ORAL at 17:00

## 2019-09-09 RX ADMIN — Medication 10 MILLIGRAM(S): at 13:30

## 2019-09-09 RX ADMIN — Medication 10 MILLIGRAM(S): at 06:23

## 2019-09-09 RX ADMIN — OXYCODONE HYDROCHLORIDE 10 MILLIGRAM(S): 5 TABLET ORAL at 15:58

## 2019-09-09 RX ADMIN — Medication 10 MILLIGRAM(S): at 18:55

## 2019-09-09 RX ADMIN — Medication 10 MILLIGRAM(S): at 12:33

## 2019-09-09 RX ADMIN — INFLUENZA VIRUS VACCINE 0.5 MILLILITER(S): 15; 15; 15; 15 SUSPENSION INTRAMUSCULAR at 16:22

## 2019-09-09 RX ADMIN — Medication 10 MILLIGRAM(S): at 05:53

## 2019-09-09 RX ADMIN — PANTOPRAZOLE SODIUM 40 MILLIGRAM(S): 20 TABLET, DELAYED RELEASE ORAL at 05:53

## 2019-09-09 RX ADMIN — NYSTATIN CREAM 1 APPLICATION(S): 100000 CREAM TOPICAL at 05:54

## 2019-09-09 NOTE — PROGRESS NOTE ADULT - SUBJECTIVE AND OBJECTIVE BOX
GENERAL SURGERY PROGRESS NOTE    STATUS POST:  ventral hernia repair and ovarian cystectomy   POST OPERATIVE DAY #: 3    INTERVAL EVENTS:   NGT removed, patient on soft mechanical diet and tolerating.    SUBJECTIVE  +/+ bowel function. Pain controlled, tolerating soft diet. Report she does not want to go to rehab and would prefer to go home with a visiting nurse which she has set up to see her every day.     10-point review of systems completed and negative except as noted above.      OBJECTIVE    MEDICATIONS  acetaminophen  IVPB .. 1000 milliGRAM(s) IV Intermittent every 6 hours  benzocaine 15 mG/menthol 3.6 mG Lozenge 1 Lozenge Oral three times a day  chlorhexidine 2% Cloths 1 Application(s) Topical daily  dextrose 40% Gel 15 Gram(s) Oral once PRN  dextrose 5% + sodium chloride 0.45% with potassium chloride 20 mEq/L 1000 milliLiter(s) IV Continuous <Continuous>  dextrose 50% Injectable 12.5 Gram(s) IV Push once  dextrose 50% Injectable 25 Gram(s) IV Push once  dextrose 50% Injectable 25 Gram(s) IV Push once  enalaprilat Injectable 1.25 milliGRAM(s) IV Push every 6 hours PRN  enoxaparin Injectable 30 milliGRAM(s) SubCutaneous two times a day  glucagon  Injectable 1 milliGRAM(s) IntraMuscular once PRN  HYDROmorphone  Injectable 0.5 milliGRAM(s) IV Push every 4 hours PRN  insulin lispro (HumaLOG) corrective regimen sliding scale   SubCutaneous every 6 hours  ketorolac   Injectable 15 milliGRAM(s) IV Push every 6 hours  nystatin Powder 1 Application(s) Topical every 12 hours  pantoprazole  Injectable 40 milliGRAM(s) IV Push daily      PHYSICAL EXAM  Vital Signs Last 24 Hrs  T(C): 36.9 (09 Sep 2019 05:48), Max: 37.1 (08 Sep 2019 18:19)  T(F): 98.4 (09 Sep 2019 05:48), Max: 98.8 (08 Sep 2019 18:19)  HR: 99 (09 Sep 2019 05:48) (92 - 108)  BP: 122/56 (09 Sep 2019 05:48) (119/54 - 153/61)  BP(mean): --  RR: 19 (09 Sep 2019 05:48) (18 - 20)  SpO2: 97% (09 Sep 2019 05:48) (94% - 98%)  General: Appears well, NAD  Neuro: AAOx3  CHEST: breathing comfortably  CV: appears well perfused  Abdomen: obese, soft, incision with subcutaneous closure, mild oozing, no surrounding erythema . Nondistended, no rebound or guarding  Extremities: Grossly symmetric        LABS                09-08    141  |  106  |  6<L>  ----------------------------<  130<H>  4.3   |  26  |  0.54    Ca    8.6      08 Sep 2019 05:53  Phos  2.5     09-08  Mg     2.0     09-08                            8.3    11.79 )-----------( 262      ( 08 Sep 2019 05:53 )             29.5

## 2019-09-09 NOTE — PROGRESS NOTE ADULT - SUBJECTIVE AND OBJECTIVE BOX
Subjective: Patient seen and examined. No new events except as noted.     SUBJECTIVE/ROS:  feels ok       MEDICATIONS:  MEDICATIONS  (STANDING):  benzocaine 15 mG/menthol 3.6 mG Lozenge 1 Lozenge Oral three times a day  chlorhexidine 2% Cloths 1 Application(s) Topical daily  dextrose 50% Injectable 12.5 Gram(s) IV Push once  dextrose 50% Injectable 25 Gram(s) IV Push once  dextrose 50% Injectable 25 Gram(s) IV Push once  enalapril 5 milliGRAM(s) Oral daily  enoxaparin Injectable 30 milliGRAM(s) SubCutaneous two times a day  influenza   Vaccine 0.5 milliLiter(s) IntraMuscular once  insulin lispro (HumaLOG) corrective regimen sliding scale   SubCutaneous Before meals and at bedtime  ketorolac 10 milliGRAM(s) Oral every 6 hours  nystatin Powder 1 Application(s) Topical every 12 hours  pantoprazole    Tablet 40 milliGRAM(s) Oral before breakfast      PHYSICAL EXAM:  T(C): 36.7 (09-09-19 @ 14:33), Max: 37.1 (09-08-19 @ 18:19)  HR: 96 (09-09-19 @ 14:33) (92 - 108)  BP: 138/60 (09-09-19 @ 14:33) (119/54 - 138/60)  RR: 18 (09-09-19 @ 14:33) (18 - 20)  SpO2: 99% (09-09-19 @ 14:33) (94% - 99%)  Wt(kg): --  I&O's Summary    08 Sep 2019 07:01  -  09 Sep 2019 07:00  --------------------------------------------------------  IN: 360 mL / OUT: 700 mL / NET: -340 mL            JVP: Normal  Neck: supple  Lung: clear   CV: S1 S2 , Murmur:  Abd: soft  Ext: No edema  neuro: Awake / alert  Psych: flat affect  Skin: normal``    LABS/DATA:    CARDIAC MARKERS:                                8.3    11.79 )-----------( 262      ( 08 Sep 2019 05:53 )             29.5     09-08    141  |  106  |  6<L>  ----------------------------<  130<H>  4.3   |  26  |  0.54    Ca    8.6      08 Sep 2019 05:53  Phos  2.5     09-08  Mg     2.0     09-08      proBNP:   Lipid Profile:   HgA1c:   TSH:     TELE:  EKG:

## 2019-09-09 NOTE — PROGRESS NOTE ADULT - ASSESSMENT
DM  Monitor finger stick. Insulin coverage. Diabetic education and Diabetic diet. Consider nutrition consultation.    HTN  cont enalapril

## 2019-09-09 NOTE — PROGRESS NOTE ADULT - ASSESSMENT
Assessment:  Pt is a 68F presenting with epigastric abdominal pain, with CT showing ventral hernia with small bowel and fluid in hernia sac and a large ovarian cyst 10cm with calcification. Now s/p OR with GYNONC for removal of ovarian cyst and ventral hernia repair.     Plan:  - s/p ventral hernia repair and ovarian cystectomy 9/6, cyst frozen benign  - s/p dental assessment and tooth extraction  - s/p cath 9/5 w/  Dr. Belcher - no CAD, f/u cardiology as OP  - Continue to monitor GI function  - PT: to reassess, initial recs are rehab however pt requesting home with home PT and home nursing, will discuss with case management  - dvt ppx to BID LVX 30mg sq      B Team Surgery  [id number]

## 2019-09-09 NOTE — DISCHARGE NOTE PROVIDER - CARE PROVIDERS DIRECT ADDRESSES
,DirectAddress_Unknown ,DirectAddress_Unknown,[email or web address]

## 2019-09-09 NOTE — DISCHARGE NOTE PROVIDER - NSDCCPCAREPLAN_GEN_ALL_CORE_FT
PRINCIPAL DISCHARGE DIAGNOSIS  Diagnosis: SBO (small bowel obstruction)  Assessment and Plan of Treatment: WOUND CARE:  Please keep incisions clean and dry. Please do not Scrub or rub incisions. Do not use lotion or powder on incisions.   BATHING: You may shower and/or sponge bathe. You may use warm soapy water in the shower and rinse, pat dry.  ACTIVITY: No heavy lifting or straining. Otherwise, you may return to your usual level of physical activity. If you are taking narcotic pain medication DO NOT drive a car, operate machinery or make important decisions.  DIET: Return to your usual diet.  NOTIFY YOUR SURGEON IF: You have any bleeding that does not stop, any pus draining from your wound(s), increased pain at surgical site, any fever (over 100.4 F) persistent nausea/vomiting, or if your pain is not controlled on your discharge pain medications.  Please follow up with your primary care physician in 1-2 weeks regarding your hospitalization.  Please follow up with your surgeon, Dr. Velasquez in 1 week. Please call office to make an appointment

## 2019-09-09 NOTE — DISCHARGE NOTE PROVIDER - NSDCCPTREATMENT_GEN_ALL_CORE_FT
PRINCIPAL PROCEDURE  Procedure: Repair, hernia, umbilical, open, adult  Findings and Treatment: primary

## 2019-09-09 NOTE — DISCHARGE NOTE NURSING/CASE MANAGEMENT/SOCIAL WORK - NSSCCARECORD_GEN_ALL_CORE
Home Care Agency/Durable Medical Equipment Agency Durable Medical Equipment Agency/Home Care Agency/Community [facility]

## 2019-09-09 NOTE — DISCHARGE NOTE NURSING/CASE MANAGEMENT/SOCIAL WORK - PATIENT PORTAL LINK FT
You can access the FollowMyHealth Patient Portal offered by Health system by registering at the following website: [email or web address]. By joining [facility]’s FollowMyHealth portal, you will also be able to view your health information using other applications (apps) compatible with our system.

## 2019-09-09 NOTE — PROGRESS NOTE ADULT - ATTENDING COMMENTS
Advanced care planning was discussed with patient and family.  Risks, benefits and alternatives of the cardiac treatments and medical therapy including procedures were discussed in detail and all questions were answered. Importance of compliance with medical therapy and lifestyle modification to improve cardiovascular health were addressed. Appropriate forms and patient educational materials were reviewed. 30 minutes face to face spent.
I saw and examined the patient. I was physically present for the key portions of the evaluation and management (E/M) service provided.  I agree with the above history, physical, and plan which I have reviewed and edited where appropriate.    Nikko Velasquez MD (Cell: [phone])  Acute and Critical Care Surgery    The Acute Care Surgery (B Team) Attending Group Practice:  Dr. Chelsea Delgadillo, Dr. Maycol Willams, Dr. Pennie Acevedo, Dr. Nikko Velasquez, and Dr. Chi Slater    Urgent issues - spectra [phone] or [phone]  Nonurgent issues - [phone]  Patient appointments or after hours - [phone]
Ms. Leblanc is recovering well from her exploratory laparotomy, cystectomy, and ventral hernia repair. She is ambulating, having GI function, and tolerating a diet. She is cleared from surgical perspective for discharge to home vs rehab based on patient/physical therapy recommendations. fercho
Pt recovering well s/p exlap, hernia repair.  -pain control, OOB to chair  -NPO/NGT for now, await GI function  -TPN for calorie supplementation if pt doesn't tolerate > 80% of caloric need.     Nikko Velasquez MD  Acute Care Surgery
agree    appreciate gyn onc input
D/c'd NGT  Pain control  OOB/Chair  Soft diet    Nikko Velasquez MD  Acute Care Surgery
umbilical hernia and SBO with adnexal mass for OR wednesday. Awaiting medical optimization and risk stratification.  Continue NGT, ivf  daily labs    I have personally interviewed and examined this patient, reviewed pertinent labs and imaging, and discussed the case with colleagues, residents, and physician assistants on B Team rounds.    The active care issues are:  1. Small bowel obstruction 2/2 to ventral hernia    The Acute Care Surgery (B Team) Attending Group Practice:  Dr. Chelsea Delgadillo, Dr. Maycol Willams, Dr. Pennie Acevedo, Dr. Nikko Velasquez, Dr. Chi Slater, Dr. Miko Sparks    urgent issues - spectra [phone] or [phone]  nonurgent issues - [phone]  patient appointments or afterhours - [phone]
I was physically present for the key portions of the evaluation and management (E/M) service provided.  I agree with the above history, physical, and plan which I have reviewed and edited where appropriate.     Plan discussed with resident.
Discharge planning per Surgery team.  Follow up with Dr. Rivero outpatient.    Jayla Rodas MD
Seen with F at ~130p. Pt's family present. Labs and flow reviewed. Pt with NGT out. Vernon some PO. Will follow with Surg team. Disc outpt f/u with me.

## 2019-09-09 NOTE — PROGRESS NOTE ADULT - SUBJECTIVE AND OBJECTIVE BOX
68y w PMH of *** p/w *** s/p *** POD#1    INTERVAL HPI/OVERNIGHT EVENTS: Pt seen and examined at bedside.  No events overnight. Pain well controlled.  Yesterday, NGT was discontinued, diet advanced.  Pt tolerating PO, denies nausea, vomiting.  Denies lightheadedness, dizziness, SOB, CP, fevers, chills.  Passing large amounts of flatus, voiding spontaneously.  Is out of bed and to chair, has not yet ambulated around hallway, waiting for her  to bring her sneakers.    MEDICATIONS  (STANDING):  benzocaine 15 mG/menthol 3.6 mG Lozenge 1 Lozenge Oral three times a day  chlorhexidine 2% Cloths 1 Application(s) Topical daily  dextrose 50% Injectable 12.5 Gram(s) IV Push once  dextrose 50% Injectable 25 Gram(s) IV Push once  dextrose 50% Injectable 25 Gram(s) IV Push once  enalapril 5 milliGRAM(s) Oral daily  enoxaparin Injectable 30 milliGRAM(s) SubCutaneous two times a day  influenza   Vaccine 0.5 milliLiter(s) IntraMuscular once  insulin lispro (HumaLOG) corrective regimen sliding scale   SubCutaneous Before meals and at bedtime  ketorolac 10 milliGRAM(s) Oral every 6 hours  nystatin Powder 1 Application(s) Topical every 12 hours  pantoprazole    Tablet 40 milliGRAM(s) Oral before breakfast    MEDICATIONS  (PRN):  dextrose 40% Gel 15 Gram(s) Oral once PRN Blood Glucose LESS THAN 70 milliGRAM(s)/deciliter  glucagon  Injectable 1 milliGRAM(s) IntraMuscular once PRN Glucose LESS THAN 70 milligrams/deciliter  oxyCODONE    IR 10 milliGRAM(s) Oral every 4 hours PRN Severe Pain (7 - 10)      12 point ROS negative except as outlined above    Vital Signs Last 24 Hrs  T(C): 36.9 (09 Sep 2019 05:48), Max: 37.1 (08 Sep 2019 18:19)  T(F): 98.4 (09 Sep 2019 05:48), Max: 98.8 (08 Sep 2019 18:19)  HR: 99 (09 Sep 2019 05:48) (92 - 108)  BP: 122/56 (09 Sep 2019 05:48) (119/54 - 153/61)  RR: 19 (09 Sep 2019 05:48) (18 - 20)  SpO2: 97% (09 Sep 2019 05:48) (94% - 98%)      PHYSICAL EXAM:    GA: NAD, A+0 x 3  CV: RRR  Pulm: CTA BL  Abd: +BS, soft, appropriately mildly tender to deep palpation, more in epigastrum, mildly distended, no rebound or guarding  Incision: midline vertical incision, dressing intact, clean and dry  Extremities: no swelling or calf tenderness          LABS:                        8.3    11.79 )-----------( 262      ( 08 Sep 2019 05:53 )             29.5     09-08    141  |  106  |  6<L>  ----------------------------<  130<H>  4.3   |  26  |  0.54    Ca    8.6      08 Sep 2019 05:53  Phos  2.5     09-08  Mg     2.0     09-08 68y w SBO, s/p ex-lap, BSO, ventral hernia repair (frozen benign) POD#3, HD#10    INTERVAL HPI/OVERNIGHT EVENTS: Pt seen and examined at bedside.  No events overnight. Pain well controlled.  Yesterday, NGT was discontinued, diet advanced.  Pt tolerating PO, denies nausea, vomiting.  Denies lightheadedness, dizziness, SOB, CP, fevers, chills.  Passing large amounts of flatus, voiding spontaneously.  Is out of bed and to chair, has not yet ambulated around hallway, waiting for her  to bring her sneakers.    MEDICATIONS  (STANDING):  benzocaine 15 mG/menthol 3.6 mG Lozenge 1 Lozenge Oral three times a day  chlorhexidine 2% Cloths 1 Application(s) Topical daily  dextrose 50% Injectable 12.5 Gram(s) IV Push once  dextrose 50% Injectable 25 Gram(s) IV Push once  dextrose 50% Injectable 25 Gram(s) IV Push once  enalapril 5 milliGRAM(s) Oral daily  enoxaparin Injectable 30 milliGRAM(s) SubCutaneous two times a day  influenza   Vaccine 0.5 milliLiter(s) IntraMuscular once  insulin lispro (HumaLOG) corrective regimen sliding scale   SubCutaneous Before meals and at bedtime  ketorolac 10 milliGRAM(s) Oral every 6 hours  nystatin Powder 1 Application(s) Topical every 12 hours  pantoprazole    Tablet 40 milliGRAM(s) Oral before breakfast    MEDICATIONS  (PRN):  dextrose 40% Gel 15 Gram(s) Oral once PRN Blood Glucose LESS THAN 70 milliGRAM(s)/deciliter  glucagon  Injectable 1 milliGRAM(s) IntraMuscular once PRN Glucose LESS THAN 70 milligrams/deciliter  oxyCODONE    IR 10 milliGRAM(s) Oral every 4 hours PRN Severe Pain (7 - 10)      12 point ROS negative except as outlined above    Vital Signs Last 24 Hrs  T(C): 36.9 (09 Sep 2019 05:48), Max: 37.1 (08 Sep 2019 18:19)  T(F): 98.4 (09 Sep 2019 05:48), Max: 98.8 (08 Sep 2019 18:19)  HR: 99 (09 Sep 2019 05:48) (92 - 108)  BP: 122/56 (09 Sep 2019 05:48) (119/54 - 153/61)  RR: 19 (09 Sep 2019 05:48) (18 - 20)  SpO2: 97% (09 Sep 2019 05:48) (94% - 98%)      PHYSICAL EXAM:    GA: NAD, A+0 x 3  CV: RRR  Pulm: CTA BL  Abd: +BS, soft, appropriately mildly tender to deep palpation, more in epigastrum, mildly distended, no rebound or guarding  Incision: midline vertical incision, dressing intact, clean and dry  Extremities: no swelling or calf tenderness          LABS:                        8.3    11.79 )-----------( 262      ( 08 Sep 2019 05:53 )             29.5     09-08    141  |  106  |  6<L>  ----------------------------<  130<H>  4.3   |  26  |  0.54    Ca    8.6      08 Sep 2019 05:53  Phos  2.5     09-08  Mg     2.0     09-08

## 2019-09-09 NOTE — DISCHARGE NOTE PROVIDER - HOSPITAL COURSE
69yo F with MHx of morbid obesity (BMI 50), DM2, HTN, and known ventral hernia presented 8/30 from rehab after previous admission on 8/10 for SBO (d/c'd on 8/16) with c/c of worsening intermittant abd pain x3 days associated with nausea, multiple episodes of NBNB vomiting, and PO intolerance. SBO on 8/10 resolved resolved with nonoperative mngt and she was discharged to rehab. In rehab patient's last flatus was on 8/27 and last BM on 8/28 after an enema. No BM or flatus thereafter. Xray was taken at rehab and patient was transferred to [facility] ER for concern of SBO.        In the ED, CT showed SBO with transition point in the known ventral hernia similar to prior admission and redemonstration of ~18cm left adnexal mass. An NGT was subsequently placed. Hernia not repaired on previous admission due to body habitus and adnexal mass. Patient denies dizziness, CP, SOB, or dysuria.        On 9/3 pt required multiple teeth be extracted prior to surgery.         9/4 NGT removed and pt advanced to CLD. In the evening pt had increased abdominal pain and distention so NGt was replaced.         9/5 cardiology was consulted for elevated surgical risk. Her outside records were reviewed which reveled a LARGE area of ischemic myocardium. Pt underwent a cardiac cath which showed        CORONARY VESSELS: The coronary circulation is right dominant.    LM:   --  LM: Normal.    LAD:   --  LAD: Normal.    CX:   --  Circumflex: Normal.    RCA:   --  RCA: Normal. 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            On 9/6 pt went to OR for primary repair of umbilical hernia and  removal of adnexal masses by Gynecology        On 9/8 pts NGt was removed and her diet was slowly advanced as tolerated.         Pt was assessed by PT who recommended rehab.             At this time, pt is tolerating a regular diet, ambulating and voiding.  Pt has been deemed stable for discharge at this time. 67yo F with MHx of morbid obesity (BMI 50), DM2, HTN, and known ventral hernia presented 8/30 from rehab after previous admission on 8/10 for SBO (d/c'd on 8/16) with c/c of worsening intermittant abd pain x3 days associated with nausea, multiple episodes of NBNB vomiting, and PO intolerance. SBO on 8/10 resolved resolved with nonoperative mngt and she was discharged to rehab. In rehab patient's last flatus was on 8/27 and last BM on 8/28 after an enema. No BM or flatus thereafter. Xray was taken at rehab and patient was transferred to [facility] ER for concern of SBO.        In the ED, CT showed SBO with transition point in the known ventral hernia similar to prior admission and redemonstration of ~18cm left adnexal mass. An NGT was subsequently placed. Hernia not repaired on previous admission due to body habitus and adnexal mass. Patient denies dizziness, CP, SOB, or dysuria.        On 9/3 pt required multiple teeth be extracted prior to surgery.         9/4 NGT removed and pt advanced to CLD. In the evening pt had increased abdominal pain and distention so NGt was replaced.         9/5 cardiology was consulted for elevated surgical risk. Her outside records were reviewed which reveled a LARGE area of ischemic myocardium. Pt underwent a cardiac cath which showed        CORONARY VESSELS: The coronary circulation is right dominant.    LM:   --  LM: Normal.    LAD:   --  LAD: Normal.    CX:   --  Circumflex: Normal.    RCA:   --  RCA: Normal. 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            On 9/6 pt went to OR for primary repair of umbilical hernia and  removal of adnexal masses by Gynecology        On 9/8 pts NGt was removed and her diet was slowly advanced as tolerated.         Pt was assessed by PT who recommended home with home PT.             At this time, pt is tolerating a regular diet, ambulating and voiding.  Pt has been deemed stable for discharge at this time by attending.

## 2019-09-09 NOTE — PROGRESS NOTE ADULT - REASON FOR ADMISSION
Small Bowel Obstruction
abn pain, Small Bowel Obstruction
Small Bowel Obstruction

## 2019-09-09 NOTE — DISCHARGE NOTE PROVIDER - CARE PROVIDER_API CALL
Nikko Velasquez)  Surgery; Surgical Critical Care  [address], [address]  [place] [address]  Phone: [phone]  Fax: [phone]  Follow Up Time: 1 week Nikko Velasquez)  Surgery; Surgical Critical Care  [address], [address]  [place] [address]  Phone: [phone]  Fax: [phone]  Follow Up Time: 1 week    Chi Rivero)  Gynecologic Oncology; Obstetrics and Gynecology  [address] [place] [address]  Phone: [phone]  Fax: [phone]  Follow Up Time: 1 week

## 2019-09-09 NOTE — DISCHARGE NOTE PROVIDER - PROVIDER TOKENS
PROVIDER:[TOKEN:[[id number]],FOLLOWUP:[1 week]] PROVIDER:[TOKEN:[[id number]],FOLLOWUP:[1 week]],PROVIDER:[TOKEN:[[id number]],FOLLOWUP:[1 week]]

## 2019-09-09 NOTE — PROGRESS NOTE ADULT - PROVIDER SPECIALTY LIST ADULT
Anesthesia
Cardiology
Gyn Onc
Internal Medicine
Surgery
Internal Medicine
Surgery

## 2019-09-09 NOTE — PROGRESS NOTE ADULT - ASSESSMENT
A/P: 68 year old admitted with partial SBO and adnexal mass s/p ex-lap BSO (frozen benign), ventral hernia repair.  Stable.  HD#10, POD#3.  Neuro: c/w pain meds prn  CV: mild tachycardia overnight to 108, now back to 90s, BPs normotensive, continue to monitor VS, f/u AM CBC  Pulm: Saturating well on room air, encourage incentive spirometry  GI: NGT discontinued yesterday, tolerating mechanical soft diet  : UOP adequate, d/c ramires  ID: afebrile, f/u am CBC  Heme: c/w lovenox, encourage ambulation for VTE ppx  Dispo: Continue routine post-op care, c/w care per primary team    ISA Kwong PGY3 A/P: 68 year old admitted with partial SBO and adnexal mass s/p ex-lap BSO (frozen benign), ventral hernia repair.  Stable.  HD#10, POD#3.  Neuro: c/w pain meds prn  CV: mild tachycardia overnight to 108, now back to 90s, BPs normotensive, continue to monitor VS, f/u AM CBC  Pulm: Saturating well on room air, encourage incentive spirometry  GI: NGT discontinued yesterday, tolerating mechanical soft diet, c/w protonix  : voiding without issue  ID: afebrile, f/u am CBC  Heme: c/w lovenox, encourage ambulation for VTE ppx  Endo: c/w FSG, ISS  Dispo: Continue routine post-op care, c/w care per primary team    ISA Kwong PGY3 A/P: 68 year old admitted with partial SBO and adnexal mass s/p ex-lap BSO (frozen benign), ventral hernia repair.  Stable.  HD#10, POD#3.    -encourage po intake  -encourage ambulation  -DVT ppx  -excellent care per primary team    ISA Kwong PGY3

## 2019-09-10 ENCOUNTER — INBOUND DOCUMENT (OUTPATIENT)
Age: 69
End: 2019-09-10

## 2019-09-16 LAB — SURGICAL PATHOLOGY STUDY: SIGNIFICANT CHANGE UP

## 2019-10-09 ENCOUNTER — APPOINTMENT (OUTPATIENT)
Dept: SURGERY | Facility: CLINIC | Age: 69
End: 2019-10-09
Payer: MEDICARE

## 2019-10-09 VITALS
WEIGHT: 292 LBS | HEIGHT: 67 IN | DIASTOLIC BLOOD PRESSURE: 93 MMHG | HEART RATE: 94 BPM | SYSTOLIC BLOOD PRESSURE: 182 MMHG | BODY MASS INDEX: 45.83 KG/M2

## 2019-10-09 VITALS — TEMPERATURE: 98.5 F

## 2019-10-09 PROCEDURE — 99024 POSTOP FOLLOW-UP VISIT: CPT

## 2019-10-09 NOTE — PHYSICAL EXAM
[de-identified] : Soft, nontender, nondistended. The incision is healed well without signs of erythema, cellulitis or drainage. No palpable hernia formation.\par \par  [de-identified] : Well ,comfortable.

## 2019-10-09 NOTE — PLAN
[FreeTextEntry1] : Mrs. Leblanc did well with her procedure and has recovered well.\par Small wound dehiscence in middle of incision, wound pack 1x per day and follow up in 2 weeks.\par Increase nutrition and activity (exercise is encouraged as well as weight loss) \par Higher than normal chance for hernia recurrence and at that time would benefit from a laparoscopic repair. \par Pt can follow up if she notices her midline incision bulging indicated a recurrence. \par \par I spent 15min reviewing data, images and information. Greater than 50% of my time was spent in face to face discussion regarding wound healing, postoperative diet and activity.\par \par Nikko Velasquez MD\par Acute Care Surgery\par

## 2019-10-09 NOTE — HISTORY OF PRESENT ILLNESS
[de-identified] : Mrs Leblanc is s/p hernia repair for an incarcerated hernia and ovarian cystectomy. She has recovered well. During her operation the hernia was clean, without obstruction and was closed with #1 looped PDS and no mesh.\par She has recovered well and denies fevers, chills, abdominal pain, SOB/dyspnea or weakness/fatigue. Tolerating adequate PO intake and GI activity (bowel movements) has recovered without constipation or diarrhea. No complaints regarding incisions and dressings.\par

## 2019-10-26 ENCOUNTER — EMERGENCY (EMERGENCY)
Facility: HOSPITAL | Age: 69
LOS: 1 days | Discharge: HOME | End: 2019-10-26
Attending: STUDENT IN AN ORGANIZED HEALTH CARE EDUCATION/TRAINING PROGRAM
Payer: MEDICARE

## 2019-10-26 VITALS
OXYGEN SATURATION: 99 % | DIASTOLIC BLOOD PRESSURE: 52 MMHG | TEMPERATURE: 98 F | SYSTOLIC BLOOD PRESSURE: 125 MMHG | HEART RATE: 89 BPM | RESPIRATION RATE: 16 BRPM

## 2019-10-26 VITALS
TEMPERATURE: 98 F | OXYGEN SATURATION: 99 % | DIASTOLIC BLOOD PRESSURE: 67 MMHG | HEART RATE: 92 BPM | RESPIRATION RATE: 18 BRPM | SYSTOLIC BLOOD PRESSURE: 139 MMHG

## 2019-10-26 LAB
ALBUMIN SERPL ELPH-MCNC: 3.3 G/DL — SIGNIFICANT CHANGE UP (ref 3.3–5)
ALP SERPL-CCNC: 164 U/L — HIGH (ref 40–120)
ALT FLD-CCNC: 10 U/L — SIGNIFICANT CHANGE UP (ref 4–33)
ANION GAP SERPL CALC-SCNC: 14 MMO/L — SIGNIFICANT CHANGE UP (ref 7–14)
AST SERPL-CCNC: 15 U/L — SIGNIFICANT CHANGE UP (ref 4–32)
BASE EXCESS BLDV CALC-SCNC: 5.9 MMOL/L — SIGNIFICANT CHANGE UP
BASOPHILS # BLD AUTO: 0.02 K/UL — SIGNIFICANT CHANGE UP (ref 0–0.2)
BASOPHILS NFR BLD AUTO: 0.3 % — SIGNIFICANT CHANGE UP (ref 0–2)
BILIRUB SERPL-MCNC: 0.3 MG/DL — SIGNIFICANT CHANGE UP (ref 0.2–1.2)
BLOOD GAS VENOUS - CREATININE: 0.45 MG/DL — LOW (ref 0.5–1.3)
BLOOD GAS VENOUS - FIO2: 21 — SIGNIFICANT CHANGE UP
BUN SERPL-MCNC: 12 MG/DL — SIGNIFICANT CHANGE UP (ref 7–23)
CALCIUM SERPL-MCNC: 9.3 MG/DL — SIGNIFICANT CHANGE UP (ref 8.4–10.5)
CHLORIDE BLDV-SCNC: 101 MMOL/L — SIGNIFICANT CHANGE UP (ref 96–108)
CHLORIDE SERPL-SCNC: 99 MMOL/L — SIGNIFICANT CHANGE UP (ref 98–107)
CO2 SERPL-SCNC: 27 MMOL/L — SIGNIFICANT CHANGE UP (ref 22–31)
CREAT SERPL-MCNC: 0.48 MG/DL — LOW (ref 0.5–1.3)
EOSINOPHIL # BLD AUTO: 0.11 K/UL — SIGNIFICANT CHANGE UP (ref 0–0.5)
EOSINOPHIL NFR BLD AUTO: 1.7 % — SIGNIFICANT CHANGE UP (ref 0–6)
GAS PNL BLDV: 140 MMOL/L — SIGNIFICANT CHANGE UP (ref 136–146)
GLUCOSE BLDV-MCNC: 152 MG/DL — HIGH (ref 70–99)
GLUCOSE SERPL-MCNC: 149 MG/DL — HIGH (ref 70–99)
GRAM STN WND: SIGNIFICANT CHANGE UP
HCO3 BLDV-SCNC: 29 MMOL/L — HIGH (ref 20–27)
HCT VFR BLD CALC: 27.8 % — LOW (ref 34.5–45)
HCT VFR BLDV CALC: 26.8 % — LOW (ref 34.5–45)
HGB BLD-MCNC: 8.1 G/DL — LOW (ref 11.5–15.5)
HGB BLDV-MCNC: 8.6 G/DL — LOW (ref 11.5–15.5)
IMM GRANULOCYTES NFR BLD AUTO: 0.5 % — SIGNIFICANT CHANGE UP (ref 0–1.5)
LACTATE BLDV-MCNC: 1.6 MMOL/L — SIGNIFICANT CHANGE UP (ref 0.5–2)
LIDOCAIN IGE QN: 23.2 U/L — SIGNIFICANT CHANGE UP (ref 7–60)
LYMPHOCYTES # BLD AUTO: 1.35 K/UL — SIGNIFICANT CHANGE UP (ref 1–3.3)
LYMPHOCYTES # BLD AUTO: 20.3 % — SIGNIFICANT CHANGE UP (ref 13–44)
MCHC RBC-ENTMCNC: 23.2 PG — LOW (ref 27–34)
MCHC RBC-ENTMCNC: 29.1 % — LOW (ref 32–36)
MCV RBC AUTO: 79.7 FL — LOW (ref 80–100)
MONOCYTES # BLD AUTO: 0.52 K/UL — SIGNIFICANT CHANGE UP (ref 0–0.9)
MONOCYTES NFR BLD AUTO: 7.8 % — SIGNIFICANT CHANGE UP (ref 2–14)
NEUTROPHILS # BLD AUTO: 4.63 K/UL — SIGNIFICANT CHANGE UP (ref 1.8–7.4)
NEUTROPHILS NFR BLD AUTO: 69.4 % — SIGNIFICANT CHANGE UP (ref 43–77)
NRBC # FLD: 0 K/UL — SIGNIFICANT CHANGE UP (ref 0–0)
PCO2 BLDV: 51 MMHG — SIGNIFICANT CHANGE UP (ref 41–51)
PH BLDV: 7.4 PH — SIGNIFICANT CHANGE UP (ref 7.32–7.43)
PLATELET # BLD AUTO: 313 K/UL — SIGNIFICANT CHANGE UP (ref 150–400)
PMV BLD: 10.1 FL — SIGNIFICANT CHANGE UP (ref 7–13)
PO2 BLDV: 48 MMHG — HIGH (ref 35–40)
POTASSIUM BLDV-SCNC: 3.6 MMOL/L — SIGNIFICANT CHANGE UP (ref 3.4–4.5)
POTASSIUM SERPL-MCNC: 3.8 MMOL/L — SIGNIFICANT CHANGE UP (ref 3.5–5.3)
POTASSIUM SERPL-SCNC: 3.8 MMOL/L — SIGNIFICANT CHANGE UP (ref 3.5–5.3)
PROT SERPL-MCNC: 7.8 G/DL — SIGNIFICANT CHANGE UP (ref 6–8.3)
RBC # BLD: 3.49 M/UL — LOW (ref 3.8–5.2)
RBC # FLD: 15.4 % — HIGH (ref 10.3–14.5)
SAO2 % BLDV: 82 % — SIGNIFICANT CHANGE UP (ref 60–85)
SODIUM SERPL-SCNC: 140 MMOL/L — SIGNIFICANT CHANGE UP (ref 135–145)
SPECIMEN SOURCE: SIGNIFICANT CHANGE UP
WBC # BLD: 6.66 K/UL — SIGNIFICANT CHANGE UP (ref 3.8–10.5)
WBC # FLD AUTO: 6.66 K/UL — SIGNIFICANT CHANGE UP (ref 3.8–10.5)

## 2019-10-26 PROCEDURE — 99285 EMERGENCY DEPT VISIT HI MDM: CPT | Mod: GC

## 2019-10-26 PROCEDURE — 74177 CT ABD & PELVIS W/CONTRAST: CPT | Mod: 26

## 2019-10-26 RX ORDER — ACETAMINOPHEN 500 MG
650 TABLET ORAL ONCE
Refills: 0 | Status: COMPLETED | OUTPATIENT
Start: 2019-10-26 | End: 2019-10-26

## 2019-10-26 RX ORDER — MORPHINE SULFATE 50 MG/1
4 CAPSULE, EXTENDED RELEASE ORAL ONCE
Refills: 0 | Status: DISCONTINUED | OUTPATIENT
Start: 2019-10-26 | End: 2019-10-26

## 2019-10-26 RX ORDER — OXYCODONE HYDROCHLORIDE 5 MG/1
1 TABLET ORAL
Qty: 9 | Refills: 0
Start: 2019-10-26 | End: 2019-10-28

## 2019-10-26 RX ADMIN — Medication 650 MILLIGRAM(S): at 10:40

## 2019-10-26 RX ADMIN — MORPHINE SULFATE 4 MILLIGRAM(S): 50 CAPSULE, EXTENDED RELEASE ORAL at 13:12

## 2019-10-26 RX ADMIN — MORPHINE SULFATE 4 MILLIGRAM(S): 50 CAPSULE, EXTENDED RELEASE ORAL at 12:48

## 2019-10-26 RX ADMIN — Medication 650 MILLIGRAM(S): at 12:36

## 2019-10-26 NOTE — ED ADULT NURSE NOTE - OBJECTIVE STATEMENT
Pt awake and alert arrives with abdominal surgical site draining yellow in color with odor, pt reports pain , clean dressing placed over site, tylenol given for pain , iv placed to left wrist. Pts blood  work colleted, rectal temp 99. Pts skin intact. Pt now awaiting abdominal ct.

## 2019-10-26 NOTE — PROVIDER CONTACT NOTE (OTHER) - ASSESSMENT
SW contacted by medical team ANGELA Montoya, Pt requires assistance to return home. SW met with Pt, confirmed Pt's address. Pt requires ambulance to return home.  Pt lives with family, private house 3 steps in, Pt weighs 298lbs.  SW contacted [facility] [phone] spoke with staff Tram who confirmed ambulance via [facility] by 4:30 p.m. Non-Emergent Ambulance Form completed and in chart for transport team. SW informed Pt, family at bedside and medical team. All questions answered to Pt and Family satisfaction.  No further SW intervention required at this time.

## 2019-10-26 NOTE — CONSULT NOTE ADULT - SUBJECTIVE AND OBJECTIVE BOX
SURGERY CONSULT NOTE    Chief Complaint: pus coming from wound mixed with small amount of clot  HPI: 69F with recent umbilical hernia repair and adnexal resection, presents with persistent pus draining from umbilical wound. Was recently seen as outpatient in surgery office and was instructed to continue packing wound with assistance of home health nurse. Patient and her sister (who is at bedside) state that the pus is foul-smelling and sometimes has a small amount of blood mixed in. They state that they are not changing the wound and only the home health nurse is touching it. No HA, CP, SOB, No F/C/N/V/D/C.    PAST MEDICAL & SURGICAL HISTORY:  SBO (small bowel obstruction)  CAD (coronary artery disease)  DM (diabetes mellitus)  HTN (hypertension)  No significant past surgical history    MEDICATIONS  (STANDING):    MEDICATIONS  (PRN):    Allergies    No Known Allergies    Intolerances    FAMILY HISTORY:    Vital Signs Last 24 Hrs  T(C): 36.9 (26 Oct 2019 12:30), Max: 37.2 (26 Oct 2019 10:44)  T(F): 98.4 (26 Oct 2019 12:30), Max: 99 (26 Oct 2019 10:44)  HR: 92 (26 Oct 2019 12:30) (84 - 92)  BP: 139/67 (26 Oct 2019 12:30) (125/52 - 139/67)  BP(mean): --  RR: 18 (26 Oct 2019 12:30) (16 - 18)  SpO2: 99% (26 Oct 2019 12:30) (99% - 99%)      Physical Exam    PHYSICAL EXAM:  General: NAD, lying in bed comfortably  Neuro: AAO  HEENT: NC/AT, EOMI  Neck: Soft, supple  Resp: Good effort, no respiratory distress  GI/Abd: Soft, NTND, morbid obesity. no rebound/guarding, no masses palpated. Umbilical wound with foul-smelling pus draining easily. Probed with cotton-tipped swab, fascia intact. Small area of raw skin to left-lateral side of umbilical wound which is likely source of blood visualized by patient. Minimally tender. No RUQ TTP  Vascular: B/l radial pulses palpable, b/l DP/PT palpable, no palpable abdominal pulsatile mass      LABS                        8.1    6.66  )-----------( 313      ( 26 Oct 2019 10:15 )             27.8     10-26    140  |  99  |  12  ----------------------------<  149<H>  3.8   |  27  |  0.48<L>    Ca    9.3      26 Oct 2019 10:15    TPro  7.8  /  Alb  3.3  /  TBili  0.3  /  DBili  x   /  AST  15  /  ALT  10  /  AlkPhos  164<H>  10-26    LIVER FUNCTIONS - ( 26 Oct 2019 10:15 )  Alb: 3.3 g/dL / Pro: 7.8 g/dL / ALK PHOS: 164 u/L / ALT: 10 u/L / AST: 15 u/L / GGT: x             RADIOLOGY & ADDITIONAL STUDIES:    IMPRESSION:     Status post umbilical hernia repair with packing material within the   anterior abdominal wall open wound. No intraperitoneal extension or fluid   collection.    Indeterminant bilateral adrenal lesions. Consider further evaluation with   adrenal MRI.    Cholelithiasis.      Assessment/Plan: 69F with superficial wound infection, currently drained with no fevers, no intra-abdominal source of infection, and no leukocytosis.    1. No need for urgent surgical intervention  2. Wound continue home health care nurse with daily (or more) dressing changes with packing.  3. We packed the wound with 2" iodoform packing and instructed the patient to continue dressing changes as needed  4. Discussed with Dr. Velasquez. The patient has another outpatient visit scheduled with him for this upcoming Wednesday.

## 2019-10-26 NOTE — ED PROVIDER NOTE - PROGRESS NOTE DETAILS
surg consulted  - Jamie Montoya D.O. PGY2 pt evaluated by surgery, states no abx at this time. pt has fu outpatient w/ dr heard this week. pt has no rectal temp, no white count, will not start abx at this time.  - Jamie Montoya D.O. PGY2

## 2019-10-26 NOTE — ED PROVIDER NOTE - NS ED ROS FT
GENERAL: No fever or chills, //             EYES: no change in vision, //             HEENT: no trouble swallowing or speaking, //             CARDIAC: no chest pain, //              PULMONARY: no cough or SOB, //             GI: abd pain, drainage from wound site, //             : No changes in urination,  //            SKIN: no rashes,  //            NEURO: no headache,  //             MSK: No joint pain otherwise as HPI or negative. ~Jamie Montoya DO PGY2

## 2019-10-26 NOTE — ED PROVIDER NOTE - NSFOLLOWUPINSTRUCTIONS_ED_ALL_ED_FT
1) Please follow up with Dr. Velasquez at your appointment this Wednesday 12/30/19  2) Seek immediate medical care for any new or returning symptoms including but not limited severe pain, high fevers  3) Take Tylenol 650 mg every 4-6 hours as needed for pain. Do not take more than 2 grams within a 24 hour period  4) Take Oxycodone 5 mg up to every 8 hours as needed for breakthrough pain. Do not drive or make critical decisions when taking this medication.

## 2019-10-26 NOTE — ED PROVIDER NOTE - PHYSICAL EXAMINATION
General: nad  HEENT: EOMI, PERRLA, normal mucosa, normal oropharynx, no lesions on the lips or on oral mucosa, normal external ear  Neck: supple, no lymphadenopathy, full range of motion, no nuchal rigidity  CV: RRR  Resp: non labored breathing  Abd: non-distended, soft, foul smelling wound site with packing, ttp around wound site  : no CVA tenderness  MSK: full range of motion, no cyanosis, no edema, no clubbing, no immobility  Neuro: CN II-XII grossly intact  Skin: no rashes

## 2019-10-26 NOTE — ED PROVIDER NOTE - ATTENDING CONTRIBUTION TO CARE
I performed a history and physical exam of the patient and discussed their management with the resident.  I reviewed the resident's note and agree with the documented findings and plan of care except as noted below. My medical decision making and observations are as follows:    69F pmh morbid obesity (BMI 50), DM2, HTN, s/p primary repair of umbilical hernia and removal of adnexal masses on 9/6/19 p/w worsening purulent drainage from surgical site. pt states approx 2 weeks after procedure drainage from site became purulent, foul smelling, and mixed with blood.  she has been getting wound packing and daily dressing changes but visiting RN states it is getting worse and instructed her to go to ER.  Pt is tearful and frustrated about the pain and the smell of her wound.  States chills at night.  denies fevers, chest pain, shortness of breath, nausea/vomiting/diarrhea.  Pt is having normal BMs.  Pt A&O x 3, heart rrr, lungs cta, abd obese soft mild ttp around wound with packing in place with foul smell.  Concern for deeper wound infection - will check labs, cultures, CT abd and contact her surgeon.

## 2019-10-26 NOTE — ED PROVIDER NOTE - OBJECTIVE STATEMENT
70 yo f pmh morbid obesity (BMI 50), DM2, HTN, s/p primary repair of umbilical hernia and removal of adnexal masses on 9/6/19, pw purulent drainage from surgical site. pt states approx 2 weeks after procedure drainage from site became purulent, foul smelling, and mixed with blood. pt states she has fu w/ surgery and was told to do washouts and change wound dressings. pt has visiting nurse who has been doing dressing changes. pt states area is painful, localized. denies n/v, f/c, cp, sob. reports having BMs, passing gas.

## 2019-10-26 NOTE — ED PROVIDER NOTE - PATIENT PORTAL LINK FT
You can access the FollowMyHealth Patient Portal offered by [facility] by registering at the following website: [email or web address]. By joining [facility]’s FollowMyHealth portal, you will also be able to view your health information using other applications (apps) compatible with our system.

## 2019-10-26 NOTE — ED ADULT NURSE REASSESSMENT NOTE - NS ED NURSE REASSESS COMMENT FT1
Received report from RN, pt, A&OXx4, resting with family at the bedside. Abdomen soft, round, and tender near surgical site. Pt complains of pain at the surgical wound. VS as noted. Pt denies chest pain, SOB, N/V, fever, and chills at this time. Surgery MD at the bedside. Will continue to monitor.

## 2019-10-26 NOTE — ED PROVIDER NOTE - CLINICAL SUMMARY MEDICAL DECISION MAKING FREE TEXT BOX
68 yo f pw drainage following umbilical hernia repair one month prior. labs, imaging. sx control. likely surg consult. reassess.

## 2019-10-27 LAB
SPECIMEN SOURCE: SIGNIFICANT CHANGE UP

## 2019-10-28 LAB
METHOD TYPE: SIGNIFICANT CHANGE UP
ORGANISM # SPEC MICROSCOPIC CNT: SIGNIFICANT CHANGE UP

## 2019-10-29 LAB
-  AMIKACIN: SIGNIFICANT CHANGE UP
-  AMIKACIN: SIGNIFICANT CHANGE UP
-  AMPICILLIN/SULBACTAM: SIGNIFICANT CHANGE UP
-  AMPICILLIN/SULBACTAM: SIGNIFICANT CHANGE UP
-  AMPICILLIN: SIGNIFICANT CHANGE UP
-  AZTREONAM: SIGNIFICANT CHANGE UP
-  AZTREONAM: SIGNIFICANT CHANGE UP
-  CEFAZOLIN: SIGNIFICANT CHANGE UP
-  CEFAZOLIN: SIGNIFICANT CHANGE UP
-  CEFEPIME: SIGNIFICANT CHANGE UP
-  CEFEPIME: SIGNIFICANT CHANGE UP
-  CEFOXITIN: SIGNIFICANT CHANGE UP
-  CEFOXITIN: SIGNIFICANT CHANGE UP
-  CEFTAZIDIME: SIGNIFICANT CHANGE UP
-  CEFTAZIDIME: SIGNIFICANT CHANGE UP
-  CEFTRIAXONE: SIGNIFICANT CHANGE UP
-  CEFTRIAXONE: SIGNIFICANT CHANGE UP
-  CIPROFLOXACIN: SIGNIFICANT CHANGE UP
-  ERTAPENEM: SIGNIFICANT CHANGE UP
-  ERTAPENEM: SIGNIFICANT CHANGE UP
-  GENTAMICIN: SIGNIFICANT CHANGE UP
-  GENTAMICIN: SIGNIFICANT CHANGE UP
-  IMIPENEM: SIGNIFICANT CHANGE UP
-  LEVOFLOXACIN: SIGNIFICANT CHANGE UP
-  LEVOFLOXACIN: SIGNIFICANT CHANGE UP
-  MEROPENEM: SIGNIFICANT CHANGE UP
-  MEROPENEM: SIGNIFICANT CHANGE UP
-  PIPERACILLIN/TAZOBACTAM: SIGNIFICANT CHANGE UP
-  PIPERACILLIN/TAZOBACTAM: SIGNIFICANT CHANGE UP
-  TETRACYCLINE: SIGNIFICANT CHANGE UP
-  TIGECYCLINE: SIGNIFICANT CHANGE UP
-  TOBRAMYCIN: SIGNIFICANT CHANGE UP
-  TOBRAMYCIN: SIGNIFICANT CHANGE UP
-  TRIMETHOPRIM/SULFAMETHOXAZOLE: SIGNIFICANT CHANGE UP
-  TRIMETHOPRIM/SULFAMETHOXAZOLE: SIGNIFICANT CHANGE UP
-  VANCOMYCIN: SIGNIFICANT CHANGE UP
BACTERIA WND CULT: SIGNIFICANT CHANGE UP
METHOD TYPE: SIGNIFICANT CHANGE UP
METHOD TYPE: SIGNIFICANT CHANGE UP

## 2019-10-29 NOTE — ED POST DISCHARGE NOTE - RESULT SUMMARY
WCX: Adrienne valdezi and proteus mirabilis. No antibiotic listed in ED provider note or prescription writer at time of discharge. Patient contacted discussed with patient can ERX Abx. Patient has follow up with Surgeon tomorrow but would like Abx to be started today. Discussed with patient will ERX Augmentin 875/125 PO BID X 7 days. Patient to discussed with Surgeon tomorrow if she should continue Abx. Discussed with patient need to return to ED if symptoms don't continue to improve or recur or develops any new or worsening symptoms that are of concern.

## 2019-10-30 ENCOUNTER — APPOINTMENT (OUTPATIENT)
Dept: SURGERY | Facility: CLINIC | Age: 69
End: 2019-10-30
Payer: MEDICARE

## 2019-10-30 VITALS
BODY MASS INDEX: 45.83 KG/M2 | TEMPERATURE: 97.7 F | WEIGHT: 292 LBS | SYSTOLIC BLOOD PRESSURE: 172 MMHG | DIASTOLIC BLOOD PRESSURE: 84 MMHG | HEART RATE: 100 BPM | HEIGHT: 67 IN

## 2019-10-30 LAB
-  AMIKACIN: SIGNIFICANT CHANGE UP
-  AMIKACIN: SIGNIFICANT CHANGE UP
-  AMPICILLIN/SULBACTAM: SIGNIFICANT CHANGE UP
-  AMPICILLIN/SULBACTAM: SIGNIFICANT CHANGE UP
-  AMPICILLIN: SIGNIFICANT CHANGE UP
-  AZTREONAM: SIGNIFICANT CHANGE UP
-  AZTREONAM: SIGNIFICANT CHANGE UP
-  CEFAZOLIN: SIGNIFICANT CHANGE UP
-  CEFAZOLIN: SIGNIFICANT CHANGE UP
-  CEFEPIME: SIGNIFICANT CHANGE UP
-  CEFEPIME: SIGNIFICANT CHANGE UP
-  CEFOXITIN: SIGNIFICANT CHANGE UP
-  CEFOXITIN: SIGNIFICANT CHANGE UP
-  CEFTAZIDIME: SIGNIFICANT CHANGE UP
-  CEFTAZIDIME: SIGNIFICANT CHANGE UP
-  CEFTRIAXONE: SIGNIFICANT CHANGE UP
-  CEFTRIAXONE: SIGNIFICANT CHANGE UP
-  CIPROFLOXACIN: SIGNIFICANT CHANGE UP
-  ERTAPENEM: SIGNIFICANT CHANGE UP
-  ERTAPENEM: SIGNIFICANT CHANGE UP
-  GENTAMICIN: SIGNIFICANT CHANGE UP
-  GENTAMICIN: SIGNIFICANT CHANGE UP
-  IMIPENEM: SIGNIFICANT CHANGE UP
-  LEVOFLOXACIN: SIGNIFICANT CHANGE UP
-  LEVOFLOXACIN: SIGNIFICANT CHANGE UP
-  MEROPENEM: SIGNIFICANT CHANGE UP
-  MEROPENEM: SIGNIFICANT CHANGE UP
-  PIPERACILLIN/TAZOBACTAM: SIGNIFICANT CHANGE UP
-  PIPERACILLIN/TAZOBACTAM: SIGNIFICANT CHANGE UP
-  TETRACYCLINE: SIGNIFICANT CHANGE UP
-  TIGECYCLINE: SIGNIFICANT CHANGE UP
-  TOBRAMYCIN: SIGNIFICANT CHANGE UP
-  TOBRAMYCIN: SIGNIFICANT CHANGE UP
-  TRIMETHOPRIM/SULFAMETHOXAZOLE: SIGNIFICANT CHANGE UP
-  TRIMETHOPRIM/SULFAMETHOXAZOLE: SIGNIFICANT CHANGE UP
-  VANCOMYCIN: SIGNIFICANT CHANGE UP
BACTERIA WND CULT: SIGNIFICANT CHANGE UP
ORGANISM # SPEC MICROSCOPIC CNT: SIGNIFICANT CHANGE UP

## 2019-10-30 PROCEDURE — 99024 POSTOP FOLLOW-UP VISIT: CPT

## 2019-10-30 NOTE — PHYSICAL EXAM
[de-identified] : Well ,comfortable. [de-identified] : Morbidly obese, midline wound with a center portion opening with some purulence. No extending erythema

## 2019-10-30 NOTE — PLAN
[FreeTextEntry1] : Wound infection with recent CT scan showing only superficial involvement.\par F/u w/ me in 2 weeks to assess healing, con't VNS for packing changes \par F/u w/ Dr. Gooden regarding pathology \par \par I spent 15min reviewing data, images and information. Greater than 50% of my time was spent in face to face discussion regarding wound healing, postoperative diet and activity.\par \par Nikko Velasquez MD\par Acute Care Surgery\par

## 2019-10-30 NOTE — HISTORY OF PRESENT ILLNESS
[de-identified] : Pt presenting for fullow up of wound infection. Draining well with some purulence through umbilical portion of skin closure. Packed with 1 inch iodoform packing. Pt doing other wise well. She has not followed up with Dr. Rivero yet, however.

## 2019-10-31 LAB
BACTERIA BLD CULT: SIGNIFICANT CHANGE UP
BACTERIA BLD CULT: SIGNIFICANT CHANGE UP

## 2019-11-01 ENCOUNTER — OUTPATIENT (OUTPATIENT)
Dept: OUTPATIENT SERVICES | Facility: HOSPITAL | Age: 69
LOS: 1 days | End: 2019-11-01
Payer: MEDICARE

## 2019-11-01 PROCEDURE — G9001: CPT

## 2019-11-11 DIAGNOSIS — Z71.89 OTHER SPECIFIED COUNSELING: ICD-10-CM

## 2019-11-13 ENCOUNTER — APPOINTMENT (OUTPATIENT)
Dept: SURGERY | Facility: CLINIC | Age: 69
End: 2019-11-13

## 2019-11-20 ENCOUNTER — APPOINTMENT (OUTPATIENT)
Dept: SURGERY | Facility: CLINIC | Age: 69
End: 2019-11-20
Payer: MEDICARE

## 2019-11-20 VITALS
HEART RATE: 97 BPM | BODY MASS INDEX: 45.83 KG/M2 | DIASTOLIC BLOOD PRESSURE: 100 MMHG | HEIGHT: 67 IN | SYSTOLIC BLOOD PRESSURE: 140 MMHG | WEIGHT: 292 LBS | TEMPERATURE: 98 F

## 2019-11-20 PROCEDURE — 99024 POSTOP FOLLOW-UP VISIT: CPT

## 2019-11-25 NOTE — PLAN
[FreeTextEntry1] : Wound infection with recent CT scan showing only superficial involvement.\par F/u w/ me in 2 weeks to assess healing, con't VNS for packing changes \par F/u w/ Dr. Gooden regarding pathology \par \par I spent 15min reviewing data, images and information. Greater than 50% of my time was spent in face to face discussion regarding wound healing.\par \par Nikko Velasquez MD\par Acute Care Surgery

## 2019-11-25 NOTE — HISTORY OF PRESENT ILLNESS
[de-identified] : Mrs. Leblanc is following up regarding her wound infection. The area continues to drain pus. She is having VNS replace the packing daily. She is afebrile, otherwise doing well. PO intake and GI function are normal.

## 2019-12-04 ENCOUNTER — APPOINTMENT (OUTPATIENT)
Dept: SURGERY | Facility: CLINIC | Age: 69
End: 2019-12-04
Payer: MEDICARE

## 2019-12-04 VITALS
WEIGHT: 292 LBS | SYSTOLIC BLOOD PRESSURE: 175 MMHG | DIASTOLIC BLOOD PRESSURE: 82 MMHG | HEIGHT: 67 IN | TEMPERATURE: 97.4 F | HEART RATE: 97 BPM | BODY MASS INDEX: 45.83 KG/M2

## 2019-12-04 PROCEDURE — 99024 POSTOP FOLLOW-UP VISIT: CPT

## 2019-12-04 NOTE — PLAN
[FreeTextEntry1] : Mrs. Leblanc's wounds have healed significantly since her last visit. She likely has an ongoing granuloma which is causing her continued wound drainage. \par -continue local care, allow rehabilitation\par -follow up in 3 weeks. If continued drainage noted - will discuss reopening wound\par -follow up with Dr. Rivero - she is afraid of high buildings and does not want to go to his 10th floor office. Urged her to at elast call his office for his follow up. \par \par I spent 15min reviewing data, images and information. Greater than 50% of my time was spent in face to face discussion regarding wound healing, postoperative diet and activity.\par \par Nikko Velasquez MD\par Acute Care Surgery\par

## 2020-01-15 ENCOUNTER — APPOINTMENT (OUTPATIENT)
Dept: SURGERY | Facility: CLINIC | Age: 70
End: 2020-01-15

## 2020-01-20 NOTE — PATIENT PROFILE ADULT - LIVING ENVIRONMENT
Pt's dad dropped off the al forms for himself and pt's teacher.  Mom already sent her forms to us.  Pt had a neuropschy evaluation back 6-12-17 at [facility] and the report is in [facility].  Dad is not sure what step would be next.  Please call pt's dad to advise.   no

## 2020-06-26 NOTE — PATIENT PROFILE ADULT - MEDICATIONS/VISITS
[facility] ED  [address]  Phone: [phone]               June 26, 2020    Patient: Maksim Nelosn   YOB: 1983   Date of Visit: 6/26/2020       To Whom It May Concern:    Maksim Nelson was seen and treated in our emergency department on 6/26/2020. She may return to work on 06/27/2020.       Sincerely,       Ashley Wu MD         Signature:__________________________________ no

## 2020-11-17 NOTE — PATIENT PROFILE ADULT - OVER THE PAST TWO WEEKS, HAVE YOU FELT LITTLE INTEREST OR PLEASURE IN DOING THINGS?
COVID-19 PCR test completed. Patient handout For Patients Who Have Been Tested for Covid-19 (Coronavirus) was given to the patient, which includes test result notification process.    
no

## 2022-01-01 NOTE — ED PROVIDER NOTE - PHYSICAL EXAMINATION
[place] admission General: well appearing, interactive, well nourished, NAD  HEENT: pupils equal and reactive, normal oropharynx, normal external ears bilaterally   Cardiac: RRR, no MRG apprieciated  Resp: lungs clear to auscultation bilaterally, symmetric chest wall rise  Abd: soft, ttp epigastric tenderness, nondistended, hyperactive bowel sounds  : no CVA tenderness  Neuro: Moving all extremities  Skin:  normal color for race

## 2023-01-20 NOTE — PROGRESS NOTE ADULT - SUBJECTIVE AND OBJECTIVE BOX
Subjective: Patient seen and examined. No new events except as noted.     SUBJECTIVE/ROS:  No chest pain, dyspnea, palpitation, or dizziness.       MEDICATIONS:  MEDICATIONS  (STANDING):  benzocaine 15 mG/menthol 3.6 mG Lozenge 1 Lozenge Oral three times a day  chlorhexidine 2% Cloths 1 Application(s) Topical daily  dextrose 5% + sodium chloride 0.45% with potassium chloride 20 mEq/L 1000 milliLiter(s) (75 mL/Hr) IV Continuous <Continuous>  dextrose 50% Injectable 12.5 Gram(s) IV Push once  dextrose 50% Injectable 25 Gram(s) IV Push once  dextrose 50% Injectable 25 Gram(s) IV Push once  enoxaparin Injectable 30 milliGRAM(s) SubCutaneous two times a day  insulin lispro (HumaLOG) corrective regimen sliding scale   SubCutaneous every 6 hours  magnesium sulfate  IVPB 2 Gram(s) IV Intermittent once  nystatin Powder 1 Application(s) Topical every 12 hours  pantoprazole  Injectable 40 milliGRAM(s) IV Push daily      PHYSICAL EXAM:  T(C): 36.5 (09-06-19 @ 06:13), Max: 36.7 (09-05-19 @ 22:03)  HR: 74 (09-06-19 @ 06:13) (74 - 86)  BP: 153/75 (09-06-19 @ 06:13) (144/57 - 157/63)  RR: 18 (09-06-19 @ 06:13) (18 - 20)  SpO2: 98% (09-06-19 @ 06:13) (91% - 98%)  Wt(kg): --  I&O's Summary    05 Sep 2019 07:01  -  06 Sep 2019 07:00  --------------------------------------------------------  IN: 300 mL / OUT: 2000 mL / NET: -1700 mL            JVP: Normal  Neck: supple  Lung: clear   CV: S1 S2 , Murmur:  Abd: soft  Ext: No edema  neuro: Awake / alert  Psych: flat affect  Skin: normal``    LABS/DATA:    CARDIAC MARKERS:                                8.8    10.08 )-----------( 285      ( 05 Sep 2019 06:00 )             30.3     09-06    140  |  103  |  5<L>  ----------------------------<  133<H>  4.0   |  28  |  0.50    Ca    8.8      06 Sep 2019 06:47  Phos  2.9     09-06  Mg     1.9     09-06    TPro  6.4  /  Alb  3.0<L>  /  TBili  0.3  /  DBili  x   /  AST  10  /  ALT  8   /  AlkPhos  103  09-06    proBNP:   Lipid Profile:   HgA1c:   TSH:     TELE:  EKG: 2 = A lot of assistance

## 2023-04-25 ENCOUNTER — EMERGENCY (EMERGENCY)
Facility: HOSPITAL | Age: 73
LOS: 1 days | Discharge: HOME | End: 2023-04-25
Attending: EMERGENCY MEDICINE | Admitting: EMERGENCY MEDICINE
Payer: MEDICARE

## 2023-04-25 VITALS
TEMPERATURE: 98 F | OXYGEN SATURATION: 97 % | SYSTOLIC BLOOD PRESSURE: 146 MMHG | RESPIRATION RATE: 16 BRPM | HEART RATE: 85 BPM | DIASTOLIC BLOOD PRESSURE: 51 MMHG

## 2023-04-25 VITALS
HEART RATE: 81 BPM | DIASTOLIC BLOOD PRESSURE: 62 MMHG | TEMPERATURE: 98 F | OXYGEN SATURATION: 100 % | SYSTOLIC BLOOD PRESSURE: 154 MMHG | RESPIRATION RATE: 18 BRPM

## 2023-04-25 LAB
ALBUMIN SERPL ELPH-MCNC: 3.5 G/DL — SIGNIFICANT CHANGE UP (ref 3.3–5)
ALP SERPL-CCNC: 132 U/L — HIGH (ref 40–120)
ALT FLD-CCNC: SIGNIFICANT CHANGE UP U/L (ref 4–33)
ANION GAP SERPL CALC-SCNC: 11 MMOL/L — SIGNIFICANT CHANGE UP (ref 7–14)
ANION GAP SERPL CALC-SCNC: 11 MMOL/L — SIGNIFICANT CHANGE UP (ref 7–14)
APPEARANCE UR: CLEAR — SIGNIFICANT CHANGE UP
APTT BLD: 30.2 SEC — SIGNIFICANT CHANGE UP (ref 27–36.3)
AST SERPL-CCNC: SIGNIFICANT CHANGE UP U/L (ref 4–32)
BACTERIA # UR AUTO: ABNORMAL
BASOPHILS # BLD AUTO: 0.03 K/UL — SIGNIFICANT CHANGE UP (ref 0–0.2)
BASOPHILS NFR BLD AUTO: 0.5 % — SIGNIFICANT CHANGE UP (ref 0–2)
BILIRUB SERPL-MCNC: 0.3 MG/DL — SIGNIFICANT CHANGE UP (ref 0.2–1.2)
BILIRUB UR-MCNC: NEGATIVE — SIGNIFICANT CHANGE UP
BLD GP AB SCN SERPL QL: NEGATIVE — SIGNIFICANT CHANGE UP
BLOOD GAS VENOUS COMPREHENSIVE RESULT: SIGNIFICANT CHANGE UP
BUN SERPL-MCNC: 16 MG/DL — SIGNIFICANT CHANGE UP (ref 7–23)
BUN SERPL-MCNC: 16 MG/DL — SIGNIFICANT CHANGE UP (ref 7–23)
CALCIUM SERPL-MCNC: 9.6 MG/DL — SIGNIFICANT CHANGE UP (ref 8.4–10.5)
CALCIUM SERPL-MCNC: 9.7 MG/DL — SIGNIFICANT CHANGE UP (ref 8.4–10.5)
CHLORIDE SERPL-SCNC: 96 MMOL/L — LOW (ref 98–107)
CHLORIDE SERPL-SCNC: 99 MMOL/L — SIGNIFICANT CHANGE UP (ref 98–107)
CO2 SERPL-SCNC: 24 MMOL/L — SIGNIFICANT CHANGE UP (ref 22–31)
CO2 SERPL-SCNC: 26 MMOL/L — SIGNIFICANT CHANGE UP (ref 22–31)
COLOR SPEC: SIGNIFICANT CHANGE UP
CREAT SERPL-MCNC: 0.49 MG/DL — LOW (ref 0.5–1.3)
CREAT SERPL-MCNC: 0.61 MG/DL — SIGNIFICANT CHANGE UP (ref 0.5–1.3)
DIFF PNL FLD: ABNORMAL
EGFR: 94 ML/MIN/1.73M2 — SIGNIFICANT CHANGE UP
EGFR: 99 ML/MIN/1.73M2 — SIGNIFICANT CHANGE UP
EOSINOPHIL # BLD AUTO: 0.11 K/UL — SIGNIFICANT CHANGE UP (ref 0–0.5)
EOSINOPHIL NFR BLD AUTO: 1.9 % — SIGNIFICANT CHANGE UP (ref 0–6)
EPI CELLS # UR: 4 /HPF — SIGNIFICANT CHANGE UP (ref 0–5)
GLUCOSE SERPL-MCNC: 155 MG/DL — HIGH (ref 70–99)
GLUCOSE SERPL-MCNC: 193 MG/DL — HIGH (ref 70–99)
GLUCOSE UR QL: NEGATIVE — SIGNIFICANT CHANGE UP
HCT VFR BLD CALC: 34.2 % — LOW (ref 34.5–45)
HGB BLD-MCNC: 10.3 G/DL — LOW (ref 11.5–15.5)
HYALINE CASTS # UR AUTO: 0 /LPF — SIGNIFICANT CHANGE UP (ref 0–7)
IANC: 3.09 K/UL — SIGNIFICANT CHANGE UP (ref 1.8–7.4)
IMM GRANULOCYTES NFR BLD AUTO: 0.2 % — SIGNIFICANT CHANGE UP (ref 0–0.9)
INR BLD: 1.09 RATIO — SIGNIFICANT CHANGE UP (ref 0.88–1.16)
KETONES UR-MCNC: NEGATIVE — SIGNIFICANT CHANGE UP
LEUKOCYTE ESTERASE UR-ACNC: ABNORMAL
LIDOCAIN IGE QN: 32 U/L — SIGNIFICANT CHANGE UP (ref 7–60)
LYMPHOCYTES # BLD AUTO: 2 K/UL — SIGNIFICANT CHANGE UP (ref 1–3.3)
LYMPHOCYTES # BLD AUTO: 34.5 % — SIGNIFICANT CHANGE UP (ref 13–44)
MCHC RBC-ENTMCNC: 23.7 PG — LOW (ref 27–34)
MCHC RBC-ENTMCNC: 30.1 GM/DL — LOW (ref 32–36)
MCV RBC AUTO: 78.6 FL — LOW (ref 80–100)
MONOCYTES # BLD AUTO: 0.56 K/UL — SIGNIFICANT CHANGE UP (ref 0–0.9)
MONOCYTES NFR BLD AUTO: 9.7 % — SIGNIFICANT CHANGE UP (ref 2–14)
NEUTROPHILS # BLD AUTO: 3.09 K/UL — SIGNIFICANT CHANGE UP (ref 1.8–7.4)
NEUTROPHILS NFR BLD AUTO: 53.2 % — SIGNIFICANT CHANGE UP (ref 43–77)
NITRITE UR-MCNC: NEGATIVE — SIGNIFICANT CHANGE UP
NRBC # BLD: 0 /100 WBCS — SIGNIFICANT CHANGE UP (ref 0–0)
NRBC # FLD: 0 K/UL — SIGNIFICANT CHANGE UP (ref 0–0)
PH UR: 6.5 — SIGNIFICANT CHANGE UP (ref 5–8)
PLATELET # BLD AUTO: 258 K/UL — SIGNIFICANT CHANGE UP (ref 150–400)
POTASSIUM SERPL-MCNC: 3.4 MMOL/L — LOW (ref 3.5–5.3)
POTASSIUM SERPL-MCNC: SIGNIFICANT CHANGE UP MMOL/L (ref 3.5–5.3)
POTASSIUM SERPL-SCNC: 3.4 MMOL/L — LOW (ref 3.5–5.3)
POTASSIUM SERPL-SCNC: SIGNIFICANT CHANGE UP MMOL/L (ref 3.5–5.3)
PROT SERPL-MCNC: SIGNIFICANT CHANGE UP G/DL (ref 6–8.3)
PROT UR-MCNC: ABNORMAL
PROTHROM AB SERPL-ACNC: 12.7 SEC — SIGNIFICANT CHANGE UP (ref 10.5–13.4)
RBC # BLD: 4.35 M/UL — SIGNIFICANT CHANGE UP (ref 3.8–5.2)
RBC # FLD: 15.1 % — HIGH (ref 10.3–14.5)
RBC CASTS # UR COMP ASSIST: 103 /HPF — HIGH (ref 0–4)
RH IG SCN BLD-IMP: NEGATIVE — SIGNIFICANT CHANGE UP
SODIUM SERPL-SCNC: 131 MMOL/L — LOW (ref 135–145)
SODIUM SERPL-SCNC: 136 MMOL/L — SIGNIFICANT CHANGE UP (ref 135–145)
SP GR SPEC: 1.02 — SIGNIFICANT CHANGE UP (ref 1.01–1.05)
UROBILINOGEN FLD QL: SIGNIFICANT CHANGE UP
WBC # BLD: 5.8 K/UL — SIGNIFICANT CHANGE UP (ref 3.8–10.5)
WBC # FLD AUTO: 5.8 K/UL — SIGNIFICANT CHANGE UP (ref 3.8–10.5)
WBC UR QL: 4 /HPF — SIGNIFICANT CHANGE UP (ref 0–5)

## 2023-04-25 PROCEDURE — 74177 CT ABD & PELVIS W/CONTRAST: CPT | Mod: 26,MA

## 2023-04-25 PROCEDURE — 99284 EMERGENCY DEPT VISIT MOD MDM: CPT

## 2023-04-25 PROCEDURE — 71045 X-RAY EXAM CHEST 1 VIEW: CPT | Mod: 26

## 2023-04-25 RX ORDER — SODIUM CHLORIDE 9 MG/ML
1000 INJECTION INTRAMUSCULAR; INTRAVENOUS; SUBCUTANEOUS ONCE
Refills: 0 | Status: COMPLETED | OUTPATIENT
Start: 2023-04-25 | End: 2023-04-25

## 2023-04-25 RX ORDER — POTASSIUM CHLORIDE 20 MEQ
40 PACKET (EA) ORAL ONCE
Refills: 0 | Status: COMPLETED | OUTPATIENT
Start: 2023-04-25 | End: 2023-04-25

## 2023-04-25 RX ORDER — ACETAMINOPHEN 500 MG
1000 TABLET ORAL ONCE
Refills: 0 | Status: COMPLETED | OUTPATIENT
Start: 2023-04-25 | End: 2023-04-25

## 2023-04-25 RX ADMIN — Medication 400 MILLIGRAM(S): at 15:52

## 2023-04-25 RX ADMIN — SODIUM CHLORIDE 1000 MILLILITER(S): 9 INJECTION INTRAMUSCULAR; INTRAVENOUS; SUBCUTANEOUS at 15:52

## 2023-04-25 RX ADMIN — Medication 40 MILLIEQUIVALENT(S): at 20:21

## 2023-04-25 NOTE — ED PROVIDER NOTE - PATIENT PORTAL LINK FT
You can access the FollowMyHealth Patient Portal offered by [facility] by registering at the following website: [email or web address]. By joining [employer]’s FollowMyHealth portal, you will also be able to view your health information using other applications (apps) compatible with our system.

## 2023-04-25 NOTE — ED PROVIDER NOTE - CARE PLAN
Principal Discharge DX:	Hematuria   1 Principal Discharge DX:	Bladder mass  Secondary Diagnosis:	Hematuria  Secondary Diagnosis:	Hypokalemia  Secondary Diagnosis:	Ventral hernia

## 2023-04-25 NOTE — ED PROVIDER NOTE - CLINICAL SUMMARY MEDICAL DECISION MAKING FREE TEXT BOX
MDM & Summary:  72-year-old female history of SBO CAD history of hernia and SBO surgery presenting with hematuria and lower abdominal pain.  Last bowel movement 2 days ago.  Last passing gas yesterday.  Vital signs not remarkable, afebrile  DDx:  in the setting of painless hematuria, could be nephrolithiasis, could be cancer given her history of smoking and postmenopausal bleeding at this time.  Could be hemorrhoids, could be fistula, although nothing felt on internal exam or seen on external exam.  no blood on TATY to suggest internal hemorrhoid.  No blood pooling, closed cervix.  No blood whatsoever in the vaginal canal.  No discharge noted.  Less likely STI such as chlamydia or gonorrhea or HIV.  Labs:   CBC CMP VBG type and screen PT PTT  UA UC  Imaging:  CAT scan abdomen pelvis with contrast   Tx:  symptomatic at this time.  Fluids to help urination  Consults/Resources:  possibly OB/GYN, to be determined   dispo is to be determined    Triage note reviewed. VS reviewed. EKG reviewed and documented in "RESULTS" section, if possible at given time.     DDx in MDM includes the most likely ddx, but is not limited to solely what is listed. Progress notes written as needed, and are included in "PROGRESS NOTE" section below.       Medical, family, and social determinants of health reviewed and discussed w/ pt/family/caretaker, when allowable, and is incorporated into note above, whenever possible.

## 2023-04-25 NOTE — ED PROVIDER NOTE - ATTENDING CONTRIBUTION TO CARE
Attending note:   After face to face evaluation of this patient, I concur with above noted hx, pe, and care plan for this patient.  Pham: 72-year-old female past medical history of diabetes, hypertension and coronary artery disease.  Patient also has history of hernia and SBO.  Patient presents ED with 3 days of lower abdominal pain and hematuria.  Patient has known history of frequent UTIs and has not had similar episodes in the past.  Patient notes that there is blood when she wipes but does not appear to be coming from her vagina.  Patient is postmenopausal and has not sought care for this prior.  Patient has been having some constipation.  There is no weight loss or fevers or chills.  On exam patient is obese and is now on ambulatory.  There is some tenderness around the umbilicus but there is no significant distention, masses, erythema.  Pelvic exam with speculum shows no blood in the vaginal vault.  Rectal exam shows no blood as well.  Patient did show photo confirming hematuria in the toilet bowl.  MDM: 72-year-old female with hematuria.  This could be due to relatively benign source such as UTI versus more concerning source such as a tumor given patient does have a smoking history.  We will check labs, urine, urine culture and CT as patient is probably unable to get these tests as an outpatient.

## 2023-04-25 NOTE — ED PROVIDER NOTE - NSFOLLOWUPINSTRUCTIONS_ED_ALL_ED_FT
-     - Your testing/exams was/were reassuring that dangerous emergencies/conditions are less likely to be occurring or to have occurred.    - Take all medications as directed.    - For pain or fever you can take ibuprofen (motrin, advil) or acetaminophen (tylenol) as needed, as directed on packaging.  - Follow up with your primary doctor within 5 days as directed.  - If you had labs or imaging done, you were given copies of all labs and/or imaging results from your er visit--please take them with you to your follow up appointments.  - If needed, call patient access services at [phone] to find a primary care physician (PCP). Call this number to follow up with a specialty service, such as the spine clinic. If you need this, call and say you were recently in the emergency department and you are calling, per my orders.   - Make sure you do not require a primary care physician's referral if you make a specialty clinic appointment directly. Some insurance requires you to see your PCP, get a referral, then make a specialty appointment. Your Ct shows a mass of the bladder which is likely why you have bleeding. Follow up with a Urologist on the sheet given to you and also the ED discharge lounge will contact you for follow up. Your potassium was slightly low and it was given to you orally in the ER. Follow up with your primary doctor to re-check this level. Advance activity as tolerated.  Continue all previously prescribed medications as directed.  Follow up with your primary care physician in 48-72 hours- bring copies of your results.  Return to the ER for worsening or persistent symptoms, and/or ANY NEW OR CONCERNING SYMPTOMS. THIS INCLUDES BUT IS NOT LIMITED TO LIGHTHEADEDNESS, INABILITY TO URINATE, PAIN WITH URINATION, INCREASING BLEEDING OR FOR ANY OTHER SYMPTOMS THAT CONCERN YOU. If you have issues obtaining follow up, please call: [phone] to obtain a doctor or specialist who takes your insurance in your area.  You may call [phone] to make an appointment with the internal medicine clinic.

## 2023-04-25 NOTE — ED ADULT NURSE NOTE - OBJECTIVE STATEMENT
Patient received in stretcher. AOX4. Respirations even and unlabored. Spontaneous movement of all extremities noted. Patient reports she is mostly bed and chair bound. Presents to ER c/o blood in urine. Patient reports bright red blood when she urinates associated with back pain. Patient reports back pain is chronic but feels different from her usual back pain. IV placed. Labs drawn. Medicated as per MD orders, Comfort and safety maintained. All current care needs met. Care plan continued Darek MADRID

## 2023-04-25 NOTE — ED PROVIDER NOTE - PROGRESS NOTE DETAILS
MAGY Nava: Received signout on the pt. Discussed with the pt and her son who is bedside regarding her CT findings of a bladder mass. Discussed with the patient that a cystoscopy is needed, discussed what a cystoscopy is in laymen's terms and advised the patient that a cancer of the bladder must be ruled. out. Pt was given a print out of the Urology list and was given discharge lounge follow up for Urology. Pt already aware of her ventral hernia, denies acute sx. MAGY Nava: Received signout on the pt. Discussed with the pt and her son who is bedside regarding her CT findings of a bladder mass. Discussed with the patient that a cystoscopy is needed, discussed what a cystoscopy is in laymen's terms and advised the patient that a cancer of the bladder must be ruled. out. Pt was given a print out of the Urology list and was given discharge lounge follow up for Urology. Pt already aware of her ventral hernia, denies acute sx. Spoke to  to arrange discharge transportation. MAGY Nava: Received signout on the pt. Discussed with the pt and her son who is bedside regarding her CT findings of a bladder mass. Discussed with the patient that a cystoscopy is needed, discussed what a cystoscopy is in laymen's terms and advised the patient that a cancer of the bladder must be ruled. out. Pt was given a print out of the Urology list and was given discharge lounge follow up for Urology. Pt already aware of her ventral hernia, denies acute sx. Spoke to  to arrange discharge transportation. Discussed with Urology regarding the case who states the cystoscopy would occur in an outpatient/follow up setting.

## 2023-04-25 NOTE — ED PROVIDER NOTE - OBJECTIVE STATEMENT
HPI & ROS: 72-year-old female history of diabetes hypertension SBO CAD history of hernia and SBO surgery unclear if with resection presenting with 3 days hematuria and lower abdominal pain.  This is never happened before.  Was on toilet 3 days ago and then started experiencing some blood in the toilet.  Blood when she wipes.  No history of hemorrhoids or fistulas.  Does have a history of constipation.   Patient postmenopausal.  Patient without fevers chills nausea vomiting.  Patient without diarrhea.  Hematuria is painless.  Has not taken any medication for this.  Patient without pruritus.  Patient without recent weight loss or night sweats.  Patient without headaches.  No chest pain no shortness of breath does feel her belly is more distended than usual.  No calf swelling or calf tenderness.  Of note, patient gets her care from home housecalls from [facility] and finds it very difficult to go and see OB/GYN.  Last time saw OB/GYN was approximately 4 years ago.    PCP: [facility]     Exam as stated below:   CONSTITUTIONAL: In NAD.   SKIN: Warm dry. No rashes noted.   EYES: No scleral icterus. Conjunctiva pink  NECK: No ttp.    CARD: RRR. No murmurs.  RESP: Clear to ausculation b/l. No Crackles noted. No Wheezing noted.  ABD: Soft. Non-tender.   Distended but could be body habitus.   External vaginal exam and rectal exam without hemorrhoids or fistulas, no blood noted. Bimanual exam negative for adnexal tenderness bilaterally.  No blood noted in vaginal canal.   Digital rectal exam without occult blood or palpation of hemorrhoid internally   MSK: No pedal edema. No calf tenderness.  NEURO: UE/LE grossly intact. Motor UE/LE sensation grossly intact. CN II-XII grossly intact.   PSYCH: Cooperative, appropriate.

## 2023-04-27 LAB
CULTURE RESULTS: SIGNIFICANT CHANGE UP
SPECIMEN SOURCE: SIGNIFICANT CHANGE UP

## 2023-05-09 ENCOUNTER — APPOINTMENT (OUTPATIENT)
Dept: UROLOGY | Facility: CLINIC | Age: 73
End: 2023-05-09

## 2023-12-27 NOTE — PATIENT PROFILE ADULT - NSPROHMSYMPCOND_GEN_A_NUR
"Left message voicemail for patient to please return call.  Ofc. # given.     HUB: Please relay that Dr Camacho said to let her know her labs are normal except her cholesterol is still elevated  I would recommend that we start a statin medication.\" If she is agreeable we will send in rx for Rosuvastatin 10 mg once daily.     Document if notified.   " gastrointestinal/musculoskeletal

## 2024-03-16 NOTE — PHYSICAL THERAPY INITIAL EVALUATION ADULT - LIVES WITH, PROFILE
No assistance needed Lives in a house with  and nephew. Has 3 steps to enter, bedroom on 2nd floor. Pt. states she has been staying on 1st floor secondary to difficulty with stair negotiation.